# Patient Record
Sex: FEMALE | Race: WHITE | Employment: UNEMPLOYED | URBAN - METROPOLITAN AREA
[De-identification: names, ages, dates, MRNs, and addresses within clinical notes are randomized per-mention and may not be internally consistent; named-entity substitution may affect disease eponyms.]

---

## 2017-02-22 ENCOUNTER — HOSPITAL ENCOUNTER (OUTPATIENT)
Dept: PREADMISSION TESTING | Age: 47
Discharge: HOME | End: 2017-02-22
Payer: COMMERCIAL

## 2017-02-22 VITALS
TEMPERATURE: 97.6 F | BODY MASS INDEX: 41.29 KG/M2 | OXYGEN SATURATION: 98 % | DIASTOLIC BLOOD PRESSURE: 69 MMHG | HEART RATE: 53 BPM | HEIGHT: 65 IN | SYSTOLIC BLOOD PRESSURE: 111 MMHG | RESPIRATION RATE: 16 BRPM | WEIGHT: 247.8 LBS

## 2017-02-22 LAB
ANION GAP SERPL CALCULATED.3IONS-SCNC: 11 MEQ/L (ref 7–13)
BUN BLDV-MCNC: 11 MG/DL (ref 6–20)
CALCIUM SERPL-MCNC: 9.5 MG/DL (ref 8.6–10.2)
CHLORIDE BLD-SCNC: 102 MEQ/L (ref 98–107)
CO2: 27 MEQ/L (ref 22–29)
CREAT SERPL-MCNC: 0.62 MG/DL (ref 0.5–0.9)
GFR AFRICAN AMERICAN: >60
GFR NON-AFRICAN AMERICAN: >60
GLUCOSE BLD-MCNC: 90 MG/DL (ref 74–109)
HCT VFR BLD CALC: 41.8 % (ref 37–47)
HEMOGLOBIN: 14.4 G/DL (ref 12–16)
MCH RBC QN AUTO: 29.7 PG (ref 27–31.3)
MCHC RBC AUTO-ENTMCNC: 34.4 % (ref 33–37)
MCV RBC AUTO: 86.3 FL (ref 82–100)
PDW BLD-RTO: 14 % (ref 11.5–14.5)
PLATELET # BLD: 282 K/UL (ref 130–400)
POTASSIUM SERPL-SCNC: 4.5 MEQ/L (ref 3.5–5.1)
RBC # BLD: 4.84 M/UL (ref 4.2–5.4)
SODIUM BLD-SCNC: 140 MEQ/L (ref 132–144)
TSH SERPL DL<=0.05 MIU/L-ACNC: 0.07 UIU/ML (ref 0.27–4.2)
WBC # BLD: 7.8 K/UL (ref 4.8–10.8)

## 2017-02-22 PROCEDURE — 85027 COMPLETE CBC AUTOMATED: CPT

## 2017-02-22 PROCEDURE — 80048 BASIC METABOLIC PNL TOTAL CA: CPT

## 2017-02-22 PROCEDURE — 84443 ASSAY THYROID STIM HORMONE: CPT

## 2017-02-22 RX ORDER — SODIUM CHLORIDE 0.9 % (FLUSH) 0.9 %
10 SYRINGE (ML) INJECTION PRN
Status: CANCELLED | OUTPATIENT
Start: 2017-02-22

## 2017-02-22 RX ORDER — SODIUM CHLORIDE, SODIUM LACTATE, POTASSIUM CHLORIDE, CALCIUM CHLORIDE 600; 310; 30; 20 MG/100ML; MG/100ML; MG/100ML; MG/100ML
INJECTION, SOLUTION INTRAVENOUS CONTINUOUS
Status: CANCELLED | OUTPATIENT
Start: 2017-02-22

## 2017-02-22 RX ORDER — LIDOCAINE HYDROCHLORIDE 10 MG/ML
1 INJECTION, SOLUTION EPIDURAL; INFILTRATION; INTRACAUDAL; PERINEURAL
Status: CANCELLED | OUTPATIENT
Start: 2017-02-22 | End: 2017-02-22

## 2017-02-22 RX ORDER — FLUTICASONE FUROATE AND VILANTEROL 100; 25 UG/1; UG/1
1 POWDER RESPIRATORY (INHALATION) DAILY
COMMUNITY
End: 2021-12-31

## 2017-02-22 RX ORDER — SODIUM CHLORIDE 0.9 % (FLUSH) 0.9 %
10 SYRINGE (ML) INJECTION EVERY 12 HOURS SCHEDULED
Status: CANCELLED | OUTPATIENT
Start: 2017-02-22

## 2017-02-22 ASSESSMENT — ENCOUNTER SYMPTOMS
DOUBLE VISION: 0
WHEEZING: 0
NAUSEA: 0
DIARRHEA: 0
SPUTUM PRODUCTION: 0
HEARTBURN: 1
SORE THROAT: 0
PHOTOPHOBIA: 0
CONSTIPATION: 0
EYE PAIN: 0
VOMITING: 0
BLURRED VISION: 0
BACK PAIN: 0
COUGH: 0
ABDOMINAL PAIN: 0
SHORTNESS OF BREATH: 0

## 2017-03-01 ENCOUNTER — ANESTHESIA EVENT (OUTPATIENT)
Dept: OPERATING ROOM | Age: 47
End: 2017-03-01
Payer: COMMERCIAL

## 2017-03-02 ENCOUNTER — PREP FOR PROCEDURE (OUTPATIENT)
Dept: ORTHOPEDIC SURGERY | Age: 47
End: 2017-03-02

## 2017-03-02 ENCOUNTER — ANESTHESIA (OUTPATIENT)
Dept: OPERATING ROOM | Age: 47
End: 2017-03-02
Payer: COMMERCIAL

## 2017-03-02 ENCOUNTER — HOSPITAL ENCOUNTER (OUTPATIENT)
Age: 47
Setting detail: OUTPATIENT SURGERY
Discharge: HOME | End: 2017-03-02
Attending: ORTHOPAEDIC SURGERY | Admitting: ORTHOPAEDIC SURGERY
Payer: COMMERCIAL

## 2017-03-02 VITALS
OXYGEN SATURATION: 94 % | RESPIRATION RATE: 16 BRPM | DIASTOLIC BLOOD PRESSURE: 59 MMHG | SYSTOLIC BLOOD PRESSURE: 104 MMHG | TEMPERATURE: 97.7 F | HEART RATE: 66 BPM

## 2017-03-02 VITALS — DIASTOLIC BLOOD PRESSURE: 77 MMHG | OXYGEN SATURATION: 97 % | SYSTOLIC BLOOD PRESSURE: 120 MMHG | TEMPERATURE: 97.7 F

## 2017-03-02 LAB
GLUCOSE BLD-MCNC: 113 MG/DL (ref 60–115)
GLUCOSE BLD-MCNC: 121 MG/DL (ref 60–115)
PERFORMED ON: ABNORMAL
PERFORMED ON: NORMAL

## 2017-03-02 PROCEDURE — 7100000011 HC PHASE II RECOVERY - ADDTL 15 MIN: Performed by: ORTHOPAEDIC SURGERY

## 2017-03-02 PROCEDURE — 2500000003 HC RX 250 WO HCPCS: Performed by: NURSE ANESTHETIST, CERTIFIED REGISTERED

## 2017-03-02 PROCEDURE — 2500000003 HC RX 250 WO HCPCS: Performed by: STUDENT IN AN ORGANIZED HEALTH CARE EDUCATION/TRAINING PROGRAM

## 2017-03-02 PROCEDURE — 6360000002 HC RX W HCPCS: Performed by: ANESTHESIOLOGY

## 2017-03-02 PROCEDURE — 2500000003 HC RX 250 WO HCPCS: Performed by: ORTHOPAEDIC SURGERY

## 2017-03-02 PROCEDURE — 7100000010 HC PHASE II RECOVERY - FIRST 15 MIN: Performed by: ORTHOPAEDIC SURGERY

## 2017-03-02 PROCEDURE — 7100000000 HC PACU RECOVERY - FIRST 15 MIN: Performed by: ORTHOPAEDIC SURGERY

## 2017-03-02 PROCEDURE — 3700000001 HC ADD 15 MINUTES (ANESTHESIA): Performed by: ORTHOPAEDIC SURGERY

## 2017-03-02 PROCEDURE — 2720000010 HC SURG SUPPLY STERILE: Performed by: ORTHOPAEDIC SURGERY

## 2017-03-02 PROCEDURE — 3600000004 HC SURGERY LEVEL 4 BASE: Performed by: ORTHOPAEDIC SURGERY

## 2017-03-02 PROCEDURE — 3600000014 HC SURGERY LEVEL 4 ADDTL 15MIN: Performed by: ORTHOPAEDIC SURGERY

## 2017-03-02 PROCEDURE — 3700000000 HC ANESTHESIA ATTENDED CARE: Performed by: ORTHOPAEDIC SURGERY

## 2017-03-02 PROCEDURE — 6360000002 HC RX W HCPCS: Performed by: ORTHOPAEDIC SURGERY

## 2017-03-02 PROCEDURE — 6370000000 HC RX 637 (ALT 250 FOR IP): Performed by: ORTHOPAEDIC SURGERY

## 2017-03-02 PROCEDURE — 6360000002 HC RX W HCPCS: Performed by: NURSE ANESTHETIST, CERTIFIED REGISTERED

## 2017-03-02 PROCEDURE — 2580000003 HC RX 258: Performed by: ORTHOPAEDIC SURGERY

## 2017-03-02 PROCEDURE — 7100000001 HC PACU RECOVERY - ADDTL 15 MIN: Performed by: ORTHOPAEDIC SURGERY

## 2017-03-02 PROCEDURE — 2580000003 HC RX 258: Performed by: STUDENT IN AN ORGANIZED HEALTH CARE EDUCATION/TRAINING PROGRAM

## 2017-03-02 RX ORDER — SODIUM CHLORIDE 0.9 % (FLUSH) 0.9 %
10 SYRINGE (ML) INJECTION EVERY 12 HOURS SCHEDULED
Status: DISCONTINUED | OUTPATIENT
Start: 2017-03-02 | End: 2017-03-02 | Stop reason: HOSPADM

## 2017-03-02 RX ORDER — MORPHINE SULFATE 2 MG/ML
2 INJECTION, SOLUTION INTRAMUSCULAR; INTRAVENOUS
Status: DISCONTINUED | OUTPATIENT
Start: 2017-03-02 | End: 2017-03-02 | Stop reason: HOSPADM

## 2017-03-02 RX ORDER — HYDROCODONE BITARTRATE AND ACETAMINOPHEN 5; 325 MG/1; MG/1
1 TABLET ORAL PRN
Status: DISCONTINUED | OUTPATIENT
Start: 2017-03-02 | End: 2017-03-02 | Stop reason: HOSPADM

## 2017-03-02 RX ORDER — OXYCODONE HYDROCHLORIDE AND ACETAMINOPHEN 5; 325 MG/1; MG/1
2 TABLET ORAL EVERY 4 HOURS PRN
Status: DISCONTINUED | OUTPATIENT
Start: 2017-03-02 | End: 2017-03-02 | Stop reason: HOSPADM

## 2017-03-02 RX ORDER — SODIUM CHLORIDE 0.9 % (FLUSH) 0.9 %
10 SYRINGE (ML) INJECTION PRN
Status: DISCONTINUED | OUTPATIENT
Start: 2017-03-02 | End: 2017-03-02 | Stop reason: HOSPADM

## 2017-03-02 RX ORDER — METOCLOPRAMIDE HYDROCHLORIDE 5 MG/ML
10 INJECTION INTRAMUSCULAR; INTRAVENOUS
Status: COMPLETED | OUTPATIENT
Start: 2017-03-02 | End: 2017-03-02

## 2017-03-02 RX ORDER — ACETAMINOPHEN 325 MG/1
650 TABLET ORAL EVERY 4 HOURS PRN
Status: DISCONTINUED | OUTPATIENT
Start: 2017-03-02 | End: 2017-03-02 | Stop reason: HOSPADM

## 2017-03-02 RX ORDER — OXYCODONE HYDROCHLORIDE AND ACETAMINOPHEN 5; 325 MG/1; MG/1
1 TABLET ORAL EVERY 4 HOURS PRN
Status: CANCELLED | OUTPATIENT
Start: 2017-03-02

## 2017-03-02 RX ORDER — MORPHINE SULFATE 2 MG/ML
4 INJECTION, SOLUTION INTRAMUSCULAR; INTRAVENOUS
Status: CANCELLED | OUTPATIENT
Start: 2017-03-02

## 2017-03-02 RX ORDER — GINSENG 100 MG
CAPSULE ORAL PRN
Status: DISCONTINUED | OUTPATIENT
Start: 2017-03-02 | End: 2017-03-02 | Stop reason: HOSPADM

## 2017-03-02 RX ORDER — ACETAMINOPHEN 325 MG/1
650 TABLET ORAL EVERY 4 HOURS PRN
Status: CANCELLED | OUTPATIENT
Start: 2017-03-02

## 2017-03-02 RX ORDER — MORPHINE SULFATE 4 MG/ML
4 INJECTION, SOLUTION INTRAMUSCULAR; INTRAVENOUS
Status: DISCONTINUED | OUTPATIENT
Start: 2017-03-02 | End: 2017-03-02 | Stop reason: HOSPADM

## 2017-03-02 RX ORDER — SODIUM CHLORIDE, SODIUM LACTATE, POTASSIUM CHLORIDE, CALCIUM CHLORIDE 600; 310; 30; 20 MG/100ML; MG/100ML; MG/100ML; MG/100ML
INJECTION, SOLUTION INTRAVENOUS CONTINUOUS
Status: DISCONTINUED | OUTPATIENT
Start: 2017-03-02 | End: 2017-03-02 | Stop reason: HOSPADM

## 2017-03-02 RX ORDER — HYDROCODONE BITARTRATE AND ACETAMINOPHEN 5; 325 MG/1; MG/1
2 TABLET ORAL PRN
Status: DISCONTINUED | OUTPATIENT
Start: 2017-03-02 | End: 2017-03-02 | Stop reason: HOSPADM

## 2017-03-02 RX ORDER — MIDAZOLAM HYDROCHLORIDE 1 MG/ML
INJECTION INTRAMUSCULAR; INTRAVENOUS PRN
Status: DISCONTINUED | OUTPATIENT
Start: 2017-03-02 | End: 2017-03-02 | Stop reason: SDUPTHER

## 2017-03-02 RX ORDER — SODIUM CHLORIDE 0.9 % (FLUSH) 0.9 %
10 SYRINGE (ML) INJECTION PRN
Status: CANCELLED | OUTPATIENT
Start: 2017-03-02

## 2017-03-02 RX ORDER — LIDOCAINE HYDROCHLORIDE 10 MG/ML
1 INJECTION, SOLUTION EPIDURAL; INFILTRATION; INTRACAUDAL; PERINEURAL
Status: COMPLETED | OUTPATIENT
Start: 2017-03-02 | End: 2017-03-02

## 2017-03-02 RX ORDER — MAGNESIUM HYDROXIDE 1200 MG/15ML
LIQUID ORAL CONTINUOUS PRN
Status: DISCONTINUED | OUTPATIENT
Start: 2017-03-02 | End: 2017-03-02 | Stop reason: HOSPADM

## 2017-03-02 RX ORDER — PROPOFOL 10 MG/ML
INJECTION, EMULSION INTRAVENOUS PRN
Status: DISCONTINUED | OUTPATIENT
Start: 2017-03-02 | End: 2017-03-02 | Stop reason: SDUPTHER

## 2017-03-02 RX ORDER — MEPERIDINE HYDROCHLORIDE 25 MG/ML
12.5 INJECTION INTRAMUSCULAR; INTRAVENOUS; SUBCUTANEOUS EVERY 5 MIN PRN
Status: DISCONTINUED | OUTPATIENT
Start: 2017-03-02 | End: 2017-03-02 | Stop reason: HOSPADM

## 2017-03-02 RX ORDER — ONDANSETRON 2 MG/ML
4 INJECTION INTRAMUSCULAR; INTRAVENOUS
Status: COMPLETED | OUTPATIENT
Start: 2017-03-02 | End: 2017-03-02

## 2017-03-02 RX ORDER — OXYCODONE HYDROCHLORIDE AND ACETAMINOPHEN 5; 325 MG/1; MG/1
2 TABLET ORAL EVERY 4 HOURS PRN
Status: CANCELLED | OUTPATIENT
Start: 2017-03-02

## 2017-03-02 RX ORDER — ONDANSETRON 2 MG/ML
INJECTION INTRAMUSCULAR; INTRAVENOUS PRN
Status: DISCONTINUED | OUTPATIENT
Start: 2017-03-02 | End: 2017-03-02 | Stop reason: SDUPTHER

## 2017-03-02 RX ORDER — ONDANSETRON 2 MG/ML
4 INJECTION INTRAMUSCULAR; INTRAVENOUS EVERY 6 HOURS PRN
Status: DISCONTINUED | OUTPATIENT
Start: 2017-03-02 | End: 2017-03-02 | Stop reason: HOSPADM

## 2017-03-02 RX ORDER — KETOROLAC TROMETHAMINE 30 MG/ML
INJECTION, SOLUTION INTRAMUSCULAR; INTRAVENOUS PRN
Status: DISCONTINUED | OUTPATIENT
Start: 2017-03-02 | End: 2017-03-02 | Stop reason: SDUPTHER

## 2017-03-02 RX ORDER — MORPHINE SULFATE 2 MG/ML
2 INJECTION, SOLUTION INTRAMUSCULAR; INTRAVENOUS
Status: CANCELLED | OUTPATIENT
Start: 2017-03-02

## 2017-03-02 RX ORDER — LIDOCAINE HYDROCHLORIDE 10 MG/ML
INJECTION, SOLUTION EPIDURAL; INFILTRATION; INTRACAUDAL; PERINEURAL PRN
Status: DISCONTINUED | OUTPATIENT
Start: 2017-03-02 | End: 2017-03-02 | Stop reason: SDUPTHER

## 2017-03-02 RX ORDER — SODIUM CHLORIDE 0.9 % (FLUSH) 0.9 %
10 SYRINGE (ML) INJECTION EVERY 12 HOURS SCHEDULED
Status: CANCELLED | OUTPATIENT
Start: 2017-03-02

## 2017-03-02 RX ORDER — FENTANYL CITRATE 50 UG/ML
50 INJECTION, SOLUTION INTRAMUSCULAR; INTRAVENOUS EVERY 10 MIN PRN
Status: DISCONTINUED | OUTPATIENT
Start: 2017-03-02 | End: 2017-03-02 | Stop reason: HOSPADM

## 2017-03-02 RX ORDER — ONDANSETRON 2 MG/ML
4 INJECTION INTRAMUSCULAR; INTRAVENOUS EVERY 6 HOURS PRN
Status: CANCELLED | OUTPATIENT
Start: 2017-03-02

## 2017-03-02 RX ORDER — DIPHENHYDRAMINE HYDROCHLORIDE 50 MG/ML
12.5 INJECTION INTRAMUSCULAR; INTRAVENOUS
Status: DISCONTINUED | OUTPATIENT
Start: 2017-03-02 | End: 2017-03-02 | Stop reason: HOSPADM

## 2017-03-02 RX ORDER — OXYCODONE HYDROCHLORIDE AND ACETAMINOPHEN 5; 325 MG/1; MG/1
1 TABLET ORAL EVERY 4 HOURS PRN
Status: DISCONTINUED | OUTPATIENT
Start: 2017-03-02 | End: 2017-03-02 | Stop reason: HOSPADM

## 2017-03-02 RX ORDER — BUPIVACAINE HYDROCHLORIDE 5 MG/ML
INJECTION, SOLUTION EPIDURAL; INTRACAUDAL PRN
Status: DISCONTINUED | OUTPATIENT
Start: 2017-03-02 | End: 2017-03-02 | Stop reason: HOSPADM

## 2017-03-02 RX ADMIN — MIDAZOLAM HYDROCHLORIDE 2 MG: 1 INJECTION, SOLUTION INTRAMUSCULAR; INTRAVENOUS at 07:31

## 2017-03-02 RX ADMIN — FENTANYL CITRATE 50 MCG: 50 INJECTION, SOLUTION INTRAMUSCULAR; INTRAVENOUS at 07:31

## 2017-03-02 RX ADMIN — KETOROLAC TROMETHAMINE 30 MG: 30 INJECTION, SOLUTION INTRAMUSCULAR; INTRAVENOUS at 07:46

## 2017-03-02 RX ADMIN — PROPOFOL 150 MG: 10 INJECTION, EMULSION INTRAVENOUS at 07:31

## 2017-03-02 RX ADMIN — FENTANYL CITRATE 25 MCG: 50 INJECTION, SOLUTION INTRAMUSCULAR; INTRAVENOUS at 07:45

## 2017-03-02 RX ADMIN — HYDROMORPHONE HYDROCHLORIDE 0.5 MG: 1 INJECTION, SOLUTION INTRAMUSCULAR; INTRAVENOUS; SUBCUTANEOUS at 08:25

## 2017-03-02 RX ADMIN — ONDANSETRON 4 MG: 2 INJECTION, SOLUTION INTRAMUSCULAR; INTRAVENOUS at 07:35

## 2017-03-02 RX ADMIN — METOCLOPRAMIDE 10 MG: 5 INJECTION, SOLUTION INTRAMUSCULAR; INTRAVENOUS at 09:00

## 2017-03-02 RX ADMIN — HYDROMORPHONE HYDROCHLORIDE 0.5 MG: 1 INJECTION, SOLUTION INTRAMUSCULAR; INTRAVENOUS; SUBCUTANEOUS at 08:35

## 2017-03-02 RX ADMIN — FENTANYL CITRATE 25 MCG: 50 INJECTION, SOLUTION INTRAMUSCULAR; INTRAVENOUS at 07:40

## 2017-03-02 RX ADMIN — LIDOCAINE HYDROCHLORIDE 1 ML: 10 INJECTION, SOLUTION EPIDURAL; INFILTRATION; INTRACAUDAL; PERINEURAL at 06:34

## 2017-03-02 RX ADMIN — SODIUM CHLORIDE, POTASSIUM CHLORIDE, SODIUM LACTATE AND CALCIUM CHLORIDE: 600; 310; 30; 20 INJECTION, SOLUTION INTRAVENOUS at 06:34

## 2017-03-02 RX ADMIN — ONDANSETRON 4 MG: 2 INJECTION, SOLUTION INTRAMUSCULAR; INTRAVENOUS at 08:38

## 2017-03-02 RX ADMIN — LIDOCAINE HYDROCHLORIDE 50 MG: 10 INJECTION, SOLUTION EPIDURAL; INFILTRATION; INTRACAUDAL; PERINEURAL at 07:31

## 2017-03-02 RX ADMIN — Medication 2 G: at 07:29

## 2017-03-02 ASSESSMENT — PAIN SCALES - GENERAL
PAINLEVEL_OUTOF10: 3
PAINLEVEL_OUTOF10: 3
PAINLEVEL_OUTOF10: 4
PAINLEVEL_OUTOF10: 3

## 2017-03-02 ASSESSMENT — PAIN - FUNCTIONAL ASSESSMENT: PAIN_FUNCTIONAL_ASSESSMENT: 0-10

## 2017-04-11 ENCOUNTER — HOSPITAL ENCOUNTER (OUTPATIENT)
Dept: MRI IMAGING | Age: 47
Discharge: HOME | End: 2017-04-11
Payer: COMMERCIAL

## 2017-04-11 VITALS — WEIGHT: 252 LBS | HEIGHT: 64 IN | BODY MASS INDEX: 43.02 KG/M2

## 2017-04-11 DIAGNOSIS — S83.241A ACUTE MEDIAL MENISCAL TEAR, RIGHT, INITIAL ENCOUNTER: ICD-10-CM

## 2017-04-11 PROCEDURE — 73721 MRI JNT OF LWR EXTRE W/O DYE: CPT

## 2017-05-11 ENCOUNTER — HOSPITAL ENCOUNTER (OUTPATIENT)
Dept: PHYSICAL THERAPY | Age: 47
Setting detail: THERAPIES SERIES
Discharge: HOME | End: 2017-05-11
Payer: COMMERCIAL

## 2017-05-11 PROCEDURE — 97162 PT EVAL MOD COMPLEX 30 MIN: CPT

## 2017-05-11 ASSESSMENT — PAIN SCALES - GENERAL: PAINLEVEL_OUTOF10: 1

## 2017-05-15 ENCOUNTER — HOSPITAL ENCOUNTER (OUTPATIENT)
Dept: PHYSICAL THERAPY | Age: 47
Setting detail: THERAPIES SERIES
Discharge: HOME | End: 2017-05-15
Payer: COMMERCIAL

## 2017-05-15 PROCEDURE — 97110 THERAPEUTIC EXERCISES: CPT

## 2017-05-15 PROCEDURE — G0283 ELEC STIM OTHER THAN WOUND: HCPCS

## 2017-05-15 ASSESSMENT — PAIN SCALES - GENERAL: PAINLEVEL_OUTOF10: 3

## 2017-05-18 ENCOUNTER — HOSPITAL ENCOUNTER (OUTPATIENT)
Dept: PHYSICAL THERAPY | Age: 47
Setting detail: THERAPIES SERIES
Discharge: HOME | End: 2017-05-18
Payer: COMMERCIAL

## 2017-05-18 PROCEDURE — G0283 ELEC STIM OTHER THAN WOUND: HCPCS

## 2017-05-18 PROCEDURE — 97110 THERAPEUTIC EXERCISES: CPT

## 2017-05-18 ASSESSMENT — PAIN SCALES - GENERAL: PAINLEVEL_OUTOF10: 7

## 2017-05-19 ENCOUNTER — HOSPITAL ENCOUNTER (OUTPATIENT)
Dept: PHYSICAL THERAPY | Age: 47
Setting detail: THERAPIES SERIES
Discharge: HOME | End: 2017-05-19
Payer: COMMERCIAL

## 2017-05-19 PROCEDURE — G0283 ELEC STIM OTHER THAN WOUND: HCPCS

## 2017-05-19 PROCEDURE — 97110 THERAPEUTIC EXERCISES: CPT

## 2017-05-23 ENCOUNTER — HOSPITAL ENCOUNTER (OUTPATIENT)
Dept: PHYSICAL THERAPY | Age: 47
Setting detail: THERAPIES SERIES
Discharge: HOME | End: 2017-05-23
Payer: COMMERCIAL

## 2017-05-23 PROCEDURE — 97110 THERAPEUTIC EXERCISES: CPT

## 2017-05-23 ASSESSMENT — PAIN SCALES - GENERAL: PAINLEVEL_OUTOF10: 3

## 2017-05-24 ENCOUNTER — HOSPITAL ENCOUNTER (OUTPATIENT)
Dept: PHYSICAL THERAPY | Age: 47
Setting detail: THERAPIES SERIES
Discharge: HOME | End: 2017-05-24
Payer: COMMERCIAL

## 2017-05-24 PROCEDURE — 97110 THERAPEUTIC EXERCISES: CPT

## 2017-05-25 ENCOUNTER — HOSPITAL ENCOUNTER (OUTPATIENT)
Dept: PHYSICAL THERAPY | Age: 47
Setting detail: THERAPIES SERIES
Discharge: HOME | End: 2017-05-25
Payer: COMMERCIAL

## 2017-05-25 PROCEDURE — 97110 THERAPEUTIC EXERCISES: CPT

## 2017-05-31 ENCOUNTER — HOSPITAL ENCOUNTER (OUTPATIENT)
Dept: PHYSICAL THERAPY | Age: 47
Setting detail: THERAPIES SERIES
Discharge: HOME | End: 2017-05-31
Payer: COMMERCIAL

## 2017-05-31 PROCEDURE — 97110 THERAPEUTIC EXERCISES: CPT

## 2017-05-31 ASSESSMENT — PAIN SCALES - GENERAL: PAINLEVEL_OUTOF10: 7

## 2017-06-02 ENCOUNTER — HOSPITAL ENCOUNTER (OUTPATIENT)
Dept: PHYSICAL THERAPY | Age: 47
Setting detail: THERAPIES SERIES
Discharge: HOME | End: 2017-06-02
Payer: COMMERCIAL

## 2017-06-02 ENCOUNTER — HOSPITAL ENCOUNTER (OUTPATIENT)
Dept: PREADMISSION TESTING | Age: 47
Discharge: HOME | End: 2017-06-02
Payer: COMMERCIAL

## 2017-06-02 VITALS
BODY MASS INDEX: 42.68 KG/M2 | OXYGEN SATURATION: 97 % | WEIGHT: 250 LBS | HEIGHT: 64 IN | HEART RATE: 65 BPM | TEMPERATURE: 97.1 F | SYSTOLIC BLOOD PRESSURE: 101 MMHG | RESPIRATION RATE: 14 BRPM | DIASTOLIC BLOOD PRESSURE: 69 MMHG

## 2017-06-02 LAB
ANION GAP SERPL CALCULATED.3IONS-SCNC: 13 MEQ/L (ref 7–13)
BUN BLDV-MCNC: 19 MG/DL (ref 6–20)
CALCIUM SERPL-MCNC: 9.2 MG/DL (ref 8.6–10.2)
CHLORIDE BLD-SCNC: 103 MEQ/L (ref 98–107)
CO2: 25 MEQ/L (ref 22–29)
CREAT SERPL-MCNC: 0.82 MG/DL (ref 0.5–0.9)
GFR AFRICAN AMERICAN: >60
GFR NON-AFRICAN AMERICAN: >60
GLUCOSE BLD-MCNC: 104 MG/DL (ref 74–109)
HCT VFR BLD CALC: 43.5 % (ref 37–47)
HEMOGLOBIN: 14.2 G/DL (ref 12–16)
MCH RBC QN AUTO: 29.1 PG (ref 27–31.3)
MCHC RBC AUTO-ENTMCNC: 32.6 % (ref 33–37)
MCV RBC AUTO: 89.1 FL (ref 82–100)
PDW BLD-RTO: 14.3 % (ref 11.5–14.5)
PLATELET # BLD: 234 K/UL (ref 130–400)
POTASSIUM SERPL-SCNC: 4.4 MEQ/L (ref 3.5–5.1)
RBC # BLD: 4.89 M/UL (ref 4.2–5.4)
SODIUM BLD-SCNC: 141 MEQ/L (ref 132–144)
WBC # BLD: 6.4 K/UL (ref 4.8–10.8)

## 2017-06-02 PROCEDURE — 97110 THERAPEUTIC EXERCISES: CPT

## 2017-06-02 PROCEDURE — 80048 BASIC METABOLIC PNL TOTAL CA: CPT

## 2017-06-02 PROCEDURE — 85027 COMPLETE CBC AUTOMATED: CPT

## 2017-06-02 RX ORDER — SODIUM CHLORIDE, SODIUM LACTATE, POTASSIUM CHLORIDE, CALCIUM CHLORIDE 600; 310; 30; 20 MG/100ML; MG/100ML; MG/100ML; MG/100ML
INJECTION, SOLUTION INTRAVENOUS CONTINUOUS
Status: CANCELLED | OUTPATIENT
Start: 2017-06-02

## 2017-06-02 RX ORDER — SODIUM CHLORIDE 0.9 % (FLUSH) 0.9 %
10 SYRINGE (ML) INJECTION PRN
Status: CANCELLED | OUTPATIENT
Start: 2017-06-02

## 2017-06-02 RX ORDER — SODIUM CHLORIDE 0.9 % (FLUSH) 0.9 %
10 SYRINGE (ML) INJECTION EVERY 12 HOURS SCHEDULED
Status: CANCELLED | OUTPATIENT
Start: 2017-06-02

## 2017-06-02 RX ORDER — LIDOCAINE HYDROCHLORIDE 10 MG/ML
1 INJECTION, SOLUTION EPIDURAL; INFILTRATION; INTRACAUDAL; PERINEURAL
Status: CANCELLED | OUTPATIENT
Start: 2017-06-02 | End: 2017-06-02

## 2017-06-02 ASSESSMENT — ENCOUNTER SYMPTOMS
GASTROINTESTINAL NEGATIVE: 1
HEARTBURN: 0
DIARRHEA: 0
VOMITING: 0
EYES NEGATIVE: 1
RESPIRATORY NEGATIVE: 1
SHORTNESS OF BREATH: 0
WHEEZING: 0
BACK PAIN: 0
CONSTIPATION: 0
ABDOMINAL PAIN: 0
COUGH: 0
DOUBLE VISION: 0
NAUSEA: 0
BLURRED VISION: 0
SORE THROAT: 0
EYE PAIN: 0

## 2017-06-02 ASSESSMENT — PAIN SCALES - GENERAL: PAINLEVEL_OUTOF10: 6

## 2017-06-05 ENCOUNTER — HOSPITAL ENCOUNTER (OUTPATIENT)
Dept: PHYSICAL THERAPY | Age: 47
Setting detail: THERAPIES SERIES
Discharge: HOME | End: 2017-06-05
Payer: COMMERCIAL

## 2017-06-05 PROCEDURE — 97110 THERAPEUTIC EXERCISES: CPT

## 2017-06-05 ASSESSMENT — PAIN SCALES - GENERAL: PAINLEVEL_OUTOF10: 4

## 2017-06-06 ENCOUNTER — HOSPITAL ENCOUNTER (OUTPATIENT)
Dept: PHYSICAL THERAPY | Age: 47
Setting detail: THERAPIES SERIES
Discharge: HOME | End: 2017-06-06
Payer: COMMERCIAL

## 2017-06-06 PROCEDURE — 97110 THERAPEUTIC EXERCISES: CPT

## 2017-06-06 ASSESSMENT — PAIN SCALES - GENERAL: PAINLEVEL_OUTOF10: 4

## 2017-06-08 ENCOUNTER — ANESTHESIA EVENT (OUTPATIENT)
Dept: OPERATING ROOM | Age: 47
End: 2017-06-08
Payer: COMMERCIAL

## 2017-06-08 ENCOUNTER — PREP FOR PROCEDURE (OUTPATIENT)
Dept: ORTHOPEDIC SURGERY | Age: 47
End: 2017-06-08

## 2017-06-08 ENCOUNTER — HOSPITAL ENCOUNTER (OUTPATIENT)
Age: 47
Setting detail: OUTPATIENT SURGERY
Discharge: HOME | End: 2017-06-08
Attending: ORTHOPAEDIC SURGERY | Admitting: ORTHOPAEDIC SURGERY
Payer: COMMERCIAL

## 2017-06-08 ENCOUNTER — ANESTHESIA (OUTPATIENT)
Dept: OPERATING ROOM | Age: 47
End: 2017-06-08
Payer: COMMERCIAL

## 2017-06-08 VITALS — DIASTOLIC BLOOD PRESSURE: 55 MMHG | SYSTOLIC BLOOD PRESSURE: 93 MMHG | TEMPERATURE: 93.7 F | OXYGEN SATURATION: 99 %

## 2017-06-08 VITALS
BODY MASS INDEX: 42.68 KG/M2 | TEMPERATURE: 97.4 F | SYSTOLIC BLOOD PRESSURE: 109 MMHG | HEIGHT: 64 IN | OXYGEN SATURATION: 97 % | HEART RATE: 68 BPM | RESPIRATION RATE: 18 BRPM | WEIGHT: 250 LBS | DIASTOLIC BLOOD PRESSURE: 59 MMHG

## 2017-06-08 LAB
GLUCOSE BLD-MCNC: 120 MG/DL (ref 60–115)
PERFORMED ON: ABNORMAL

## 2017-06-08 PROCEDURE — 7100000011 HC PHASE II RECOVERY - ADDTL 15 MIN: Performed by: ORTHOPAEDIC SURGERY

## 2017-06-08 PROCEDURE — 6360000002 HC RX W HCPCS: Performed by: ORTHOPAEDIC SURGERY

## 2017-06-08 PROCEDURE — 2500000003 HC RX 250 WO HCPCS: Performed by: ORTHOPAEDIC SURGERY

## 2017-06-08 PROCEDURE — 3700000001 HC ADD 15 MINUTES (ANESTHESIA): Performed by: ORTHOPAEDIC SURGERY

## 2017-06-08 PROCEDURE — 3600000004 HC SURGERY LEVEL 4 BASE: Performed by: ORTHOPAEDIC SURGERY

## 2017-06-08 PROCEDURE — 2580000003 HC RX 258: Performed by: STUDENT IN AN ORGANIZED HEALTH CARE EDUCATION/TRAINING PROGRAM

## 2017-06-08 PROCEDURE — 7100000010 HC PHASE II RECOVERY - FIRST 15 MIN: Performed by: ORTHOPAEDIC SURGERY

## 2017-06-08 PROCEDURE — 7100000000 HC PACU RECOVERY - FIRST 15 MIN: Performed by: ORTHOPAEDIC SURGERY

## 2017-06-08 PROCEDURE — 6370000000 HC RX 637 (ALT 250 FOR IP): Performed by: ORTHOPAEDIC SURGERY

## 2017-06-08 PROCEDURE — 6360000002 HC RX W HCPCS: Performed by: NURSE ANESTHETIST, CERTIFIED REGISTERED

## 2017-06-08 PROCEDURE — 2500000003 HC RX 250 WO HCPCS: Performed by: NURSE ANESTHETIST, CERTIFIED REGISTERED

## 2017-06-08 PROCEDURE — 2720000010 HC SURG SUPPLY STERILE: Performed by: ORTHOPAEDIC SURGERY

## 2017-06-08 PROCEDURE — 2580000003 HC RX 258: Performed by: ORTHOPAEDIC SURGERY

## 2017-06-08 PROCEDURE — 7100000001 HC PACU RECOVERY - ADDTL 15 MIN: Performed by: ORTHOPAEDIC SURGERY

## 2017-06-08 PROCEDURE — 3700000000 HC ANESTHESIA ATTENDED CARE: Performed by: ORTHOPAEDIC SURGERY

## 2017-06-08 PROCEDURE — 2500000003 HC RX 250 WO HCPCS: Performed by: STUDENT IN AN ORGANIZED HEALTH CARE EDUCATION/TRAINING PROGRAM

## 2017-06-08 PROCEDURE — 3600000014 HC SURGERY LEVEL 4 ADDTL 15MIN: Performed by: ORTHOPAEDIC SURGERY

## 2017-06-08 RX ORDER — QUETIAPINE FUMARATE 300 MG/1
300 TABLET, FILM COATED ORAL DAILY
COMMUNITY
End: 2021-02-24 | Stop reason: ALTCHOICE

## 2017-06-08 RX ORDER — SODIUM CHLORIDE 0.9 % (FLUSH) 0.9 %
10 SYRINGE (ML) INJECTION PRN
Status: DISCONTINUED | OUTPATIENT
Start: 2017-06-08 | End: 2017-06-08 | Stop reason: HOSPADM

## 2017-06-08 RX ORDER — PROPOFOL 10 MG/ML
INJECTION, EMULSION INTRAVENOUS PRN
Status: DISCONTINUED | OUTPATIENT
Start: 2017-06-08 | End: 2017-06-08 | Stop reason: SDUPTHER

## 2017-06-08 RX ORDER — FENTANYL CITRATE 50 UG/ML
INJECTION, SOLUTION INTRAMUSCULAR; INTRAVENOUS PRN
Status: DISCONTINUED | OUTPATIENT
Start: 2017-06-08 | End: 2017-06-08 | Stop reason: SDUPTHER

## 2017-06-08 RX ORDER — ONDANSETRON 2 MG/ML
4 INJECTION INTRAMUSCULAR; INTRAVENOUS EVERY 6 HOURS PRN
Status: DISCONTINUED | OUTPATIENT
Start: 2017-06-08 | End: 2017-06-08 | Stop reason: HOSPADM

## 2017-06-08 RX ORDER — DEXAMETHASONE SODIUM PHOSPHATE 10 MG/ML
INJECTION INTRAMUSCULAR; INTRAVENOUS PRN
Status: DISCONTINUED | OUTPATIENT
Start: 2017-06-08 | End: 2017-06-08 | Stop reason: SDUPTHER

## 2017-06-08 RX ORDER — BUPIVACAINE HYDROCHLORIDE 5 MG/ML
INJECTION, SOLUTION EPIDURAL; INTRACAUDAL PRN
Status: DISCONTINUED | OUTPATIENT
Start: 2017-06-08 | End: 2017-06-08 | Stop reason: HOSPADM

## 2017-06-08 RX ORDER — SODIUM CHLORIDE 0.9 % (FLUSH) 0.9 %
10 SYRINGE (ML) INJECTION EVERY 12 HOURS SCHEDULED
Status: CANCELLED | OUTPATIENT
Start: 2017-06-08

## 2017-06-08 RX ORDER — ONDANSETRON 2 MG/ML
INJECTION INTRAMUSCULAR; INTRAVENOUS PRN
Status: DISCONTINUED | OUTPATIENT
Start: 2017-06-08 | End: 2017-06-08 | Stop reason: SDUPTHER

## 2017-06-08 RX ORDER — ONDANSETRON 2 MG/ML
4 INJECTION INTRAMUSCULAR; INTRAVENOUS
Status: DISCONTINUED | OUTPATIENT
Start: 2017-06-08 | End: 2017-06-08 | Stop reason: HOSPADM

## 2017-06-08 RX ORDER — MORPHINE SULFATE 4 MG/ML
4 INJECTION, SOLUTION INTRAMUSCULAR; INTRAVENOUS
Status: DISCONTINUED | OUTPATIENT
Start: 2017-06-08 | End: 2017-06-08 | Stop reason: HOSPADM

## 2017-06-08 RX ORDER — MEPERIDINE HYDROCHLORIDE 25 MG/ML
12.5 INJECTION INTRAMUSCULAR; INTRAVENOUS; SUBCUTANEOUS EVERY 5 MIN PRN
Status: DISCONTINUED | OUTPATIENT
Start: 2017-06-08 | End: 2017-06-08 | Stop reason: HOSPADM

## 2017-06-08 RX ORDER — GINSENG 100 MG
CAPSULE ORAL PRN
Status: DISCONTINUED | OUTPATIENT
Start: 2017-06-08 | End: 2017-06-08 | Stop reason: HOSPADM

## 2017-06-08 RX ORDER — HYDROCODONE BITARTRATE AND ACETAMINOPHEN 5; 325 MG/1; MG/1
1 TABLET ORAL PRN
Status: DISCONTINUED | OUTPATIENT
Start: 2017-06-08 | End: 2017-06-08 | Stop reason: HOSPADM

## 2017-06-08 RX ORDER — FENTANYL CITRATE 50 UG/ML
50 INJECTION, SOLUTION INTRAMUSCULAR; INTRAVENOUS EVERY 10 MIN PRN
Status: DISCONTINUED | OUTPATIENT
Start: 2017-06-08 | End: 2017-06-08 | Stop reason: HOSPADM

## 2017-06-08 RX ORDER — MORPHINE SULFATE 2 MG/ML
2 INJECTION, SOLUTION INTRAMUSCULAR; INTRAVENOUS
Status: CANCELLED | OUTPATIENT
Start: 2017-06-08

## 2017-06-08 RX ORDER — OXYCODONE HYDROCHLORIDE AND ACETAMINOPHEN 5; 325 MG/1; MG/1
1 TABLET ORAL EVERY 4 HOURS PRN
Status: DISCONTINUED | OUTPATIENT
Start: 2017-06-08 | End: 2017-06-08 | Stop reason: HOSPADM

## 2017-06-08 RX ORDER — SODIUM CHLORIDE 0.9 % (FLUSH) 0.9 %
10 SYRINGE (ML) INJECTION EVERY 12 HOURS SCHEDULED
Status: DISCONTINUED | OUTPATIENT
Start: 2017-06-08 | End: 2017-06-08 | Stop reason: HOSPADM

## 2017-06-08 RX ORDER — MIDAZOLAM HYDROCHLORIDE 1 MG/ML
INJECTION INTRAMUSCULAR; INTRAVENOUS PRN
Status: DISCONTINUED | OUTPATIENT
Start: 2017-06-08 | End: 2017-06-08 | Stop reason: SDUPTHER

## 2017-06-08 RX ORDER — DIPHENHYDRAMINE HYDROCHLORIDE 50 MG/ML
12.5 INJECTION INTRAMUSCULAR; INTRAVENOUS
Status: DISCONTINUED | OUTPATIENT
Start: 2017-06-08 | End: 2017-06-08 | Stop reason: HOSPADM

## 2017-06-08 RX ORDER — MAGNESIUM HYDROXIDE 1200 MG/15ML
LIQUID ORAL CONTINUOUS PRN
Status: DISCONTINUED | OUTPATIENT
Start: 2017-06-08 | End: 2017-06-08 | Stop reason: HOSPADM

## 2017-06-08 RX ORDER — HYDROCODONE BITARTRATE AND ACETAMINOPHEN 5; 325 MG/1; MG/1
2 TABLET ORAL PRN
Status: DISCONTINUED | OUTPATIENT
Start: 2017-06-08 | End: 2017-06-08 | Stop reason: HOSPADM

## 2017-06-08 RX ORDER — MORPHINE SULFATE 2 MG/ML
2 INJECTION, SOLUTION INTRAMUSCULAR; INTRAVENOUS
Status: DISCONTINUED | OUTPATIENT
Start: 2017-06-08 | End: 2017-06-08 | Stop reason: HOSPADM

## 2017-06-08 RX ORDER — MORPHINE SULFATE 2 MG/ML
4 INJECTION, SOLUTION INTRAMUSCULAR; INTRAVENOUS
Status: CANCELLED | OUTPATIENT
Start: 2017-06-08

## 2017-06-08 RX ORDER — OXYCODONE HYDROCHLORIDE AND ACETAMINOPHEN 5; 325 MG/1; MG/1
1 TABLET ORAL EVERY 4 HOURS PRN
Status: CANCELLED | OUTPATIENT
Start: 2017-06-08

## 2017-06-08 RX ORDER — SODIUM CHLORIDE, SODIUM LACTATE, POTASSIUM CHLORIDE, CALCIUM CHLORIDE 600; 310; 30; 20 MG/100ML; MG/100ML; MG/100ML; MG/100ML
INJECTION, SOLUTION INTRAVENOUS CONTINUOUS
Status: DISCONTINUED | OUTPATIENT
Start: 2017-06-08 | End: 2017-06-08 | Stop reason: HOSPADM

## 2017-06-08 RX ORDER — LIDOCAINE HYDROCHLORIDE 10 MG/ML
1 INJECTION, SOLUTION EPIDURAL; INFILTRATION; INTRACAUDAL; PERINEURAL
Status: COMPLETED | OUTPATIENT
Start: 2017-06-08 | End: 2017-06-08

## 2017-06-08 RX ORDER — ACETAMINOPHEN 325 MG/1
650 TABLET ORAL EVERY 4 HOURS PRN
Status: DISCONTINUED | OUTPATIENT
Start: 2017-06-08 | End: 2017-06-08 | Stop reason: HOSPADM

## 2017-06-08 RX ORDER — OXYCODONE HYDROCHLORIDE AND ACETAMINOPHEN 5; 325 MG/1; MG/1
2 TABLET ORAL EVERY 4 HOURS PRN
Status: DISCONTINUED | OUTPATIENT
Start: 2017-06-08 | End: 2017-06-08 | Stop reason: HOSPADM

## 2017-06-08 RX ORDER — LIDOCAINE HYDROCHLORIDE 20 MG/ML
INJECTION, SOLUTION EPIDURAL; INFILTRATION; INTRACAUDAL; PERINEURAL PRN
Status: DISCONTINUED | OUTPATIENT
Start: 2017-06-08 | End: 2017-06-08 | Stop reason: SDUPTHER

## 2017-06-08 RX ORDER — METOCLOPRAMIDE HYDROCHLORIDE 5 MG/ML
10 INJECTION INTRAMUSCULAR; INTRAVENOUS
Status: DISCONTINUED | OUTPATIENT
Start: 2017-06-08 | End: 2017-06-08 | Stop reason: HOSPADM

## 2017-06-08 RX ORDER — ACETAMINOPHEN 325 MG/1
650 TABLET ORAL EVERY 4 HOURS PRN
Status: CANCELLED | OUTPATIENT
Start: 2017-06-08

## 2017-06-08 RX ORDER — SODIUM CHLORIDE 0.9 % (FLUSH) 0.9 %
10 SYRINGE (ML) INJECTION PRN
Status: CANCELLED | OUTPATIENT
Start: 2017-06-08

## 2017-06-08 RX ORDER — ONDANSETRON 2 MG/ML
4 INJECTION INTRAMUSCULAR; INTRAVENOUS EVERY 6 HOURS PRN
Status: CANCELLED | OUTPATIENT
Start: 2017-06-08

## 2017-06-08 RX ORDER — OXYCODONE HYDROCHLORIDE AND ACETAMINOPHEN 5; 325 MG/1; MG/1
2 TABLET ORAL EVERY 4 HOURS PRN
Status: CANCELLED | OUTPATIENT
Start: 2017-06-08

## 2017-06-08 RX ADMIN — FENTANYL CITRATE 50 MCG: 50 INJECTION, SOLUTION INTRAMUSCULAR; INTRAVENOUS at 07:30

## 2017-06-08 RX ADMIN — PROPOFOL 200 MG: 10 INJECTION, EMULSION INTRAVENOUS at 07:31

## 2017-06-08 RX ADMIN — ONDANSETRON 2 MG: 2 INJECTION, SOLUTION INTRAMUSCULAR; INTRAVENOUS at 07:35

## 2017-06-08 RX ADMIN — OXYCODONE HYDROCHLORIDE AND ACETAMINOPHEN 1 TABLET: 5; 325 TABLET ORAL at 09:11

## 2017-06-08 RX ADMIN — LIDOCAINE HYDROCHLORIDE 50 MG: 20 INJECTION, SOLUTION EPIDURAL; INFILTRATION; INTRACAUDAL; PERINEURAL at 07:31

## 2017-06-08 RX ADMIN — Medication 2 G: at 07:26

## 2017-06-08 RX ADMIN — DEXAMETHASONE SODIUM PHOSPHATE 4 MG: 10 INJECTION INTRAMUSCULAR; INTRAVENOUS at 07:35

## 2017-06-08 RX ADMIN — MIDAZOLAM HYDROCHLORIDE 2 MG: 1 INJECTION, SOLUTION INTRAMUSCULAR; INTRAVENOUS at 07:20

## 2017-06-08 RX ADMIN — SODIUM CHLORIDE, POTASSIUM CHLORIDE, SODIUM LACTATE AND CALCIUM CHLORIDE: 600; 310; 30; 20 INJECTION, SOLUTION INTRAVENOUS at 08:00

## 2017-06-08 RX ADMIN — SODIUM CHLORIDE, POTASSIUM CHLORIDE, SODIUM LACTATE AND CALCIUM CHLORIDE 1000 ML: 600; 310; 30; 20 INJECTION, SOLUTION INTRAVENOUS at 06:42

## 2017-06-08 RX ADMIN — FENTANYL CITRATE 50 MCG: 50 INJECTION, SOLUTION INTRAMUSCULAR; INTRAVENOUS at 07:40

## 2017-06-08 RX ADMIN — LIDOCAINE HYDROCHLORIDE 0.1 ML: 10 INJECTION, SOLUTION EPIDURAL; INFILTRATION; INTRACAUDAL; PERINEURAL at 06:45

## 2017-06-08 ASSESSMENT — PAIN SCALES - GENERAL: PAINLEVEL_OUTOF10: 5

## 2017-06-08 ASSESSMENT — PAIN - FUNCTIONAL ASSESSMENT: PAIN_FUNCTIONAL_ASSESSMENT: 0-10

## 2017-06-08 ASSESSMENT — PAIN DESCRIPTION - DESCRIPTORS: DESCRIPTORS: ACHING;SHARP

## 2017-06-26 ENCOUNTER — HOSPITAL ENCOUNTER (OUTPATIENT)
Dept: PHYSICAL THERAPY | Age: 47
Setting detail: THERAPIES SERIES
Discharge: HOME | End: 2017-06-26
Payer: COMMERCIAL

## 2017-06-26 PROCEDURE — 97110 THERAPEUTIC EXERCISES: CPT

## 2017-06-26 PROCEDURE — 97161 PT EVAL LOW COMPLEX 20 MIN: CPT

## 2017-06-27 ENCOUNTER — HOSPITAL ENCOUNTER (OUTPATIENT)
Dept: PHYSICAL THERAPY | Age: 47
Setting detail: THERAPIES SERIES
Discharge: HOME | End: 2017-06-27
Payer: COMMERCIAL

## 2017-06-28 ENCOUNTER — HOSPITAL ENCOUNTER (OUTPATIENT)
Dept: PHYSICAL THERAPY | Age: 47
Setting detail: THERAPIES SERIES
End: 2017-06-28
Payer: COMMERCIAL

## 2017-09-11 ENCOUNTER — HOSPITAL ENCOUNTER (OUTPATIENT)
Dept: PHYSICAL THERAPY | Age: 47
Setting detail: THERAPIES SERIES
Discharge: HOME | End: 2017-09-11
Payer: COMMERCIAL

## 2017-09-11 PROCEDURE — 97162 PT EVAL MOD COMPLEX 30 MIN: CPT

## 2017-09-11 ASSESSMENT — PAIN DESCRIPTION - ORIENTATION: ORIENTATION: LEFT;RIGHT

## 2017-09-11 ASSESSMENT — PAIN SCALES - GENERAL: PAINLEVEL_OUTOF10: 4

## 2017-09-11 ASSESSMENT — PAIN DESCRIPTION - LOCATION: LOCATION: KNEE

## 2017-09-13 ENCOUNTER — HOSPITAL ENCOUNTER (OUTPATIENT)
Dept: PHYSICAL THERAPY | Age: 47
Setting detail: THERAPIES SERIES
Discharge: HOME | End: 2017-09-13
Payer: COMMERCIAL

## 2017-09-14 ENCOUNTER — HOSPITAL ENCOUNTER (OUTPATIENT)
Dept: PHYSICAL THERAPY | Age: 47
Setting detail: THERAPIES SERIES
Discharge: HOME | End: 2017-09-14
Payer: COMMERCIAL

## 2017-09-18 ENCOUNTER — HOSPITAL ENCOUNTER (OUTPATIENT)
Dept: PHYSICAL THERAPY | Age: 47
Setting detail: THERAPIES SERIES
Discharge: HOME | End: 2017-09-18
Payer: COMMERCIAL

## 2017-09-18 PROCEDURE — 97113 AQUATIC THERAPY/EXERCISES: CPT

## 2017-09-18 ASSESSMENT — PAIN DESCRIPTION - LOCATION: LOCATION: KNEE

## 2017-09-18 ASSESSMENT — PAIN SCALES - GENERAL: PAINLEVEL_OUTOF10: 7

## 2017-09-18 ASSESSMENT — PAIN DESCRIPTION - ORIENTATION: ORIENTATION: RIGHT;LEFT

## 2017-09-20 ENCOUNTER — APPOINTMENT (OUTPATIENT)
Dept: PHYSICAL THERAPY | Age: 47
End: 2017-09-20
Payer: COMMERCIAL

## 2017-09-21 ENCOUNTER — HOSPITAL ENCOUNTER (OUTPATIENT)
Dept: PHYSICAL THERAPY | Age: 47
Setting detail: THERAPIES SERIES
Discharge: HOME | End: 2017-09-21
Payer: COMMERCIAL

## 2017-09-21 PROCEDURE — 97113 AQUATIC THERAPY/EXERCISES: CPT

## 2017-09-21 ASSESSMENT — PAIN SCALES - GENERAL: PAINLEVEL_OUTOF10: 10

## 2017-09-22 ENCOUNTER — HOSPITAL ENCOUNTER (OUTPATIENT)
Dept: PHYSICAL THERAPY | Age: 47
Setting detail: THERAPIES SERIES
Discharge: HOME | End: 2017-09-22
Payer: COMMERCIAL

## 2017-09-22 ENCOUNTER — APPOINTMENT (OUTPATIENT)
Dept: PHYSICAL THERAPY | Age: 47
End: 2017-09-22
Payer: COMMERCIAL

## 2017-09-22 PROCEDURE — 97113 AQUATIC THERAPY/EXERCISES: CPT

## 2017-09-22 ASSESSMENT — PAIN SCALES - GENERAL: PAINLEVEL_OUTOF10: 4

## 2017-09-22 ASSESSMENT — PAIN DESCRIPTION - LOCATION: LOCATION: KNEE

## 2017-09-26 ENCOUNTER — APPOINTMENT (OUTPATIENT)
Dept: PHYSICAL THERAPY | Age: 47
End: 2017-09-26
Payer: COMMERCIAL

## 2017-09-26 ENCOUNTER — HOSPITAL ENCOUNTER (OUTPATIENT)
Dept: PHYSICAL THERAPY | Age: 47
Setting detail: THERAPIES SERIES
Discharge: HOME | End: 2017-09-26
Payer: COMMERCIAL

## 2017-09-26 PROCEDURE — 97113 AQUATIC THERAPY/EXERCISES: CPT

## 2017-09-26 ASSESSMENT — PAIN SCALES - GENERAL: PAINLEVEL_OUTOF10: 8

## 2017-09-28 ENCOUNTER — APPOINTMENT (OUTPATIENT)
Dept: PHYSICAL THERAPY | Age: 47
End: 2017-09-28
Payer: COMMERCIAL

## 2017-09-28 ENCOUNTER — HOSPITAL ENCOUNTER (OUTPATIENT)
Dept: PHYSICAL THERAPY | Age: 47
Setting detail: THERAPIES SERIES
End: 2017-09-28
Payer: COMMERCIAL

## 2017-09-29 ENCOUNTER — APPOINTMENT (OUTPATIENT)
Dept: PHYSICAL THERAPY | Age: 47
End: 2017-09-29
Payer: COMMERCIAL

## 2017-10-02 ENCOUNTER — HOSPITAL ENCOUNTER (OUTPATIENT)
Dept: PHYSICAL THERAPY | Age: 47
Setting detail: THERAPIES SERIES
End: 2017-10-02
Payer: COMMERCIAL

## 2017-10-03 ENCOUNTER — HOSPITAL ENCOUNTER (OUTPATIENT)
Dept: PHYSICAL THERAPY | Age: 47
Setting detail: THERAPIES SERIES
Discharge: HOME | End: 2017-10-03
Payer: COMMERCIAL

## 2017-10-03 PROCEDURE — 97113 AQUATIC THERAPY/EXERCISES: CPT

## 2017-10-03 ASSESSMENT — PAIN SCALES - GENERAL: PAINLEVEL_OUTOF10: 10

## 2017-10-03 NOTE — PROGRESS NOTES
Phone: Piyush Juárez      Fax: [phone]                            Outpatient Physical Therapy                                                                            Daily Note    Date: 10/3/2017  Patient Name: Dexter Sherman        MRN: [id number]   ACCT#:  [de-identified]  : 1970  (48 y.o.)    Referring Practitioner: Dr Shashi Walker    Referral Date : 17    Diagnosis: knee pain M25.569  Treatment Diagnosis: Bilateral knee pain    Onset Date: 17  PT Insurance Information: [employer]- 18 remaining  Total # of Visits Approved: 18 Per Physician Order  Total # of Visits to Date: 6  No Show: 1  Canceled Appointment: 1    Pre-Treatment Pain:  10/10  Subjective: Next Dr england 10/27/17  Assessment  Assessment: Pt reports receiving an injection in her left knee on 17. AROM prior to pool 55 deg flexion. She  c/o pain 1010 , notes doing lots of lifting etc d/t having a garage sale. She reports no benefit from injection. She notes 1 day of less pain after pool session. After session pt with 130 deg AROM left knee flexion. Chart Reviewed: Yes    Plan  Plan: Continue with current plan    Exercises/Modalities/Manual:  See DocFlow Sheet    Education:   Patient Education: Discussed pt attending a pool at a [facility] long term. Goals  (Total # of Visits to Date: 6)   Short Term Goals - Time Frame for Short term goals: 9  Short term goal 1: Increase ROM left knee flexion 115 degrees-met  Short term goal 2: Increase strength right knee extension 4/5-MET  Short term goal 3:  Increase balance with SLS left leg to 20 seconds 1 of 3 trials          Long Term Goals - Time Frame for Long term goals : 25  Long term goal 1: Decrease pain 2/10 bilateral knees x 3 days  Long term goal 2: Gait WFL without knee cap \"popping out\" x 1 week per patient report  Long term goal 3: Patient to ambulate up stairs WFL without using arms to assist  Long term goal 4: Improve

## 2017-10-04 ENCOUNTER — HOSPITAL ENCOUNTER (OUTPATIENT)
Dept: PHYSICAL THERAPY | Age: 47
Setting detail: THERAPIES SERIES
Discharge: HOME | End: 2017-10-04
Payer: COMMERCIAL

## 2017-10-04 ENCOUNTER — HOSPITAL ENCOUNTER (OUTPATIENT)
Dept: PHYSICAL THERAPY | Age: 47
Setting detail: THERAPIES SERIES
End: 2017-10-04
Payer: COMMERCIAL

## 2017-10-04 PROCEDURE — 97113 AQUATIC THERAPY/EXERCISES: CPT

## 2017-10-04 ASSESSMENT — PAIN SCALES - GENERAL: PAINLEVEL_OUTOF10: 4

## 2017-10-04 ASSESSMENT — PAIN DESCRIPTION - ORIENTATION: ORIENTATION: LEFT

## 2017-10-04 ASSESSMENT — PAIN DESCRIPTION - LOCATION: LOCATION: KNEE

## 2017-10-06 ENCOUNTER — APPOINTMENT (OUTPATIENT)
Dept: PHYSICAL THERAPY | Age: 47
End: 2017-10-06
Payer: COMMERCIAL

## 2017-10-06 ENCOUNTER — HOSPITAL ENCOUNTER (OUTPATIENT)
Dept: PHYSICAL THERAPY | Age: 47
Setting detail: THERAPIES SERIES
End: 2017-10-06
Payer: COMMERCIAL

## 2017-10-09 ENCOUNTER — HOSPITAL ENCOUNTER (OUTPATIENT)
Dept: PHYSICAL THERAPY | Age: 47
Setting detail: THERAPIES SERIES
Discharge: HOME | End: 2017-10-09
Payer: COMMERCIAL

## 2017-10-09 ENCOUNTER — APPOINTMENT (OUTPATIENT)
Dept: PHYSICAL THERAPY | Age: 47
End: 2017-10-09
Payer: COMMERCIAL

## 2017-10-09 PROCEDURE — 97113 AQUATIC THERAPY/EXERCISES: CPT

## 2017-10-09 NOTE — PROGRESS NOTES
Phone: Piyush Juárez      Fax: [phone]                            Outpatient Physical Therapy                                                                            Daily Note    Date: 10/9/2017  Patient Name: Christa Zamora        MRN: [id number]   ACCT#:  [de-identified]  : 1970  (50 y.o.)    Referring Practitioner: Dr Berger Overall    Referral Date : 17    Diagnosis: knee pain M25.569  Treatment Diagnosis: Bilateral knee pain    Onset Date: 17  PT Insurance Information: [employer]- 18 remaining  Total # of Visits Approved: 18 Per Physician Order  Total # of Visits to Date: 8  No Show: 1  Canceled Appointment: 1    Pre-Treatment Pain:  0/10  Subjective: Next Dr appt 10/27/17  Assessment  Assessment: Pt 15 min late for appt. Pt reports no knee pain this morning. She notes pain 7-8/10 over the weekend with knee \"popping out\" several times. She used her TENS unit on her knees over the weekend with relief. Plan to check SLS. Chart Reviewed: Yes    Plan  Plan: Continue with current plan    Exercises/Modalities/Manual:  See DocFlow Sheet    Education:   Patient Education: Discussed pt attending a pool at a [facility] center long term. Goals  (Total # of Visits to Date: 8)   Short Term Goals - Time Frame for Short term goals: 9  Short term goal 1: Increase ROM left knee flexion 115 degrees-met  Short term goal 2: Increase strength right knee extension 4/5-MET  Short term goal 3:  Increase balance with SLS left leg to 20 seconds 1 of 3 trials    Long Term Goals - Time Frame for Long term goals : 25  Long term goal 1: Decrease pain 2/10 bilateral knees x 3 days  Long term goal 2: Gait WFL without knee cap \"popping out\" x 1 week per patient report  Long term goal 3: Patient to ambulate up stairs WFL without using arms to assist  Long term goal 4: Improve function on LEFS with score of 40/80    Post Treatment Pain:  0/10    Time In: 0830  Time Out: 0639     42

## 2017-10-12 ENCOUNTER — HOSPITAL ENCOUNTER (OUTPATIENT)
Dept: PHYSICAL THERAPY | Age: 47
Setting detail: THERAPIES SERIES
Discharge: HOME | End: 2017-10-12
Payer: COMMERCIAL

## 2017-10-12 ENCOUNTER — APPOINTMENT (OUTPATIENT)
Dept: PHYSICAL THERAPY | Age: 47
End: 2017-10-12
Payer: COMMERCIAL

## 2017-10-12 PROCEDURE — 97113 AQUATIC THERAPY/EXERCISES: CPT

## 2017-10-12 ASSESSMENT — PAIN SCALES - GENERAL: PAINLEVEL_OUTOF10: 4

## 2017-10-12 NOTE — PROGRESS NOTES
Phone: Piyush Juárez      Fax: [phone]                            Outpatient Physical Therapy                                                                            Daily Note    Date: 10/12/2017  Patient Name: Christa Zamora        MRN: [id number]   ACCT#:  [de-identified]  : 1970  (50 y.o.)    Referring Practitioner: Dr Berger Overall    Referral Date : 17    Diagnosis: knee pain M25.569  Treatment Diagnosis: Bilateral knee pain    Onset Date: 17  PT Insurance Information: [employer]- 18 remaining  Total # of Visits Approved: 18 Per Physician Order  Total # of Visits to Date: 9  No Show: 1  Canceled Appointment: 1    Pre-Treatment Pain:  4/10  Subjective: Next  appt 10/27/17  Assessment  Assessment: Pt  8 min late . Pt seen for aquatic therapy sesion with good tolerance. Pt independent with program and encouraged to cont long term in pool on her own. Plan to cont 1 more pool visit and transition to land next week. She is not taking pain meds  but is using her TENS unit on her knees. Pt reports she does plan to get a pool membership. Chart Reviewed: Yes    Plan  Plan: Continue with current plan    Exercises/Modalities/Manual:  See DocFlow Sheet      Goals  (Total # of Visits to Date: 5)   Short Term Goals - Time Frame for Short term goals: 9  Short term goal 1: Increase ROM left knee flexion 115 degrees-met  Short term goal 2: Increase strength right knee extension 4/5-MET  Short term goal 3:  Increase balance with SLS left leg to 20 seconds 1 of 3 trials    Long Term Goals - Time Frame for Long term goals : 25  Long term goal 1: Decrease pain 2/10 bilateral knees x 3 days  Long term goal 2: Gait WFL without knee cap \"popping out\" x 1 week per patient report  Long term goal 3: Patient to ambulate up stairs WFL without using arms to assist  Long term goal 4: Improve function on LEFS with score of 40/80       Post Treatment Pain:  0/10      Time In: 5278  Time Out: 2722     30 timed and total  Minutes    Megan Bonilla Therapy License Number: [id number]    Date: 10/12/2017

## 2017-10-13 ENCOUNTER — APPOINTMENT (OUTPATIENT)
Dept: PHYSICAL THERAPY | Age: 47
End: 2017-10-13
Payer: COMMERCIAL

## 2017-10-13 ENCOUNTER — HOSPITAL ENCOUNTER (OUTPATIENT)
Dept: PHYSICAL THERAPY | Age: 47
Setting detail: THERAPIES SERIES
Discharge: HOME | End: 2017-10-13
Payer: COMMERCIAL

## 2017-10-13 PROCEDURE — 97113 AQUATIC THERAPY/EXERCISES: CPT

## 2017-10-13 ASSESSMENT — PAIN DESCRIPTION - LOCATION: LOCATION: KNEE

## 2017-10-13 ASSESSMENT — PAIN SCALES - GENERAL: PAINLEVEL_OUTOF10: 3

## 2017-10-13 ASSESSMENT — PAIN DESCRIPTION - ORIENTATION: ORIENTATION: LEFT

## 2017-10-13 NOTE — PROGRESS NOTES
Phone: Piyush Juárez      Fax: [phone]                            Outpatient Physical Therapy                                                                            Daily Note    Date: 10/13/2017  Patient Name: Darrick Vaughan        MRN: [id number]   ACCT#:  [de-identified]  : 1970  (50 y.o.)    Referring Practitioner: Dr Winnie Holloway    Referral Date : 17    Diagnosis: knee pain M25.569  Treatment Diagnosis: Bilateral knee pain    Onset Date: 17  PT [facility]  Total # of Visits Approved: 18 Per Physician Order       Pre-Treatment Pain:  3/10  Subjective: Next  appt 10/27/17  Assessment  Assessment: Pain 3/10 in knees today. Main c/o left knee still gives out especially with walking stairs. . Progress to gym program next visit. Chart Reviewed: Yes    Plan  Plan: Continue with current plan    Exercises/Modalities/Manual:  See DocFlow Sheet    Education:           Goals  ( )   Short Term Goals - Time Frame for Short term goals: 9  Short term goal 1: Increase ROM left knee flexion 115 degrees-met  Short term goal 2: Increase strength right knee extension 4/5-MET  Short term goal 3:  Increase balance with SLS left leg to 20 seconds 1 of 3 trials          Long Term Goals - Time Frame for Long term goals : 25  Long term goal 1: Decrease pain 2/10 bilateral knees x 3 days  Long term goal 2: Gait WFL without knee cap \"popping out\" x 1 week per patient report  Long term goal 3: Patient to ambulate up stairs WFL without using arms to assist  Long term goal 4: Improve function on LEFS with score of 40/80       Post Treatment Pain:  3/10    Time In: 0730  Time Out: 0805       Minutes    Bobbe Lapping Therapy License Number: [id number]    Date: 10/13/2017

## 2017-10-16 ENCOUNTER — HOSPITAL ENCOUNTER (OUTPATIENT)
Dept: PHYSICAL THERAPY | Age: 47
Setting detail: THERAPIES SERIES
Discharge: HOME | End: 2017-10-16
Payer: COMMERCIAL

## 2017-10-16 PROCEDURE — 97110 THERAPEUTIC EXERCISES: CPT

## 2017-10-16 ASSESSMENT — PAIN DESCRIPTION - LOCATION: LOCATION: KNEE

## 2017-10-16 ASSESSMENT — PAIN SCALES - GENERAL: PAINLEVEL_OUTOF10: 3

## 2017-10-16 ASSESSMENT — PAIN DESCRIPTION - ORIENTATION: ORIENTATION: LEFT

## 2017-10-17 ENCOUNTER — HOSPITAL ENCOUNTER (OUTPATIENT)
Dept: PHYSICAL THERAPY | Age: 47
Setting detail: THERAPIES SERIES
Discharge: HOME | End: 2017-10-17
Payer: COMMERCIAL

## 2017-10-20 ENCOUNTER — HOSPITAL ENCOUNTER (OUTPATIENT)
Dept: PHYSICAL THERAPY | Age: 47
Setting detail: THERAPIES SERIES
Discharge: HOME | End: 2017-10-20
Payer: COMMERCIAL

## 2017-10-20 NOTE — PROGRESS NOTES
[facility]    Date: 10/20/2017  Patient Name: Orly Courtney        : 1970       [] Pt No Showed Appt           [x] Pt Cancelled Appt:  [] No Reason Given   [x] Sick/ill   [] Other:      Angy Storm Date: 10/20/2017

## 2017-10-26 ENCOUNTER — HOSPITAL ENCOUNTER (OUTPATIENT)
Dept: PHYSICAL THERAPY | Age: 47
Setting detail: THERAPIES SERIES
Discharge: HOME | End: 2017-10-26
Payer: COMMERCIAL

## 2017-11-07 ENCOUNTER — HOSPITAL ENCOUNTER (OUTPATIENT)
Dept: PHYSICAL THERAPY | Age: 47
Setting detail: THERAPIES SERIES
Discharge: HOME | End: 2017-11-07
Payer: COMMERCIAL

## 2017-11-07 PROCEDURE — 97164 PT RE-EVAL EST PLAN CARE: CPT

## 2017-11-07 PROCEDURE — 97110 THERAPEUTIC EXERCISES: CPT

## 2017-11-07 ASSESSMENT — PAIN DESCRIPTION - LOCATION: LOCATION: KNEE

## 2017-11-07 ASSESSMENT — PAIN DESCRIPTION - ORIENTATION: ORIENTATION: LEFT

## 2017-11-07 ASSESSMENT — PAIN SCALES - GENERAL: PAINLEVEL_OUTOF10: 2

## 2017-11-09 ENCOUNTER — HOSPITAL ENCOUNTER (OUTPATIENT)
Dept: PHYSICAL THERAPY | Age: 47
Setting detail: THERAPIES SERIES
Discharge: HOME | End: 2017-11-09
Payer: COMMERCIAL

## 2017-11-09 PROCEDURE — 97110 THERAPEUTIC EXERCISES: CPT

## 2017-11-09 ASSESSMENT — PAIN SCALES - GENERAL: PAINLEVEL_OUTOF10: 2

## 2017-11-09 ASSESSMENT — PAIN DESCRIPTION - ORIENTATION: ORIENTATION: LEFT

## 2017-11-09 ASSESSMENT — PAIN DESCRIPTION - LOCATION: LOCATION: KNEE

## 2017-11-13 ENCOUNTER — HOSPITAL ENCOUNTER (OUTPATIENT)
Dept: PHYSICAL THERAPY | Age: 47
Setting detail: THERAPIES SERIES
Discharge: HOME | End: 2017-11-13
Payer: COMMERCIAL

## 2017-11-13 PROCEDURE — 97110 THERAPEUTIC EXERCISES: CPT

## 2017-11-13 ASSESSMENT — PAIN SCALES - GENERAL: PAINLEVEL_OUTOF10: 2

## 2017-11-13 NOTE — PROGRESS NOTES
Phone: Piyush Juárez      Fax: [phone]                            Outpatient Physical Therapy                                                                            Daily Note    Date: 2017  Patient Name: Christa Zamora        MRN: [id number]   ACCT#:  [de-identified]  : 1970  (50 y.o.)    Referring Practitioner: Dr Chai Dyson PA-C    Referral Date : 10/27/17    Diagnosis: Knee pain, Knee sprain and strain  Treatment Diagnosis: left knee pain    Onset Date: 17  PT Insurance Information: [facility]  Total # of Visits Approved: 12 Per Physician Order  Total # of Visits to Date: 3    Pre-Treatment Pain:  0/10     Assessment  Assessment: Patient to foot doctor this morning and she needed to have a shot in her L ankle. Per her physician, she will be limited today on her exerises that affect her L LE.  able to complete exercise per doc flowsheet. Denies pain in her knee throughout session. Chart Reviewed: Yes    Plan  Plan: Continue with current plan    Exercises/Modalities/Manual:  See DocFlow Sheet    Education:           Goals  (Total # of Visits to Date: 3)   Short Term Goals - Time Frame for Short term goals: 8  Short term goal 1: Increase strength left knee flexion 5/5  Short term goal 2: Increase strength left knee extension 4+/5   Short term goal 3:  Increase balance with SLS left leg to 20 seconds 1 of 3 trials          Long Term Goals - Time Frame for Long term goals : 12  Long term goal 1: Patient to ambulate up stairs WFL without using arms to assist  Long term goal 2: Gait WFL without knee cap \"popping out\" x 1 week per patient report  Long term goal 3: Improve function on LEFS with score of 40/80          Post Treatment Pain:  0/10    Time In: 1345  Time Out: [facility] Number: [id number]    Date: 2017

## 2017-11-16 ENCOUNTER — HOSPITAL ENCOUNTER (OUTPATIENT)
Dept: PHYSICAL THERAPY | Age: 47
Setting detail: THERAPIES SERIES
Discharge: HOME | End: 2017-11-16
Payer: COMMERCIAL

## 2017-11-16 PROCEDURE — 97110 THERAPEUTIC EXERCISES: CPT

## 2017-11-16 ASSESSMENT — PAIN SCALES - GENERAL: PAINLEVEL_OUTOF10: 3

## 2017-11-16 ASSESSMENT — PAIN DESCRIPTION - LOCATION: LOCATION: KNEE

## 2017-11-16 ASSESSMENT — PAIN DESCRIPTION - ORIENTATION: ORIENTATION: LEFT

## 2017-11-16 NOTE — PROGRESS NOTES
Phone: Piyush Juárez      Fax: [phone]                            Outpatient Physical Therapy                                                                            Daily Note    Date: 2017  Patient Name: Abel Bond        MRN: [id number]   ACCT#:  [de-identified]  : 1970  (50 y.o.)    Referring Practitioner: Dr Jenny Garcia PA-C    Referral Date : 10/27/17    Diagnosis: Knee pain, Knee sprain and strain  Treatment Diagnosis: left knee pain    Onset Date: 17  PT Insurance Information: [employer]  Total # of Visits Approved: 12 Per Physician Order  Total # of Visits to Date: 4    Pre-Treatment Pain:  3/10     Assessment  Assessment: Strength left knee flexion 5/5, extension 4+/5. unable to work SLS balance dur to patient reports foot  told her not to. Patient ambulating with mild limp today, c/o pain 3/10 in knee. One visit remaioning on [employer], plan to send progress report next session. Chart Reviewed: Yes    Plan  Plan: Continue with current plan    Exercises/Modalities/Manual:  See DocFlow Sheet    Education:         Goals  (Total # of Visits to Date: 4)   Short Term Goals - Time Frame for Short term goals: 8  Short term goal 1: Increase strength left knee flexion 5/5-MET  Short term goal 2: Increase strength left knee extension 4+/5 -MET  Short term goal 3:  Increase balance with SLS left leg to 20 seconds 1 of 3 trials          Long Term Goals - Time Frame for Long term goals : 12  Long term goal 1: Patient to ambulate up stairs WFL without using arms to assist  Long term goal 2: Gait WFL without knee cap \"popping out\" x 1 week per patient report  Long term goal 3: Improve function on LEFS with score of 40/80          Post Treatment Pain:  3/10      Time In: 1055  Time Out: 1130      35 Minutes    [facility] License Number: [id number]    Date: 2017

## 2017-11-21 ENCOUNTER — HOSPITAL ENCOUNTER (OUTPATIENT)
Dept: PHYSICAL THERAPY | Age: 47
Setting detail: THERAPIES SERIES
Discharge: HOME | End: 2017-11-21
Payer: COMMERCIAL

## 2017-11-21 PROCEDURE — 97110 THERAPEUTIC EXERCISES: CPT

## 2017-11-21 ASSESSMENT — PAIN DESCRIPTION - ORIENTATION: ORIENTATION: LEFT

## 2017-11-21 ASSESSMENT — PAIN DESCRIPTION - LOCATION: LOCATION: KNEE

## 2017-11-21 ASSESSMENT — PAIN SCALES - GENERAL: PAINLEVEL_OUTOF10: 3

## 2017-11-21 NOTE — PROGRESS NOTES
Phone: Piyush Juárez            Fax: [phone]                             Outpatient Physical Therapy                                                                      Progress Report    Date: 2017   MRN: [id number]    ACCT#: [de-identified]  Patient: Froilan Canales  : 1970  Referring Practitioner: Dr Nicole Sarabia PA-C    Referral Date : 10/27/17    Diagnosis: Knee pain, Knee sprain and strain      Treatment Diagnosis: left knee pain    Onset Date: 17  PT Insurance Information: [facility]  Total # of Visits Approved: 12 Per Physician Order  Total # of Visits to Date: 5    Assessment  Assessment: Patient c/o left knee gave out when walking up stairs yesterday causing her to fall. Strength left knee flexion 5/5, extension 4+/5. unable to work SLS balance dur to patient reports foot  told her not to. Patient ambulating with mild limp today, c/o pain 3/10 in knee. Prognosis: Good    Plan  Requesting insurance approval for 7 more PT sessions to complete Plan of Care    Goals  Short term goals  Time Frame for Short term goals: 8  Short term goal 1: Increase strength left knee flexion 5/5-MET  Short term goal 2: Increase strength left knee extension 4+/5 -MET  Short term goal 3:  Increase balance with SLS left leg to 20 seconds 1 of 3 trials    Long term goals  Time Frame for Long term goals : 12  Long term goal 1: Patient to ambulate up stairs WFL without using arms to assist  Long term goal 2: Gait WFL without knee cap \"popping out\" x 1 week per patient report  Long term goal 3: Improve function on LEFS with score of 40/80    Ugo Guthrie Therapy License Number: [id number]     Date: 2017

## 2017-11-21 NOTE — PROGRESS NOTES
Phone: Piyush Juárez      Fax: [phone]                            Outpatient Physical Therapy                                                                            Daily Note    Date: 2017  Patient Name: Dany León        MRN: [id number]   ACCT#:  [de-identified]  : 1970  (50 y.o.)    Referring Practitioner: Dr Karsten Sheffield PA-C    Referral Date : 10/27/17    Diagnosis: Knee pain, Knee sprain and strain  Treatment Diagnosis: left knee pain    Onset Date: 17  PT Insurance Information: [facility]  Total # of Visits Approved: 12 Per Physician Order  Total # of Visits to Date: 5    Pre-Treatment Pain:  3/10     Assessment  Assessment: Patient c/o left knee gave out when walking up stairs yesterday causing her to fall. Strength left knee flexion 5/5, extension 4+/5. unable to work SLS balance dur to patient reports foot Dr told her not to. Patient ambulating with mild limp today, c/o pain 3/10 in knee. Chart Reviewed: Yes    Plan  Plan: Continue with current plan    Exercises/Modalities/Manual:  See DocFlow Sheet    Education:           Goals  (Total # of Visits to Date: 5)   Short Term Goals - Time Frame for Short term goals: 8  Short term goal 1: Increase strength left knee flexion 5/5-MET  Short term goal 2: Increase strength left knee extension 4+/5 -MET  Short term goal 3:  Increase balance with SLS left leg to 20 seconds 1 of 3 trials          Long Term Goals - Time Frame for Long term goals : 12  Long term goal 1: Patient to ambulate up stairs WFL without using arms to assist  Long term goal 2: Gait WFL without knee cap \"popping out\" x 1 week per patient report  Long term goal 3: Improve function on LEFS with score of 40/80          Post Treatment Pain:  2/10    Time In: 1346  Time Out: 3003       62 Minutes    Rana Din Therapy License Number: [id number]    Date: 2017

## 2017-12-14 ENCOUNTER — APPOINTMENT (OUTPATIENT)
Dept: GENERAL RADIOLOGY | Age: 47
End: 2017-12-14
Payer: COMMERCIAL

## 2017-12-14 ENCOUNTER — HOSPITAL ENCOUNTER (EMERGENCY)
Age: 47
Discharge: HOME | End: 2017-12-14
Attending: FAMILY MEDICINE
Payer: COMMERCIAL

## 2017-12-14 VITALS
WEIGHT: 260 LBS | HEART RATE: 85 BPM | DIASTOLIC BLOOD PRESSURE: 71 MMHG | OXYGEN SATURATION: 99 % | HEIGHT: 64 IN | BODY MASS INDEX: 44.39 KG/M2 | TEMPERATURE: 98.5 F | SYSTOLIC BLOOD PRESSURE: 100 MMHG | RESPIRATION RATE: 16 BRPM

## 2017-12-14 DIAGNOSIS — S43.402A SPRAIN OF LEFT SHOULDER, UNSPECIFIED SHOULDER SPRAIN TYPE, INITIAL ENCOUNTER: Primary | ICD-10-CM

## 2017-12-14 PROCEDURE — 73030 X-RAY EXAM OF SHOULDER: CPT

## 2017-12-14 PROCEDURE — 73000 X-RAY EXAM OF COLLAR BONE: CPT

## 2017-12-14 PROCEDURE — 99283 EMERGENCY DEPT VISIT LOW MDM: CPT

## 2017-12-14 NOTE — ED PROVIDER NOTES
eMERGENCY dEPARTMENT eNCOUnter        [address] [facility]    Chief Complaint   Patient presents with    Shoulder Injury     fell 5 d ago onto back left shoulder discomfort       HPI    Irene Wang is a 50 y.o. female who presents to ED from Home by private vehicle with . On Saturday she was walking her bulldog and she got pulled and slipped and landed on frozen ground with her left shoulder. Since then the pain has not gotten any better and she can use her shoulder.   She saw her primary care doctor on Monday and was told today since is not better to come here for evaluation     REVIEW OF SYSTEMS    All systems reviewed and positives are in the HPI      PAST MEDICAL HISTORY    Past Medical History:   Diagnosis Date    Arthritis     Asthma     Bipolar disorder ([address]     COPD (chronic obstructive pulmonary disease) ([address]     Diabetes mellitus ([address]     Hyperlipidemia     diet /exercise management    JOSEPH on CPAP     PONV (postoperative nausea and vomiting)     Sleep apnea     Thyroid disease        SURGICAL HISTORY    Past Surgical History:   Procedure Laterality Date    ACHILLES TENDON SURGERY Bilateral     APPENDECTOMY      BREAST SURGERY       SECTION      x2    CHOLECYSTECTOMY      HERNIA REPAIR      HYSTERECTOMY      KNEE SURGERY Left     meniscus    OTHER SURGICAL HISTORY      scar tissue removal from left foot     TX KNEE SCOPE,DIAGNOSTIC Left 3/2/2017    LEFT KNEE ARTHROSCOPIC PARTIAL MEDIAL MENISCECTOMY , LATEX ALLERGY  performed by Jeff Tinoco MD at [facility] Right 2017    RIGHT KNEE ARTHROSCOPY CHONDROPLASTY, LATEX ALLERGY  performed by [facility]oco [facility] at [facility] Bilateral        CURRENT MEDICATIONS    Current Outpatient Rx   Medication Sig Dispense Refill    UNKNOWN TO PATIENT Take 1 tablet by mouth nightly Bipolar medicine      metFORMIN (GLUCOPHAGE) 500 MG tablet Take 500 mg by mouth 3 times daily      levothyroxine (SYNTHROID) 300 MCG tablet Take 200 mcg by mouth Daily 12 15 17 will begin  225 mcg daily      buPROPion (WELLBUTRIN) 75 MG tablet Take 75 mg by mouth daily      ibuprofen (ADVIL;MOTRIN) 800 MG tablet Take 800 mg by mouth every 6 hours as needed for Pain      estrogens conjugated, synthetic A, (CENESTIN) 1.25 MG tablet Take 1 mg by mouth daily.  QUEtiapine (SEROQUEL) 300 MG tablet Take 300 mg by mouth daily      fluticasone-vilanterol (BREO ELLIPTA) 100-25 MCG/INH AEPB inhaler Inhale 1 puff into the lungs daily      ipratropium-albuterol (DUONEB) 0.5-2.5 (3) MG/3ML SOLN nebulizer solution Take 1 vial by nebulization every 4 hours      omeprazole (PRILOSEC) 20 MG capsule Take 20 mg by mouth daily.          ALLERGIES    Allergies   Allergen Reactions    Latex Rash    Bactrim [Sulfamethoxazole-Trimethoprim] Rash    Tape [place] Tape] Rash       FAMILY HISTORY    Family History   Problem Relation Age of Onset    Cancer Mother     Diabetes Mother     Cancer Father     Diabetes Father     Diabetes Sister     Thyroid Disease Sister     Arthritis Sister     Asthma Son        SOCIAL HISTORY    Social History     Social History    Marital status:      Spouse name: N/A    Number of children: N/A    Years of education: N/A     Social History Main Topics    Smoking status: Former Smoker     Years: 15.00     Quit date: 12/1/2010    Smokeless tobacco: Never Used    Alcohol use No    Drug use: No    Sexual activity: Not Asked     Other Topics Concern    None     Social History Narrative    None       PHYSICAL EXAM    VITAL SIGNS: /71   Pulse 85   Temp 98.5 °F (36.9 °C) (Oral)   Resp 16   Ht 5' 4\" (1.626 m)   Wt 260 lb (117.9 kg)   SpO2 99%   BMI 44.63 kg/m²   Constitutional:  Well developed, well nourished, no acute distress, non-toxic appearance   HENT:  Atraumatic, external ears normal, nose normal, oropharynx moist.  Neck- normal range of motion, no tenderness, supple   Respiratory:  No respiratory distress, normal breath sounds. Cardiovascular:  Normal rate, normal rhythm, no murmurs, no gallops, no rubs   GI:  Soft, nondistended, normal bowel sounds, nontender   Musculoskeletal: Tender to palpation of the left clavicle about two thirds of the way out and on the tip of it where it joins shoulder. Pain to palpation of the shoulder anterior section. And shoulder discomfort to raising of the arm to the lateral aspect   Integument:  Well hydrated, no rash   Neurologic:  Cranial nerves II through XII are intact. Motor and sensory function is normal except for the shoulder    RADIOLOGY/PROCEDURES    XR SHOULDER LEFT (MIN 2 VIEWS)   Final Result      Osteoarthritic changes without fracture or dislocation about left clavicle or    shoulder. XR Clavicle Left   Final Result      Osteoarthritic changes without fracture or dislocation about left clavicle or    shoulder. Labs  Labs Reviewed - No data to display          Summation      Patient Course: 39-year-old female who fell down and injured her left shoulder several days ago. X-rays are normal.  She is treated with a sling and recommended to take her ibuprofen 800 mg every 8 hours not when necessary. If she is still hurting in a week she should see the orthopedic surgeon for follow-up. ED Medications administered this visit:  Medications - No data to display    New Prescriptions from this visit:    Discharge Medication List as of 12/14/2017  3:59 PM          Follow-up:  Didi Angulo NP  [address] [place] [address]  [phone]      As needed    Tre Landeros DO  [address] [place] [address]  [phone]    In 1 week  As needed        Final Impression:   1.  Sprain of left shoulder, unspecified shoulder sprain type, initial encounter               (Please note that portions of this note were completed with a voice recognition program.  Efforts were made to edit the dictations but occasionally words are mis-transcribed.)          Myra Archer MD  12/14/17 5960

## 2017-12-21 ENCOUNTER — HOSPITAL ENCOUNTER (OUTPATIENT)
Dept: PREADMISSION TESTING | Age: 47
Discharge: HOME | End: 2017-12-21
Payer: COMMERCIAL

## 2017-12-21 VITALS
BODY MASS INDEX: 43.36 KG/M2 | SYSTOLIC BLOOD PRESSURE: 110 MMHG | HEART RATE: 87 BPM | WEIGHT: 254 LBS | HEIGHT: 64 IN | TEMPERATURE: 97.6 F | OXYGEN SATURATION: 96 % | DIASTOLIC BLOOD PRESSURE: 79 MMHG | RESPIRATION RATE: 16 BRPM

## 2017-12-21 PROBLEM — M17.12 OSTEOARTHRITIS OF LEFT KNEE: Status: ACTIVE | Noted: 2017-12-21

## 2017-12-21 LAB
ABO/RH: NORMAL
ANTIBODY SCREEN: NORMAL
BACTERIA: NORMAL /HPF
BILIRUBIN URINE: NEGATIVE
BLOOD, URINE: ABNORMAL
CLARITY: ABNORMAL
COLOR: YELLOW
EPITHELIAL CELLS, UA: NORMAL /HPF
GLUCOSE URINE: NEGATIVE MG/DL
HBA1C MFR BLD: 5.9 % (ref 4.8–5.9)
KETONES, URINE: NEGATIVE MG/DL
LEUKOCYTE ESTERASE, URINE: NEGATIVE
MUCUS: PRESENT
NITRITE, URINE: NEGATIVE
PH UA: 5 (ref 5–9)
PROTEIN UA: NEGATIVE MG/DL
RBC UA: NORMAL /HPF (ref 0–2)
SPECIFIC GRAVITY UA: 1.01 (ref 1–1.03)
URINE REFLEX TO CULTURE: YES
UROBILINOGEN, URINE: 0.2 E.U./DL
WBC UA: NORMAL /HPF (ref 0–5)

## 2017-12-21 PROCEDURE — 86901 BLOOD TYPING SEROLOGIC RH(D): CPT

## 2017-12-21 PROCEDURE — 86900 BLOOD TYPING SEROLOGIC ABO: CPT

## 2017-12-21 PROCEDURE — 81001 URINALYSIS AUTO W/SCOPE: CPT

## 2017-12-21 PROCEDURE — 87086 URINE CULTURE/COLONY COUNT: CPT

## 2017-12-21 PROCEDURE — 86850 RBC ANTIBODY SCREEN: CPT

## 2017-12-21 PROCEDURE — 83036 HEMOGLOBIN GLYCOSYLATED A1C: CPT

## 2017-12-21 RX ORDER — SODIUM CHLORIDE 0.9 % (FLUSH) 0.9 %
10 SYRINGE (ML) INJECTION EVERY 12 HOURS SCHEDULED
Status: CANCELLED | OUTPATIENT
Start: 2017-12-21

## 2017-12-21 RX ORDER — SODIUM CHLORIDE, SODIUM LACTATE, POTASSIUM CHLORIDE, CALCIUM CHLORIDE 600; 310; 30; 20 MG/100ML; MG/100ML; MG/100ML; MG/100ML
INJECTION, SOLUTION INTRAVENOUS CONTINUOUS
Status: CANCELLED | OUTPATIENT
Start: 2017-12-21

## 2017-12-21 RX ORDER — SODIUM CHLORIDE 0.9 % (FLUSH) 0.9 %
10 SYRINGE (ML) INJECTION PRN
Status: CANCELLED | OUTPATIENT
Start: 2017-12-21

## 2017-12-21 RX ORDER — LIDOCAINE HYDROCHLORIDE 10 MG/ML
1 INJECTION, SOLUTION EPIDURAL; INFILTRATION; INTRACAUDAL; PERINEURAL
Status: CANCELLED | OUTPATIENT
Start: 2017-12-21 | End: 2017-12-21

## 2017-12-22 LAB — URINE CULTURE, ROUTINE: NORMAL

## 2017-12-27 ENCOUNTER — ANESTHESIA EVENT (OUTPATIENT)
Dept: OPERATING ROOM | Age: 47
DRG: 302 | End: 2017-12-27
Payer: COMMERCIAL

## 2017-12-28 ENCOUNTER — APPOINTMENT (OUTPATIENT)
Dept: GENERAL RADIOLOGY | Age: 47
DRG: 302 | End: 2017-12-28
Attending: ORTHOPAEDIC SURGERY
Payer: COMMERCIAL

## 2017-12-28 ENCOUNTER — PREP FOR PROCEDURE (OUTPATIENT)
Dept: ORTHOPEDIC SURGERY | Age: 47
End: 2017-12-28

## 2017-12-28 ENCOUNTER — HOSPITAL ENCOUNTER (INPATIENT)
Age: 47
LOS: 3 days | Discharge: HOME | DRG: 302 | End: 2017-12-31
Attending: ORTHOPAEDIC SURGERY | Admitting: ORTHOPAEDIC SURGERY
Payer: COMMERCIAL

## 2017-12-28 ENCOUNTER — ANESTHESIA (OUTPATIENT)
Dept: OPERATING ROOM | Age: 47
DRG: 302 | End: 2017-12-28
Payer: COMMERCIAL

## 2017-12-28 VITALS — TEMPERATURE: 98.6 F | SYSTOLIC BLOOD PRESSURE: 125 MMHG | OXYGEN SATURATION: 98 % | DIASTOLIC BLOOD PRESSURE: 55 MMHG

## 2017-12-28 DIAGNOSIS — Z96.652 STATUS POST TOTAL LEFT KNEE REPLACEMENT: Primary | ICD-10-CM

## 2017-12-28 LAB
BACTERIA: NORMAL /HPF
BILIRUBIN URINE: NEGATIVE
BLOOD, URINE: ABNORMAL
CLARITY: CLEAR
COLOR: YELLOW
GLUCOSE BLD-MCNC: 101 MG/DL (ref 60–115)
GLUCOSE BLD-MCNC: 120 MG/DL (ref 60–115)
GLUCOSE BLD-MCNC: 124 MG/DL (ref 60–115)
GLUCOSE BLD-MCNC: 126 MG/DL (ref 60–115)
GLUCOSE BLD-MCNC: 150 MG/DL (ref 60–115)
GLUCOSE URINE: NEGATIVE MG/DL
KETONES, URINE: NEGATIVE MG/DL
LEUKOCYTE ESTERASE, URINE: NEGATIVE
NITRITE, URINE: NEGATIVE
PERFORMED ON: ABNORMAL
PERFORMED ON: NORMAL
PH UA: 5 (ref 5–9)
PROTEIN UA: NEGATIVE MG/DL
RBC UA: NORMAL /HPF (ref 0–2)
SPECIFIC GRAVITY UA: 1.02 (ref 1–1.03)
UROBILINOGEN, URINE: 0.2 E.U./DL
WBC UA: NORMAL /HPF (ref 0–5)

## 2017-12-28 PROCEDURE — 7100000000 HC PACU RECOVERY - FIRST 15 MIN: Performed by: ORTHOPAEDIC SURGERY

## 2017-12-28 PROCEDURE — 6360000002 HC RX W HCPCS: Performed by: ORTHOPAEDIC SURGERY

## 2017-12-28 PROCEDURE — 2580000003 HC RX 258: Performed by: STUDENT IN AN ORGANIZED HEALTH CARE EDUCATION/TRAINING PROGRAM

## 2017-12-28 PROCEDURE — 2580000003 HC RX 258: Performed by: NURSE PRACTITIONER

## 2017-12-28 PROCEDURE — 6370000000 HC RX 637 (ALT 250 FOR IP): Performed by: ORTHOPAEDIC SURGERY

## 2017-12-28 PROCEDURE — 2500000003 HC RX 250 WO HCPCS: Performed by: ORTHOPAEDIC SURGERY

## 2017-12-28 PROCEDURE — 2500000003 HC RX 250 WO HCPCS: Performed by: NURSE ANESTHETIST, CERTIFIED REGISTERED

## 2017-12-28 PROCEDURE — 6360000002 HC RX W HCPCS: Performed by: ANESTHESIOLOGY

## 2017-12-28 PROCEDURE — 2580000003 HC RX 258: Performed by: ORTHOPAEDIC SURGERY

## 2017-12-28 PROCEDURE — 3E0T3BZ INTRODUCTION OF ANESTHETIC AGENT INTO PERIPHERAL NERVES AND PLEXI, PERCUTANEOUS APPROACH: ICD-10-PCS | Performed by: ANESTHESIOLOGY

## 2017-12-28 PROCEDURE — 3600000014 HC SURGERY LEVEL 4 ADDTL 15MIN: Performed by: ORTHOPAEDIC SURGERY

## 2017-12-28 PROCEDURE — 0SRD0JA REPLACEMENT OF LEFT KNEE JOINT WITH SYNTHETIC SUBSTITUTE, UNCEMENTED, OPEN APPROACH: ICD-10-PCS | Performed by: ORTHOPAEDIC SURGERY

## 2017-12-28 PROCEDURE — 64447 NJX AA&/STRD FEMORAL NRV IMG: CPT | Performed by: ANESTHESIOLOGY

## 2017-12-28 PROCEDURE — 7100000001 HC PACU RECOVERY - ADDTL 15 MIN: Performed by: ORTHOPAEDIC SURGERY

## 2017-12-28 PROCEDURE — 73560 X-RAY EXAM OF KNEE 1 OR 2: CPT

## 2017-12-28 PROCEDURE — 1210000000 HC MED SURG R&B

## 2017-12-28 PROCEDURE — 94640 AIRWAY INHALATION TREATMENT: CPT

## 2017-12-28 PROCEDURE — 3700000001 HC ADD 15 MINUTES (ANESTHESIA): Performed by: ORTHOPAEDIC SURGERY

## 2017-12-28 PROCEDURE — 3600000004 HC SURGERY LEVEL 4 BASE: Performed by: ORTHOPAEDIC SURGERY

## 2017-12-28 PROCEDURE — 81001 URINALYSIS AUTO W/SCOPE: CPT

## 2017-12-28 PROCEDURE — A6199 ALGINATE DRSG WOUND FILLER: HCPCS | Performed by: ORTHOPAEDIC SURGERY

## 2017-12-28 PROCEDURE — 2720000010 HC SURG SUPPLY STERILE: Performed by: ORTHOPAEDIC SURGERY

## 2017-12-28 PROCEDURE — C1776 JOINT DEVICE (IMPLANTABLE): HCPCS | Performed by: ORTHOPAEDIC SURGERY

## 2017-12-28 PROCEDURE — 6360000002 HC RX W HCPCS: Performed by: NURSE PRACTITIONER

## 2017-12-28 PROCEDURE — 6360000002 HC RX W HCPCS: Performed by: NURSE ANESTHETIST, CERTIFIED REGISTERED

## 2017-12-28 PROCEDURE — 3700000000 HC ANESTHESIA ATTENDED CARE: Performed by: ORTHOPAEDIC SURGERY

## 2017-12-28 PROCEDURE — 2500000003 HC RX 250 WO HCPCS: Performed by: STUDENT IN AN ORGANIZED HEALTH CARE EDUCATION/TRAINING PROGRAM

## 2017-12-28 PROCEDURE — 6370000000 HC RX 637 (ALT 250 FOR IP): Performed by: NURSE PRACTITIONER

## 2017-12-28 PROCEDURE — 87086 URINE CULTURE/COLONY COUNT: CPT

## 2017-12-28 DEVICE — IMPLANTABLE DEVICE: Type: IMPLANTABLE DEVICE | Site: KNEE | Status: FUNCTIONAL

## 2017-12-28 DEVICE — COMPONENT PAT DIA29MM THK9MM SUP INFERIOR KNEE TRITANIUM: Type: IMPLANTABLE DEVICE | Site: KNEE | Status: FUNCTIONAL

## 2017-12-28 DEVICE — COMPONENT TOT KNEE CAPPED STD STRYKNEES] STRYKER CORP]: Type: IMPLANTABLE DEVICE | Site: KNEE | Status: FUNCTIONAL

## 2017-12-28 DEVICE — BASEPLATE TIB SZ 4 AP46MM ML70MM KNEE TRITANIUM 4 CRUCFRM: Type: IMPLANTABLE DEVICE | Site: KNEE | Status: FUNCTIONAL

## 2017-12-28 RX ORDER — SODIUM CHLORIDE 0.9 % (FLUSH) 0.9 %
10 SYRINGE (ML) INJECTION PRN
Status: DISCONTINUED | OUTPATIENT
Start: 2017-12-28 | End: 2017-12-28 | Stop reason: HOSPADM

## 2017-12-28 RX ORDER — ACETAMINOPHEN 325 MG/1
650 TABLET ORAL EVERY 4 HOURS PRN
Status: DISCONTINUED | OUTPATIENT
Start: 2017-12-28 | End: 2017-12-31 | Stop reason: HOSPADM

## 2017-12-28 RX ORDER — OXYCODONE HYDROCHLORIDE AND ACETAMINOPHEN 5; 325 MG/1; MG/1
1 TABLET ORAL EVERY 4 HOURS PRN
Status: CANCELLED | OUTPATIENT
Start: 2017-12-28

## 2017-12-28 RX ORDER — ONDANSETRON 2 MG/ML
4 INJECTION INTRAMUSCULAR; INTRAVENOUS EVERY 6 HOURS PRN
Status: DISCONTINUED | OUTPATIENT
Start: 2017-12-28 | End: 2017-12-31 | Stop reason: HOSPADM

## 2017-12-28 RX ORDER — DOCUSATE SODIUM 100 MG/1
100 CAPSULE, LIQUID FILLED ORAL 2 TIMES DAILY
Status: CANCELLED | OUTPATIENT
Start: 2017-12-28

## 2017-12-28 RX ORDER — NICOTINE POLACRILEX 4 MG
15 LOZENGE BUCCAL PRN
Status: DISCONTINUED | OUTPATIENT
Start: 2017-12-28 | End: 2017-12-31 | Stop reason: HOSPADM

## 2017-12-28 RX ORDER — 0.9 % SODIUM CHLORIDE 0.9 %
500 INTRAVENOUS SOLUTION INTRAVENOUS ONCE
Status: COMPLETED | OUTPATIENT
Start: 2017-12-28 | End: 2017-12-28

## 2017-12-28 RX ORDER — MAGNESIUM HYDROXIDE 1200 MG/15ML
LIQUID ORAL CONTINUOUS PRN
Status: DISCONTINUED | OUTPATIENT
Start: 2017-12-28 | End: 2017-12-28 | Stop reason: HOSPADM

## 2017-12-28 RX ORDER — PROPOFOL 10 MG/ML
INJECTION, EMULSION INTRAVENOUS CONTINUOUS PRN
Status: DISCONTINUED | OUTPATIENT
Start: 2017-12-28 | End: 2017-12-28 | Stop reason: SDUPTHER

## 2017-12-28 RX ORDER — KETOROLAC TROMETHAMINE 30 MG/ML
30 INJECTION, SOLUTION INTRAMUSCULAR; INTRAVENOUS EVERY 6 HOURS
Status: COMPLETED | OUTPATIENT
Start: 2017-12-28 | End: 2017-12-28

## 2017-12-28 RX ORDER — OXYCODONE HYDROCHLORIDE AND ACETAMINOPHEN 5; 325 MG/1; MG/1
1 TABLET ORAL EVERY 4 HOURS PRN
Status: DISCONTINUED | OUTPATIENT
Start: 2017-12-28 | End: 2017-12-31 | Stop reason: HOSPADM

## 2017-12-28 RX ORDER — SODIUM CHLORIDE, SODIUM LACTATE, POTASSIUM CHLORIDE, CALCIUM CHLORIDE 600; 310; 30; 20 MG/100ML; MG/100ML; MG/100ML; MG/100ML
INJECTION, SOLUTION INTRAVENOUS CONTINUOUS
Status: DISCONTINUED | OUTPATIENT
Start: 2017-12-28 | End: 2017-12-28

## 2017-12-28 RX ORDER — DOCUSATE SODIUM 100 MG/1
100 CAPSULE, LIQUID FILLED ORAL 2 TIMES DAILY
Status: DISCONTINUED | OUTPATIENT
Start: 2017-12-28 | End: 2017-12-31 | Stop reason: HOSPADM

## 2017-12-28 RX ORDER — SENNA AND DOCUSATE SODIUM 50; 8.6 MG/1; MG/1
1 TABLET, FILM COATED ORAL DAILY
Status: DISCONTINUED | OUTPATIENT
Start: 2017-12-28 | End: 2017-12-31 | Stop reason: HOSPADM

## 2017-12-28 RX ORDER — MIDAZOLAM HYDROCHLORIDE 1 MG/ML
INJECTION INTRAMUSCULAR; INTRAVENOUS PRN
Status: DISCONTINUED | OUTPATIENT
Start: 2017-12-28 | End: 2017-12-28 | Stop reason: SDUPTHER

## 2017-12-28 RX ORDER — MORPHINE SULFATE 2 MG/ML
4 INJECTION, SOLUTION INTRAMUSCULAR; INTRAVENOUS
Status: CANCELLED | OUTPATIENT
Start: 2017-12-28

## 2017-12-28 RX ORDER — ONDANSETRON 2 MG/ML
4 INJECTION INTRAMUSCULAR; INTRAVENOUS EVERY 6 HOURS PRN
Status: DISCONTINUED | OUTPATIENT
Start: 2017-12-28 | End: 2017-12-28 | Stop reason: SDUPTHER

## 2017-12-28 RX ORDER — SODIUM CHLORIDE 0.9 % (FLUSH) 0.9 %
10 SYRINGE (ML) INJECTION EVERY 12 HOURS SCHEDULED
Status: DISCONTINUED | OUTPATIENT
Start: 2017-12-28 | End: 2017-12-28 | Stop reason: HOSPADM

## 2017-12-28 RX ORDER — OXYCODONE HYDROCHLORIDE AND ACETAMINOPHEN 5; 325 MG/1; MG/1
2 TABLET ORAL EVERY 4 HOURS PRN
Status: DISCONTINUED | OUTPATIENT
Start: 2017-12-28 | End: 2017-12-31 | Stop reason: HOSPADM

## 2017-12-28 RX ORDER — KETOROLAC TROMETHAMINE 30 MG/ML
30 INJECTION, SOLUTION INTRAMUSCULAR; INTRAVENOUS EVERY 6 HOURS
Status: CANCELLED | OUTPATIENT
Start: 2017-12-28 | End: 2017-12-28

## 2017-12-28 RX ORDER — ONDANSETRON 2 MG/ML
INJECTION INTRAMUSCULAR; INTRAVENOUS PRN
Status: DISCONTINUED | OUTPATIENT
Start: 2017-12-28 | End: 2017-12-28 | Stop reason: SDUPTHER

## 2017-12-28 RX ORDER — ONDANSETRON 2 MG/ML
4 INJECTION INTRAMUSCULAR; INTRAVENOUS EVERY 6 HOURS PRN
Status: CANCELLED | OUTPATIENT
Start: 2017-12-28

## 2017-12-28 RX ORDER — ONDANSETRON 2 MG/ML
4 INJECTION INTRAMUSCULAR; INTRAVENOUS EVERY 6 HOURS PRN
Status: DISCONTINUED | OUTPATIENT
Start: 2017-12-28 | End: 2017-12-28

## 2017-12-28 RX ORDER — SODIUM CHLORIDE 0.9 % (FLUSH) 0.9 %
10 SYRINGE (ML) INJECTION PRN
Status: DISCONTINUED | OUTPATIENT
Start: 2017-12-28 | End: 2017-12-31 | Stop reason: HOSPADM

## 2017-12-28 RX ORDER — ROPIVACAINE HYDROCHLORIDE 5 MG/ML
INJECTION, SOLUTION EPIDURAL; INFILTRATION; PERINEURAL PRN
Status: DISCONTINUED | OUTPATIENT
Start: 2017-12-28 | End: 2017-12-28 | Stop reason: SDUPTHER

## 2017-12-28 RX ORDER — DEXTROSE MONOHYDRATE 50 MG/ML
100 INJECTION, SOLUTION INTRAVENOUS PRN
Status: DISCONTINUED | OUTPATIENT
Start: 2017-12-28 | End: 2017-12-31 | Stop reason: HOSPADM

## 2017-12-28 RX ORDER — PANTOPRAZOLE SODIUM 40 MG/1
40 TABLET, DELAYED RELEASE ORAL
Status: DISCONTINUED | OUTPATIENT
Start: 2017-12-29 | End: 2017-12-31 | Stop reason: HOSPADM

## 2017-12-28 RX ORDER — LEVOTHYROXINE SODIUM 0.1 MG/1
200 TABLET ORAL DAILY
Status: DISCONTINUED | OUTPATIENT
Start: 2017-12-29 | End: 2017-12-31 | Stop reason: HOSPADM

## 2017-12-28 RX ORDER — ONDANSETRON 2 MG/ML
2 INJECTION INTRAMUSCULAR; INTRAVENOUS EVERY 4 HOURS PRN
Status: DISCONTINUED | OUTPATIENT
Start: 2017-12-28 | End: 2017-12-31 | Stop reason: HOSPADM

## 2017-12-28 RX ORDER — SODIUM CHLORIDE 0.9 % (FLUSH) 0.9 %
10 SYRINGE (ML) INJECTION EVERY 12 HOURS SCHEDULED
Status: DISCONTINUED | OUTPATIENT
Start: 2017-12-28 | End: 2017-12-31 | Stop reason: HOSPADM

## 2017-12-28 RX ORDER — DEXTROSE MONOHYDRATE 25 G/50ML
12.5 INJECTION, SOLUTION INTRAVENOUS PRN
Status: DISCONTINUED | OUTPATIENT
Start: 2017-12-28 | End: 2017-12-31 | Stop reason: HOSPADM

## 2017-12-28 RX ORDER — ESTRADIOL 1 MG/1
1.25 TABLET ORAL DAILY
Status: DISCONTINUED | OUTPATIENT
Start: 2017-12-28 | End: 2017-12-31 | Stop reason: HOSPADM

## 2017-12-28 RX ORDER — FENTANYL CITRATE 50 UG/ML
INJECTION, SOLUTION INTRAMUSCULAR; INTRAVENOUS PRN
Status: DISCONTINUED | OUTPATIENT
Start: 2017-12-28 | End: 2017-12-28 | Stop reason: SDUPTHER

## 2017-12-28 RX ORDER — QUETIAPINE FUMARATE 300 MG/1
300 TABLET, FILM COATED ORAL NIGHTLY
Status: DISCONTINUED | OUTPATIENT
Start: 2017-12-28 | End: 2017-12-31 | Stop reason: HOSPADM

## 2017-12-28 RX ORDER — ONDANSETRON 2 MG/ML
2 INJECTION INTRAMUSCULAR; INTRAVENOUS EVERY 4 HOURS PRN
Status: DISCONTINUED | OUTPATIENT
Start: 2017-12-28 | End: 2017-12-28

## 2017-12-28 RX ORDER — TRANEXAMIC ACID 100 MG/ML
INJECTION, SOLUTION INTRAVENOUS PRN
Status: DISCONTINUED | OUTPATIENT
Start: 2017-12-28 | End: 2017-12-28 | Stop reason: HOSPADM

## 2017-12-28 RX ORDER — LIDOCAINE HYDROCHLORIDE 10 MG/ML
1 INJECTION, SOLUTION EPIDURAL; INFILTRATION; INTRACAUDAL; PERINEURAL
Status: COMPLETED | OUTPATIENT
Start: 2017-12-28 | End: 2017-12-28

## 2017-12-28 RX ORDER — BISACODYL 10 MG
10 SUPPOSITORY, RECTAL RECTAL DAILY PRN
Status: CANCELLED | OUTPATIENT
Start: 2017-12-28

## 2017-12-28 RX ORDER — SODIUM CHLORIDE 9 MG/ML
INJECTION, SOLUTION INTRAVENOUS CONTINUOUS
Status: CANCELLED | OUTPATIENT
Start: 2017-12-28

## 2017-12-28 RX ORDER — MORPHINE SULFATE 2 MG/ML
2 INJECTION, SOLUTION INTRAMUSCULAR; INTRAVENOUS
Status: CANCELLED | OUTPATIENT
Start: 2017-12-28

## 2017-12-28 RX ORDER — ONDANSETRON 2 MG/ML
2 INJECTION INTRAMUSCULAR; INTRAVENOUS EVERY 4 HOURS
Status: DISCONTINUED | OUTPATIENT
Start: 2017-12-28 | End: 2017-12-28

## 2017-12-28 RX ORDER — ACETAMINOPHEN 325 MG/1
650 TABLET ORAL EVERY 4 HOURS PRN
Status: CANCELLED | OUTPATIENT
Start: 2017-12-28

## 2017-12-28 RX ORDER — MORPHINE SULFATE 2 MG/ML
2 INJECTION, SOLUTION INTRAMUSCULAR; INTRAVENOUS
Status: DISCONTINUED | OUTPATIENT
Start: 2017-12-28 | End: 2017-12-31 | Stop reason: HOSPADM

## 2017-12-28 RX ORDER — ASPIRIN 81 MG/1
81 TABLET ORAL 2 TIMES DAILY
Status: DISCONTINUED | OUTPATIENT
Start: 2017-12-29 | End: 2017-12-31 | Stop reason: HOSPADM

## 2017-12-28 RX ORDER — SODIUM CHLORIDE 0.9 % (FLUSH) 0.9 %
10 SYRINGE (ML) INJECTION EVERY 12 HOURS SCHEDULED
Status: CANCELLED | OUTPATIENT
Start: 2017-12-28

## 2017-12-28 RX ORDER — BISACODYL 10 MG
10 SUPPOSITORY, RECTAL RECTAL DAILY PRN
Status: DISCONTINUED | OUTPATIENT
Start: 2017-12-28 | End: 2017-12-31 | Stop reason: HOSPADM

## 2017-12-28 RX ORDER — BUPROPION HYDROCHLORIDE 75 MG/1
75 TABLET ORAL DAILY
Status: DISCONTINUED | OUTPATIENT
Start: 2017-12-28 | End: 2017-12-31 | Stop reason: HOSPADM

## 2017-12-28 RX ORDER — SENNA AND DOCUSATE SODIUM 50; 8.6 MG/1; MG/1
1 TABLET, FILM COATED ORAL DAILY
Status: CANCELLED | OUTPATIENT
Start: 2017-12-28

## 2017-12-28 RX ORDER — EPHEDRINE SULFATE 50 MG/ML
INJECTION, SOLUTION INTRAVENOUS PRN
Status: DISCONTINUED | OUTPATIENT
Start: 2017-12-28 | End: 2017-12-28 | Stop reason: SDUPTHER

## 2017-12-28 RX ORDER — MORPHINE SULFATE 4 MG/ML
4 INJECTION, SOLUTION INTRAMUSCULAR; INTRAVENOUS
Status: DISCONTINUED | OUTPATIENT
Start: 2017-12-28 | End: 2017-12-31 | Stop reason: HOSPADM

## 2017-12-28 RX ORDER — OXYCODONE HYDROCHLORIDE AND ACETAMINOPHEN 5; 325 MG/1; MG/1
2 TABLET ORAL EVERY 4 HOURS PRN
Status: CANCELLED | OUTPATIENT
Start: 2017-12-28

## 2017-12-28 RX ORDER — SODIUM CHLORIDE, SODIUM LACTATE, POTASSIUM CHLORIDE, CALCIUM CHLORIDE 600; 310; 30; 20 MG/100ML; MG/100ML; MG/100ML; MG/100ML
INJECTION, SOLUTION INTRAVENOUS
Status: DISCONTINUED
Start: 2017-12-28 | End: 2017-12-28

## 2017-12-28 RX ORDER — SODIUM CHLORIDE 9 MG/ML
INJECTION, SOLUTION INTRAVENOUS CONTINUOUS
Status: DISCONTINUED | OUTPATIENT
Start: 2017-12-28 | End: 2017-12-30

## 2017-12-28 RX ORDER — SODIUM CHLORIDE 0.9 % (FLUSH) 0.9 %
10 SYRINGE (ML) INJECTION PRN
Status: CANCELLED | OUTPATIENT
Start: 2017-12-28

## 2017-12-28 RX ADMIN — SODIUM CHLORIDE, POTASSIUM CHLORIDE, SODIUM LACTATE AND CALCIUM CHLORIDE: 600; 310; 30; 20 INJECTION, SOLUTION INTRAVENOUS at 06:41

## 2017-12-28 RX ADMIN — ROPIVACAINE HYDROCHLORIDE 20 ML: 5 INJECTION, SOLUTION EPIDURAL; INFILTRATION; PERINEURAL at 07:20

## 2017-12-28 RX ADMIN — CEFAZOLIN SODIUM 2 G: 2 SOLUTION INTRAVENOUS at 07:38

## 2017-12-28 RX ADMIN — Medication 2 MG: at 21:41

## 2017-12-28 RX ADMIN — LIDOCAINE HYDROCHLORIDE 1 ML: 10 INJECTION, SOLUTION EPIDURAL; INFILTRATION; INTRACAUDAL; PERINEURAL at 06:41

## 2017-12-28 RX ADMIN — SODIUM CHLORIDE: 9 INJECTION, SOLUTION INTRAVENOUS at 10:48

## 2017-12-28 RX ADMIN — EPHEDRINE SULFATE 5 MG: 50 INJECTION, SOLUTION INTRAMUSCULAR; INTRAVENOUS; SUBCUTANEOUS at 08:29

## 2017-12-28 RX ADMIN — Medication 2 MG: at 17:10

## 2017-12-28 RX ADMIN — Medication 2 PUFF: at 20:22

## 2017-12-28 RX ADMIN — EPHEDRINE SULFATE 5 MG: 50 INJECTION, SOLUTION INTRAMUSCULAR; INTRAVENOUS; SUBCUTANEOUS at 08:03

## 2017-12-28 RX ADMIN — SENNOSIDES AND DOCUSATE SODIUM 1 TABLET: 8.6; 5 TABLET ORAL at 21:40

## 2017-12-28 RX ADMIN — PROPOFOL 75 MCG/KG/MIN: 10 INJECTION, EMULSION INTRAVENOUS at 07:44

## 2017-12-28 RX ADMIN — ONDANSETRON 2 MG: 2 INJECTION INTRAMUSCULAR; INTRAVENOUS at 21:41

## 2017-12-28 RX ADMIN — QUETIAPINE FUMARATE 300 MG: 300 TABLET ORAL at 21:41

## 2017-12-28 RX ADMIN — ONDANSETRON 2 MG: 2 INJECTION INTRAMUSCULAR; INTRAVENOUS at 17:10

## 2017-12-28 RX ADMIN — CEFAZOLIN SODIUM 2 G: 2 SOLUTION INTRAVENOUS at 15:06

## 2017-12-28 RX ADMIN — KETOROLAC TROMETHAMINE 30 MG: 30 INJECTION, SOLUTION INTRAMUSCULAR at 10:48

## 2017-12-28 RX ADMIN — KETOROLAC TROMETHAMINE 30 MG: 30 INJECTION, SOLUTION INTRAMUSCULAR at 17:10

## 2017-12-28 RX ADMIN — FENTANYL CITRATE 100 MCG: 50 INJECTION, SOLUTION INTRAMUSCULAR; INTRAVENOUS at 07:10

## 2017-12-28 RX ADMIN — DOCUSATE SODIUM 100 MG: 100 CAPSULE, LIQUID FILLED ORAL at 21:40

## 2017-12-28 RX ADMIN — SODIUM CHLORIDE, POTASSIUM CHLORIDE, SODIUM LACTATE AND CALCIUM CHLORIDE: 600; 310; 30; 20 INJECTION, SOLUTION INTRAVENOUS at 07:50

## 2017-12-28 RX ADMIN — SODIUM CHLORIDE 500 ML: 9 INJECTION, SOLUTION INTRAVENOUS at 12:12

## 2017-12-28 RX ADMIN — EPHEDRINE SULFATE 5 MG: 50 INJECTION, SOLUTION INTRAMUSCULAR; INTRAVENOUS; SUBCUTANEOUS at 08:17

## 2017-12-28 RX ADMIN — PROCHLORPERAZINE EDISYLATE 10 MG: 5 INJECTION INTRAMUSCULAR; INTRAVENOUS at 16:36

## 2017-12-28 RX ADMIN — MIDAZOLAM HYDROCHLORIDE 2 MG: 1 INJECTION, SOLUTION INTRAMUSCULAR; INTRAVENOUS at 07:10

## 2017-12-28 RX ADMIN — ONDANSETRON 4 MG: 2 INJECTION INTRAMUSCULAR; INTRAVENOUS at 08:27

## 2017-12-28 RX ADMIN — ONDANSETRON 4 MG: 2 INJECTION INTRAMUSCULAR; INTRAVENOUS at 13:11

## 2017-12-28 RX ADMIN — SODIUM CHLORIDE: 9 INJECTION, SOLUTION INTRAVENOUS at 18:14

## 2017-12-28 ASSESSMENT — PULMONARY FUNCTION TESTS
PIF_VALUE: 1
PIF_VALUE: 0
PIF_VALUE: 1
PIF_VALUE: 0
PIF_VALUE: 1
PIF_VALUE: 0
PIF_VALUE: 1
PIF_VALUE: 0
PIF_VALUE: 1
PIF_VALUE: 1
PIF_VALUE: 0
PIF_VALUE: 1
PIF_VALUE: 1
PIF_VALUE: 0
PIF_VALUE: 1
PIF_VALUE: 0
PIF_VALUE: 1
PIF_VALUE: 0
PIF_VALUE: 1

## 2017-12-28 ASSESSMENT — PAIN SCALES - GENERAL
PAINLEVEL_OUTOF10: 6
PAINLEVEL_OUTOF10: 0
PAINLEVEL_OUTOF10: 2
PAINLEVEL_OUTOF10: 8
PAINLEVEL_OUTOF10: 2

## 2017-12-28 ASSESSMENT — COPD QUESTIONNAIRES: CAT_SEVERITY: NO INTERVAL CHANGE

## 2017-12-28 ASSESSMENT — PAIN - FUNCTIONAL ASSESSMENT: PAIN_FUNCTIONAL_ASSESSMENT: 0-10

## 2017-12-28 NOTE — PROGRESS NOTES
Physical Therapy   Facility/Department: [facility] MED SURG N224/N224-01    NAME: Woo Curtis    : 1970 (50 y.o.)  MRN: [id number]    Account: [de-identified]  Gender: female    PT evaluation and treatment orders received. Chart reviewed. PT eval attempted. HOLD PT eval per RN request. Pt experiencing N & V, low BP. Will attempt PT evaluation again at earliest convenience.         Electronically signed by [employer] PT on 17 at 1:33 PM

## 2017-12-28 NOTE — PROGRESS NOTES
[facility] Occupational Therapy      Date: 2017  Patient Name: Judith Sanz        MRN: [id number]  Account: [de-identified]   : 1970  (50 y.o.)  Room: [place] [address]    Chart reviewed, attempted OT tx at 1:20 PM, but pt. unavailable 2° to:    [x] Hold per nsg request due to nausea and vomiting, low BP and pt receiving bolus     [] Pt declined     [] Pt. . off floor for test/procedure. Will attempt again when able.     Electronically signed by Carole Simon OT on 2017 at 3:00 PM

## 2017-12-28 NOTE — PROGRESS NOTES
[id number]  Dr. Aden Escoto started nerve block and spinal  0726 AM Spinal and nerve block completed patient tolerated procedure well, no complaints

## 2017-12-28 NOTE — ANESTHESIA PRE PROCEDURE
Department of Anesthesiology  Preprocedure Note       Name:  Elisa Oro   Age:  50 y.o.  :  1970                                          MRN:  [id number]         Date:  2017      Surgeon: Fernande Boas): Navarro Alejandra MD    Procedure: Procedure(s):  LEFT  KNEE TOTAL KNEE ARTHROPLASTY    Medications prior to admission:   Prior to Admission medications    Medication Sig Start Date End Date Taking? Authorizing Provider   QUEtiapine (SEROQUEL) 300 MG tablet Take 300 mg by mouth daily   Yes Historical Provider, MD   UNKNOWN TO PATIENT Take 1 tablet by mouth nightly Bipolar medicine   Yes Historical Provider, MD   fluticasone-vilanterol (BREO ELLIPTA) 100-25 MCG/INH AEPB inhaler Inhale 1 puff into the lungs daily   Yes Historical Provider, MD   ipratropium-albuterol (DUONEB) 0.5-2.5 (3) MG/3ML SOLN nebulizer solution Take 1 vial by nebulization every 4 hours   Yes Historical Provider, MD   metFORMIN (GLUCOPHAGE) 500 MG tablet Take 500 mg by mouth 3 times daily   Yes Historical Provider, MD   levothyroxine (SYNTHROID) 300 MCG tablet Take 200 mcg by mouth Daily 12 15 17 will begin  225 mcg daily   Yes Historical Provider, MD   buPROPion (WELLBUTRIN) 75 MG tablet Take 75 mg by mouth daily   Yes Historical Provider, MD   ibuprofen (ADVIL;MOTRIN) 800 MG tablet Take 800 mg by mouth every 6 hours as needed for Pain   Yes Historical Provider, MD   omeprazole (PRILOSEC) 20 MG capsule Take 20 mg by mouth daily. Yes Historical Provider, MD   estrogens conjugated, synthetic A, (CENESTIN) 1.25 MG tablet Take 1 mg by mouth daily.    Yes Historical Provider, MD       Current medications:    Current Facility-Administered Medications   Medication Dose Route Frequency Provider Last Rate Last Dose    lactated ringers infusion             lactated ringers infusion   Intravenous Continuous Yue Ring,  mL/hr at 17 0641      sodium chloride flush 0.9 % injection 10 mL  10 mL Intravenous 2 times per 4.4 06/02/2017     06/02/2017    CO2 25 06/02/2017    BUN 19 06/02/2017    CREATININE 0.82 06/02/2017    GFRAA >60.0 06/02/2017    LABGLOM >60.0 06/02/2017    GLUCOSE 104 06/02/2017    PROT 7.0 07/12/2016    CALCIUM 9.2 06/02/2017    BILITOT 0.67 07/12/2016    ALKPHOS 116 07/12/2016    AST 34 07/12/2016    ALT 40 07/12/2016       POC Tests:   Recent Labs      12/28/17   0645   POCGLU  120*       Coags: No results found for: PROTIME, INR, APTT    HCG (If Applicable): No results found for: PREGTESTUR, PREGSERUM, HCG, HCGQUANT     ABGs: No results found for: PHART, PO2ART, FVL1RYO, FMI2SWH, BEART, Q1AXVJSZ     Type & Screen (If Applicable):  No results found for: LABABO, [address]    Anesthesia Evaluation  Patient summary reviewed and Nursing notes reviewed   history of anesthetic complications: PONV. Airway: Mallampati: II  TM distance: >3 FB   Neck ROM: full  Mouth opening: > = 3 FB Dental: normal exam         Pulmonary:normal exam    (+) COPD: no interval change,  sleep apnea: on noncompliant,  asthma: seasonal asthma,                            Cardiovascular:Negative CV ROS                      Neuro/Psych:   Negative Neuro/Psych ROS              GI/Hepatic/Renal: Neg GI/Hepatic/Renal ROS  (+) morbid obesity          Endo/Other:    (+) hypothyroidism::., .                 Abdominal:   (+) obese,         Vascular: negative vascular ROS. Anesthesia Plan      spinal     ASA 3           MIPS: Prophylactic antiemetics administered. Anesthetic plan and risks discussed with patient. Plan discussed with CRNA.     Attending anesthesiologist reviewed and agrees with Pre Eval content              Joey Waller MD   12/28/2017

## 2017-12-28 NOTE — ANESTHESIA PROCEDURE NOTES
Peripheral Block    Patient location during procedure: pre-op  Staffing  Anesthesiologist: Latanya Ellis  Performed: anesthesiologist   Preanesthetic Checklist  Completed: patient identified, site marked, surgical consent, pre-op evaluation, timeout performed, IV checked, risks and benefits discussed, monitors and equipment checked, anesthesia consent given, oxygen available and patient being monitored  Peripheral Block  Patient position: supine  Prep: ChloraPrep  Patient monitoring: cardiac monitor, continuous pulse ox, frequent blood pressure checks and IV access  Block type: Saphenous  Laterality: left  Injection technique: single-shot  Procedures: ultrasound guided  Local infiltration: ropivacaine  Infiltration strength: 0.5 %  Dose: 20 mL  Provider prep: mask and sterile gloves  Local infiltration: ropivacaine  Needle  Needle type: combined needle/nerve stimulator   Needle gauge: 21 G  Needle length: 10 cm  Needle localization: ultrasound guidance  Assessment  Injection assessment: negative aspiration for heme, no paresthesia on injection and local visualized surrounding nerve on ultrasound  Paresthesia pain: none  Slow fractionated injection: yes  Hemodynamics: stable  Reason for block: post-op pain management

## 2017-12-28 NOTE — BRIEF OP NOTE
Brief Postoperative Note  ______________________________________________________________    Patient: Pola Gil  YOB: 1970  MRN: [id number]  Date of Procedure: 12/28/2017    Pre-Op Diagnosis: LEFT  KNEE OSTEOARTHRITIS    Post-Op Diagnosis: Same       Procedure(s):  LEFT  KNEE TOTAL KNEE ARTHROPLASTY    Anesthesia: Anesthesia type not filed in the log. Surgeon(s):   Marsa Duane, MD    Staff:  Scrub Person First: John Grajeda  Physician Assistant: CANDE Agrawal     Estimated Blood Loss: * No values recorded between 12/28/2017  7:37 AM and 12/28/2017  5:97 AM * mL    Complications: None    Specimens:   ID Type Source Tests Collected by Time Destination   1 : urine for UA C&S Body Fluid Bladder URINE CULTURE, URINALYSIS Marsa Duane, MD 12/28/2017 4718        Implants:  * No implants in log *      Drains:      Findings: djd    Marsa Duane, MD  Date: 12/28/2017  Time: 8:39 AM

## 2017-12-28 NOTE — ANESTHESIA PROCEDURE NOTES
Spinal Block    Patient location during procedure: pre-op  Reason for block: primary anesthetic  Staffing  Anesthesiologist: Sav Hernandez  Performed: anesthesiologist   Preanesthetic Checklist  Completed: patient identified, site marked, surgical consent, pre-op evaluation, timeout performed, IV checked, risks and benefits discussed, monitors and equipment checked, anesthesia consent given, oxygen available and patient being monitored  Spinal Block  Patient position: sitting  Prep: Betadine  Patient monitoring: cardiac monitor, continuous pulse ox and frequent blood pressure checks  Approach: midline  Location: L4/L5  Provider prep: mask, sterile gloves and sterile gown  Local infiltration: lidocaine  Agent: bupivacaine  Dose: 2  Dose: 2  Needle  Needle type:  [place]   Needle gauge: 25 G  Assessment  Events: cerebrospinal fluid  Swirl obtained: Yes  CSF: clear  Hemodynamics: stable

## 2017-12-28 NOTE — CONSULTS
Negative Negative mg/dL    Bilirubin Urine Negative Negative    Ketones, Urine Negative Negative mg/dL    Specific Gravity, UA 1.020 1.005 - 1.030    Blood, Urine TRACE (A) Negative    pH, UA 5.0 5.0 - 9.0    Protein, UA Negative Negative mg/dL    Urobilinogen, Urine 0.2 <2.0 E.U./dL    Nitrite, Urine Negative Negative    Leukocyte Esterase, Urine Negative Negative   POCT Glucose    Collection Time: 12/28/17  9:24 AM   Result Value Ref Range    POC Glucose 101 60 - 115 mg/dl    Performed on ACCU-CHEK      Assessment/Plan:  1. Left knee OA s/p left total knee arthroplasty: continue with ortho plan of care managed by Dr. Frances Crane CNP, PT/OT. Pain management  2. PONV: IV zofran prn, fluid bolus x1, IV NS continuous until nausea minimizes  3. Type II DM: POCT glucose, eval and treat with SSI, metformin  4. COPD  5. Thyroid disease  6.  Depression/bipolar    Electronically signed by Tami Pandya CNP on 12/28/17 at 11:31 AM

## 2017-12-28 NOTE — ANESTHESIA POSTPROCEDURE EVALUATION
Department of Anesthesiology  Postprocedure Note    Patient: Jordin Kennedy  MRN: [id number]  YOB: 1970  Date of evaluation: 12/28/2017  Time:  9:07 AM     Procedure Summary     Date:  12/28/17 Room / Location:  [place] [address] / [facility] OR    Anesthesia Start:  7062 Anesthesia Stop:  0066    Procedure:  LEFT  KNEE TOTAL KNEE ARTHROPLASTY (Left ) Diagnosis:  (LEFT  KNEE OSTEOARTHRITIS)    Surgeon:  Jazmine Otero MD Responsible Provider:  Teri Ruiz MD    Anesthesia Type:  spinal ASA Status:  3          Anesthesia Type: No value filed. Abiodun Phase I: Abiodun Score: 7    Abiodun Phase II:      Last vitals: Reviewed and per EMR flowsheets.        Anesthesia Post Evaluation    Patient location during evaluation: PACU  Patient participation: complete - patient participated  Level of consciousness: awake and alert  Pain score: 1  Airway patency: patent  Nausea & Vomiting: no nausea and no vomiting  Complications: no  Cardiovascular status: hemodynamically stable  Respiratory status: acceptable and face mask  Hydration status: euvolemic  Comments: Report to RN, normal sinus rhythm

## 2017-12-29 LAB
ANION GAP SERPL CALCULATED.3IONS-SCNC: 15 MEQ/L (ref 7–13)
BUN BLDV-MCNC: 17 MG/DL (ref 6–20)
CALCIUM SERPL-MCNC: 8.9 MG/DL (ref 8.6–10.2)
CHLORIDE BLD-SCNC: 101 MEQ/L (ref 98–107)
CO2: 26 MEQ/L (ref 22–29)
CREAT SERPL-MCNC: 0.73 MG/DL (ref 0.5–0.9)
GFR AFRICAN AMERICAN: >60
GFR NON-AFRICAN AMERICAN: >60
GLUCOSE BLD-MCNC: 104 MG/DL (ref 74–109)
GLUCOSE BLD-MCNC: 118 MG/DL (ref 60–115)
GLUCOSE BLD-MCNC: 132 MG/DL (ref 60–115)
GLUCOSE BLD-MCNC: 141 MG/DL (ref 60–115)
HCT VFR BLD CALC: 40.3 % (ref 37–47)
HEMOGLOBIN: 13.6 G/DL (ref 12–16)
MCH RBC QN AUTO: 30.1 PG (ref 27–31.3)
MCHC RBC AUTO-ENTMCNC: 33.7 % (ref 33–37)
MCV RBC AUTO: 89.4 FL (ref 82–100)
PDW BLD-RTO: 13.4 % (ref 11.5–14.5)
PERFORMED ON: ABNORMAL
PLATELET # BLD: 241 K/UL (ref 130–400)
POTASSIUM SERPL-SCNC: 4.5 MEQ/L (ref 3.5–5.1)
RBC # BLD: 4.51 M/UL (ref 4.2–5.4)
SODIUM BLD-SCNC: 142 MEQ/L (ref 132–144)
WBC # BLD: 11.5 K/UL (ref 4.8–10.8)

## 2017-12-29 PROCEDURE — G8988 SELF CARE GOAL STATUS: HCPCS

## 2017-12-29 PROCEDURE — 6370000000 HC RX 637 (ALT 250 FOR IP): Performed by: NURSE PRACTITIONER

## 2017-12-29 PROCEDURE — 94760 N-INVAS EAR/PLS OXIMETRY 1: CPT

## 2017-12-29 PROCEDURE — 97116 GAIT TRAINING THERAPY: CPT

## 2017-12-29 PROCEDURE — G8978 MOBILITY CURRENT STATUS: HCPCS

## 2017-12-29 PROCEDURE — 1210000000 HC MED SURG R&B

## 2017-12-29 PROCEDURE — 2580000003 HC RX 258: Performed by: NURSE PRACTITIONER

## 2017-12-29 PROCEDURE — 6360000002 HC RX W HCPCS: Performed by: ORTHOPAEDIC SURGERY

## 2017-12-29 PROCEDURE — 94640 AIRWAY INHALATION TREATMENT: CPT

## 2017-12-29 PROCEDURE — G8987 SELF CARE CURRENT STATUS: HCPCS

## 2017-12-29 PROCEDURE — 97535 SELF CARE MNGMENT TRAINING: CPT

## 2017-12-29 PROCEDURE — 2580000003 HC RX 258: Performed by: ORTHOPAEDIC SURGERY

## 2017-12-29 PROCEDURE — 85027 COMPLETE CBC AUTOMATED: CPT

## 2017-12-29 PROCEDURE — 6370000000 HC RX 637 (ALT 250 FOR IP): Performed by: ORTHOPAEDIC SURGERY

## 2017-12-29 PROCEDURE — 97163 PT EVAL HIGH COMPLEX 45 MIN: CPT

## 2017-12-29 PROCEDURE — 97167 OT EVAL HIGH COMPLEX 60 MIN: CPT

## 2017-12-29 PROCEDURE — G8979 MOBILITY GOAL STATUS: HCPCS

## 2017-12-29 PROCEDURE — 36415 COLL VENOUS BLD VENIPUNCTURE: CPT

## 2017-12-29 PROCEDURE — 80048 BASIC METABOLIC PNL TOTAL CA: CPT

## 2017-12-29 PROCEDURE — 6360000002 HC RX W HCPCS: Performed by: NURSE PRACTITIONER

## 2017-12-29 RX ADMIN — ONDANSETRON 4 MG: 2 INJECTION INTRAMUSCULAR; INTRAVENOUS at 23:57

## 2017-12-29 RX ADMIN — QUETIAPINE FUMARATE 300 MG: 300 TABLET ORAL at 23:58

## 2017-12-29 RX ADMIN — Medication 2 PUFF: at 07:17

## 2017-12-29 RX ADMIN — OXYCODONE HYDROCHLORIDE AND ACETAMINOPHEN 2 TABLET: 5; 325 TABLET ORAL at 23:58

## 2017-12-29 RX ADMIN — OXYCODONE HYDROCHLORIDE AND ACETAMINOPHEN 2 TABLET: 5; 325 TABLET ORAL at 17:55

## 2017-12-29 RX ADMIN — SODIUM CHLORIDE: 9 INJECTION, SOLUTION INTRAVENOUS at 23:59

## 2017-12-29 RX ADMIN — ESTRADIOL 1.25 MG: 1 TABLET ORAL at 09:50

## 2017-12-29 RX ADMIN — ASPIRIN 81 MG: 81 TABLET, COATED ORAL at 23:58

## 2017-12-29 RX ADMIN — LEVOTHYROXINE SODIUM 200 MCG: 100 TABLET ORAL at 05:03

## 2017-12-29 RX ADMIN — SENNOSIDES AND DOCUSATE SODIUM 1 TABLET: 8.6; 5 TABLET ORAL at 23:58

## 2017-12-29 RX ADMIN — SODIUM CHLORIDE, PRESERVATIVE FREE 10 ML: 5 INJECTION INTRAVENOUS at 23:59

## 2017-12-29 RX ADMIN — METFORMIN HYDROCHLORIDE 500 MG: 500 TABLET ORAL at 23:58

## 2017-12-29 RX ADMIN — PANTOPRAZOLE SODIUM 40 MG: 40 TABLET, DELAYED RELEASE ORAL at 05:03

## 2017-12-29 RX ADMIN — Medication 2 PUFF: at 19:50

## 2017-12-29 RX ADMIN — DOCUSATE SODIUM 100 MG: 100 CAPSULE, LIQUID FILLED ORAL at 23:58

## 2017-12-29 RX ADMIN — METFORMIN HYDROCHLORIDE 500 MG: 500 TABLET ORAL at 17:55

## 2017-12-29 RX ADMIN — DOCUSATE SODIUM 100 MG: 100 CAPSULE, LIQUID FILLED ORAL at 09:51

## 2017-12-29 RX ADMIN — CEFAZOLIN SODIUM 2 G: 2 SOLUTION INTRAVENOUS at 00:00

## 2017-12-29 RX ADMIN — OXYCODONE HYDROCHLORIDE AND ACETAMINOPHEN 2 TABLET: 5; 325 TABLET ORAL at 09:50

## 2017-12-29 RX ADMIN — BUPROPION HYDROCHLORIDE 75 MG: 75 TABLET, FILM COATED ORAL at 09:51

## 2017-12-29 RX ADMIN — METFORMIN HYDROCHLORIDE 500 MG: 500 TABLET ORAL at 09:51

## 2017-12-29 RX ADMIN — ASPIRIN 81 MG: 81 TABLET, COATED ORAL at 09:51

## 2017-12-29 RX ADMIN — Medication 2 MG: at 02:44

## 2017-12-29 RX ADMIN — OXYCODONE HYDROCHLORIDE AND ACETAMINOPHEN 2 TABLET: 5; 325 TABLET ORAL at 05:03

## 2017-12-29 ASSESSMENT — PAIN SCALES - GENERAL
PAINLEVEL_OUTOF10: 8
PAINLEVEL_OUTOF10: 4
PAINLEVEL_OUTOF10: 8
PAINLEVEL_OUTOF10: 5
PAINLEVEL_OUTOF10: 5
PAINLEVEL_OUTOF10: 8

## 2017-12-29 ASSESSMENT — PAIN DESCRIPTION - DESCRIPTORS: DESCRIPTORS: SHARP

## 2017-12-29 ASSESSMENT — PAIN DESCRIPTION - PAIN TYPE: TYPE: ACUTE PAIN;SURGICAL PAIN

## 2017-12-29 ASSESSMENT — PAIN DESCRIPTION - ORIENTATION: ORIENTATION: LEFT

## 2017-12-29 ASSESSMENT — PAIN DESCRIPTION - LOCATION: LOCATION: KNEE

## 2017-12-29 NOTE — CARE COORDINATION
No precert as of end of day today but the admission coordinator will try to get it tomorrow if the insurance is open. She will call if approval is received.

## 2017-12-29 NOTE — PROGRESS NOTES
Physical Therapy  Physical Therapy Med Surg Initial Assessment  Facility/Department: [facility] NEURO  Room: N224/N224-01       NAME: Aníbal Mane  : 1970 (35 y.o.)  MRN: [id number]  CODE STATUS: Full Code    Date of Service: 2017    Patient Diagnosis(es): Status post total left knee replacement [Z96.652]   No chief complaint on file.     Patient Active Problem List    Diagnosis Date Noted    Status post total left knee replacement 2017    Osteoarthritis of left knee 2017    Hypothyroidism (acquired) 2014    Multinodular goiter (nontoxic) 2014        Past Medical History:   Diagnosis Date    Arthritis     Asthma     [facility]     COPD [facility]ronic obstructive [facility] disease[facility]     Diabetes mellitus ([facility]     Hyperlipidemia     diet /exercise management    JOSEPH on CPAP     PONV (postoperative nausea and vomiting)     Sleep apnea     Thyroid disease      Past Surgical History:   Procedure Laterality Date    ACHILLES TENDON SURGERY Bilateral     APPENDECTOMY      BREAST SURGERY       SECTION      x2    CHOLECYSTECTOMY      HERNIA REPAIR      HYSTERECTOMY      KNEE SURGERY Left     meniscus    OTHER SURGICAL HISTORY      scar tissue removal from left foot     [facility] KNEE SCOPE,DIAGNOSTIC Left 3/2/2017    LEFT KNEE ARTHROSCOPIC PARTIAL MEDIAL MENISCECTOMY , LATEX ALLERGY  performed by Fadi Starr MD at [facility] Right 2017    RIGHT KNEE ARTHROSCOPY CHONDROPLASTY, LATEX ALLERGY  performed by Fadi Starr MD at [address] [place] [address]. Left 2017    LEFT  KNEE TOTAL KNEE ARTHROPLASTY [facility] B[facility]        Chart Reviewed: Yes  Family / Caregiver Present: No    Restrictions:  Restrictions/Precautions: Weight Bearing  Lower Extremity Weight Bearing Restrictions  Left Lower Extremity Weight Bearing: Weight Bearing As Tolerated  Position Activity Restriction  Other position/activity restrictions: knee immobilizer on for gait and transfers until pt able to SLR  Body mass index is 43.6 kg/m².      SUBJECTIVE:    Pre Treatment Pain Screening  Pain at present: 8  Intervention List: Nurse/physician notified  Comments / Details: left knee    Post Treatment Pain Screening:   Pain Assessment  Pain Level: 8    Prior Level of Function:  Social/Functional History  Lives With: Spouse (and son )  Type of Home: House  Home Layout: Two level, Bed/Bath upstairs (moving bedroom to first level and will need BSC)  Home Access: Stairs to enter without rails (plans to have rails installed)  Entrance Stairs - Number of Steps: 2 to enter and 16 to second level  Home Equipment: 4 wheeled walker, Standard walker (has a wedge for the bed)  ADL Assistance: Independent  Ambulation Assistance: Independent (with walker short distances on second level - used w/c primarily on main level)  Transfer Assistance: Independent    OBJECTIVE:   Vision/Hearing:  Vision: Impaired  Vision Exceptions: Wears glasses for reading  Hearing: Within functional limits    Cognition:  Overall Orientation Status: Within Functional Limits  Follows Commands: Within Functional Limits         ROM:  RLE PROM: WFL  LLE PROM: WFL  LLE General PROM: except 5 -  25 degrees kneee ROM    Strength:  Strength RLE  Strength RLE: WFL  Strength LLE  Comment: unable to SLR; 2/5 hip, 3+/5 df    Neuro:  Balance  Posture: Fair  Sitting - Static: Poor (unable to sit EOB without UE support)  Sitting - Dynamic:  (unable to reach in sitting - unable to scoot without assistance)  Standing - Static: Poor;+ (required BUE support and max assist to maintain standing)             Bed mobility  Supine to Sit: Minimal assistance  Sit to Supine: Minimal assistance  Comment: HOB eleveated due to concerns for respiratory status    Transfers  Sit to Stand: Maximum Assistance  Stand to sit: Maximum Assistance  Comment: around  Mobility: Walking and Moving Around Current Status (): 100 percent impaired, limited or restricted  Mobility: Walking and Moving Around Goal Status ():  At least 1 percent but less than 20 percent impaired, limited or restricted    Goals:  Short term goals  Short term goal 1: indep bed mobility  Short term goal 2: min assist transfers with ww  Short term goal 3: min assist  giait with appropriate device 50 feet  Short term goal 4: min assist stairs  Long term goals  Long term goal 1: indep transfers  Long term goal 2: indep gait 50 feet  Long term goal 3: indep flight of stairs with 1 rail  Long term goal 4: good left knee strength to eliminate need for immobilizer    Therapy Time:   Individual   Time In 0845   Time Out 0915   Minutes Dayday Donovan [address] [place] [address], 12/29/17 at 9:26 AM

## 2017-12-29 NOTE — PROGRESS NOTES
spouse   [] Other   Comment: son   Home: [] Single level   [x]  Two level   []  Split level     []  Apartment:     Entrance:  Stairs: 2 Hand rails none,    Inside: Stairs: 16 Hand rails: one side  Bathroom: [] Bath tub   [x] Tub/Shower combo   []  Shower stall    Location:     DME: [x] standard walker   [] Cane   [x] Rollator   [x]  W/C   [] Grab Bars   [x] Shower Chair   [] BSC  [] Dressing  AE  [] Other:      Previous Functional Status: Pt reported IND with ADL's with increased time PTA. PRN assistance for ambulation to walk up stairs, pt reported using w/c on main floor IND'ly.  works. Pt reported moving bed room downstairs, no downstairs bathroom, trying to obtain [facility] and medical bed.       Pain:   Start of session: 8/10  Description: hurts, sharp   Location: left knee   End of session: 8/10  Action: [] No Action Necessary    [x] Patient reports pain at acceptable level for treatment  [x] Nursing notified    [] Other      Objective:  Observation:  Pt lying in bed with IV in right arm, donning NC at 2.5 L     Orientation: Oriented to  [x] Person   [x] Place  [x]Time    Vision:   []  WFL   [x] Impaired  Comments: glasses       Hearing:  [x] WFL   [] Impaired  Comments:    Sensation:   [] WFL   [x]  Impaired   Comments: neuropathy BUE and BLE      Cognition:   [x] WFL   [] Impaired  Comments:    Communication:   [x] WFL   [] Impaired  Comments:    Hand Dominance: [] Right   [x] Left    Range of Motion:  R UE AROM/PROM: [x]  WFL [] Impaired  Comments:   L UE AROM/PROM:  [x]  WFL [] Impaired  Comments:     Strength:   R UE Strength: []1    [] 2   [] 2+   [] 3   [x] 3+   [] 4   [] 4+  [] 5  Comments:   L UE Strength:  []1    [] 2   [] 2+   [] 3   [x] 3+  [] 4   [] 4+   [] 5  Comments:     Quality of Movement:  [x] Good -  [] Fair   [] Poor     Coordination:  Gross motor: [x] WFL   [] Impaired   Fine motor: [x] WFL   [] Impaired     Functional Mobility:  Toilet Transfers:  Anticipated Mod   Bed Transfer:  Min A

## 2017-12-29 NOTE — PROGRESS NOTES
Hospitalist Progress Note      PCP: Liliane Huntley NP    Date of Admission: 12/28/2017    Chief Complaint:    Post-op day 2  Abnormality of gait and mobility d/t left total knee replacement.  is working on possible rehab placement upon discharge. Decrease appetite today. Advanced diet today. Denies N/V today. Denies CP, SOB, fever or chills. Medications:  Reviewed    Infusion Medications    sodium chloride 100 mL/hr at 12/28/17 1814    dextrose       Scheduled Medications    aspirin  81 mg Oral BID    docusate sodium  100 mg Oral BID    sennosides-docusate sodium  1 tablet Oral Daily    sodium chloride flush  10 mL Intravenous 2 times per day    buPROPion  75 mg Oral Daily    estradiol  1.25 mg Oral Daily    mometasone-formoterol  2 puff Inhalation BID    levothyroxine  200 mcg Oral Daily    metFORMIN  500 mg Oral TID    pantoprazole  40 mg Oral QAM AC    QUEtiapine  300 mg Oral Nightly    insulin lispro  0-6 Units Subcutaneous TID WC    insulin lispro  0-3 Units Subcutaneous Nightly     PRN Meds: acetaminophen, bisacodyl, magnesium hydroxide, morphine **OR** morphine, oxyCODONE-acetaminophen **OR** oxyCODONE-acetaminophen, sodium chloride flush, glucose, dextrose, glucagon (rDNA), dextrose, ondansetron, ondansetron      Intake/Output Summary (Last 24 hours) at 12/29/17 1155  Last data filed at 12/29/17 0619   Gross per 24 hour   Intake                0 ml   Output              400 ml   Net             -400 ml       Exam:    /68   Pulse 92   Temp 99 °F (37.2 °C)   Resp 17   Ht 5' 4\" (1.626 m)   Wt 254 lb (115.2 kg)   SpO2 96%   BMI 43.60 kg/m²     General appearance: No apparent distress, appears stated age and cooperative. HEENT: Pupils equal, round, and reactive to light. Conjunctivae/corneas clear. Neck: Supple, with full range of motion. No jugular venous distention. Trachea midline. Respiratory:  Normal respiratory effort.  Clear to auscultation, bilaterally

## 2017-12-29 NOTE — OP NOTE
[address]                       [address] [place], [address] [facility]                                 OPERATIVE REPORT    PATIENT NAME: Swati Carney                      :        1970  MED REC NO:   [id number]                            ROOM:       M991  ACCOUNT NO:   [de-identified]                           ADMIT DATE: 2017  PROVIDER:     Monica Shirley MD    DATE OF PROCEDURE:  2017    PREOPERATIVE DIAGNOSIS:  DJD of knee, left. POSTOPERATIVE DIAGNOSIS:  DJD of knee, left. OPERATION PERFORMED:  Left total knee replacement using [employer] Triathlon  knee system, uncemented, size 4 CR left femur, size 4 tibia, size 9 CR  articular surface, size 29 all poly patella. SURGEON:  Monica Shirley MD    ANESTHESIA:  Spinal with nerve block. BLOOD LOSS:  Less than 50 mL. ASSISTANT:  Dennise Wellington PA-C was present throughout the entire case. Given  the nature of the disease process and the procedure, a skilled surgical  first assistant was necessary during the case. The assistant was necessary  to hold retractors and manipulate the extremity during the procedure. A  certified scrub tech was at the back table managing the instruments and  supplies for the surgical case. CLINICAL NOTE:  The patient is a 69-year-old with knee pain refractory to  conservative treatment, who presents for elective total knee replacement. The procedure, its risks, benefits, treatment alternatives, and  postoperative course were discussed with her. She understood and wished to  proceed. OPERATIVE PROCEDURE:  Prior to taking the patient to the operating room,  surgical site was marked. Her H and P was reviewed, updated, and signed. She received appropriate preoperative antibiotics. The patient was taken to the operating room and placed in the supine  position, whereupon spinal anesthesia was then obtained.   She was prepped  and draped in the usual sterile manner. Exsanguinated with an Esmarch  bandage, tourniquet inflated to 300 mmHg. Surgical time-out was performed. A linear midline incision was made through the skin and subcutaneous  tissues using sharp and blunt dissection. Bovie electrocautery was used  for hemostasis. Medial and lateral femoral collateral retractors were  inserted. Using intramedullary alignment device, the distal femoral  resection was made without difficulty. Anterior, posterior, and chamfer  cuts were made. Posterior retractors were inserted. The meniscal remnants  were excised using the extramedullary alignment device. The tibial  resection was made 2 mm below the most deficient medial tibial bone  surface. Once this was complete, trial tibia was pinned in position. Poly  was inserted and patella was reamed. Peg holes were drilled. Trial  patella was applied. The knee was placed through a full range of motion. Stability was tested and felt to be satisfactory. All trial components  were then removed. All bony surfaces were irrigated with copious amount of  saline irrigation. Final components were inserted and completely seated. The capsule was closed with interrupted #1 Vicryl, 3-0 Monocryl  subcutaneous tissue, and 4-0 Monocryl subcuticular stitch. A dry sterile  bulky dressing was applied. The patient tolerated the procedure well and  was taken to the post-anesthesia care unit in good condition without  complications.         Eleni Wolf MD    D: 12/28/2017 14:16:22       T: 12/28/2017 23:57:59     [email or web address]  Job#: [id number]     Doc#: [id number]    CC:

## 2017-12-30 LAB
ANION GAP SERPL CALCULATED.3IONS-SCNC: 10 MEQ/L (ref 7–13)
BUN BLDV-MCNC: 15 MG/DL (ref 6–20)
CALCIUM SERPL-MCNC: 8.5 MG/DL (ref 8.6–10.2)
CHLORIDE BLD-SCNC: 102 MEQ/L (ref 98–107)
CO2: 28 MEQ/L (ref 22–29)
CREAT SERPL-MCNC: 0.56 MG/DL (ref 0.5–0.9)
GFR AFRICAN AMERICAN: >60
GFR NON-AFRICAN AMERICAN: >60
GLUCOSE BLD-MCNC: 102 MG/DL (ref 60–115)
GLUCOSE BLD-MCNC: 108 MG/DL (ref 74–109)
GLUCOSE BLD-MCNC: 112 MG/DL (ref 60–115)
GLUCOSE BLD-MCNC: 142 MG/DL (ref 60–115)
GLUCOSE BLD-MCNC: 96 MG/DL (ref 60–115)
HCT VFR BLD CALC: 35.7 % (ref 37–47)
HEMOGLOBIN: 12.2 G/DL (ref 12–16)
MCH RBC QN AUTO: 30.2 PG (ref 27–31.3)
MCHC RBC AUTO-ENTMCNC: 34.2 % (ref 33–37)
MCV RBC AUTO: 88.5 FL (ref 82–100)
PDW BLD-RTO: 13.3 % (ref 11.5–14.5)
PERFORMED ON: ABNORMAL
PERFORMED ON: NORMAL
PLATELET # BLD: 182 K/UL (ref 130–400)
POTASSIUM SERPL-SCNC: 4.2 MEQ/L (ref 3.5–5.1)
RBC # BLD: 4.04 M/UL (ref 4.2–5.4)
SODIUM BLD-SCNC: 140 MEQ/L (ref 132–144)
URINE CULTURE, ROUTINE: NORMAL
WBC # BLD: 8.2 K/UL (ref 4.8–10.8)

## 2017-12-30 PROCEDURE — 97116 GAIT TRAINING THERAPY: CPT

## 2017-12-30 PROCEDURE — 1210000000 HC MED SURG R&B

## 2017-12-30 PROCEDURE — 94640 AIRWAY INHALATION TREATMENT: CPT

## 2017-12-30 PROCEDURE — 97535 SELF CARE MNGMENT TRAINING: CPT

## 2017-12-30 PROCEDURE — 6370000000 HC RX 637 (ALT 250 FOR IP): Performed by: ORTHOPAEDIC SURGERY

## 2017-12-30 PROCEDURE — 6370000000 HC RX 637 (ALT 250 FOR IP): Performed by: NURSE PRACTITIONER

## 2017-12-30 PROCEDURE — 85027 COMPLETE CBC AUTOMATED: CPT

## 2017-12-30 PROCEDURE — 2580000003 HC RX 258: Performed by: ORTHOPAEDIC SURGERY

## 2017-12-30 PROCEDURE — 36415 COLL VENOUS BLD VENIPUNCTURE: CPT

## 2017-12-30 PROCEDURE — 80048 BASIC METABOLIC PNL TOTAL CA: CPT

## 2017-12-30 RX ADMIN — ASPIRIN 81 MG: 81 TABLET, COATED ORAL at 20:39

## 2017-12-30 RX ADMIN — OXYCODONE HYDROCHLORIDE AND ACETAMINOPHEN 2 TABLET: 5; 325 TABLET ORAL at 09:09

## 2017-12-30 RX ADMIN — LEVOTHYROXINE SODIUM 200 MCG: 100 TABLET ORAL at 05:00

## 2017-12-30 RX ADMIN — Medication 2 PUFF: at 10:24

## 2017-12-30 RX ADMIN — PANTOPRAZOLE SODIUM 40 MG: 40 TABLET, DELAYED RELEASE ORAL at 05:00

## 2017-12-30 RX ADMIN — OXYCODONE HYDROCHLORIDE AND ACETAMINOPHEN 2 TABLET: 5; 325 TABLET ORAL at 20:39

## 2017-12-30 RX ADMIN — METFORMIN HYDROCHLORIDE 500 MG: 500 TABLET ORAL at 16:50

## 2017-12-30 RX ADMIN — METFORMIN HYDROCHLORIDE 500 MG: 500 TABLET ORAL at 20:39

## 2017-12-30 RX ADMIN — DOCUSATE SODIUM 100 MG: 100 CAPSULE, LIQUID FILLED ORAL at 09:08

## 2017-12-30 RX ADMIN — BUPROPION HYDROCHLORIDE 75 MG: 75 TABLET, FILM COATED ORAL at 09:08

## 2017-12-30 RX ADMIN — Medication 2 PUFF: at 19:46

## 2017-12-30 RX ADMIN — QUETIAPINE FUMARATE 300 MG: 300 TABLET ORAL at 20:39

## 2017-12-30 RX ADMIN — ESTRADIOL 1.25 MG: 1 TABLET ORAL at 09:08

## 2017-12-30 RX ADMIN — SENNOSIDES AND DOCUSATE SODIUM 1 TABLET: 8.6; 5 TABLET ORAL at 09:08

## 2017-12-30 RX ADMIN — DOCUSATE SODIUM 100 MG: 100 CAPSULE, LIQUID FILLED ORAL at 20:39

## 2017-12-30 RX ADMIN — METFORMIN HYDROCHLORIDE 500 MG: 500 TABLET ORAL at 09:09

## 2017-12-30 RX ADMIN — OXYCODONE HYDROCHLORIDE AND ACETAMINOPHEN 2 TABLET: 5; 325 TABLET ORAL at 04:43

## 2017-12-30 RX ADMIN — ASPIRIN 81 MG: 81 TABLET, COATED ORAL at 09:08

## 2017-12-30 RX ADMIN — SODIUM CHLORIDE, PRESERVATIVE FREE 10 ML: 5 INJECTION INTRAVENOUS at 20:40

## 2017-12-30 ASSESSMENT — PAIN SCALES - GENERAL
PAINLEVEL_OUTOF10: 5
PAINLEVEL_OUTOF10: 0
PAINLEVEL_OUTOF10: 8
PAINLEVEL_OUTOF10: 3
PAINLEVEL_OUTOF10: 0
PAINLEVEL_OUTOF10: 7
PAINLEVEL_OUTOF10: 5

## 2017-12-30 ASSESSMENT — PAIN DESCRIPTION - DESCRIPTORS
DESCRIPTORS: ACHING;SHARP
DESCRIPTORS: ACHING

## 2017-12-30 ASSESSMENT — PAIN DESCRIPTION - PAIN TYPE
TYPE: ACUTE PAIN;SURGICAL PAIN
TYPE: ACUTE PAIN;SURGICAL PAIN

## 2017-12-30 ASSESSMENT — PAIN DESCRIPTION - LOCATION
LOCATION: KNEE
LOCATION: KNEE

## 2017-12-30 ASSESSMENT — PAIN DESCRIPTION - ORIENTATION
ORIENTATION: LEFT
ORIENTATION: LEFT

## 2017-12-30 NOTE — PROGRESS NOTES
restrictions: knee immobilizer on for gait and transfers until pt able to SLR    Subjective   General  Chart Reviewed: Yes  Family / Caregiver Present: No  Pre Treatment Pain Screening  Pain at present: 5  Scale Used: Numeric Score  Intervention List: Patient able to continue with treatment    Pain Screening  Patient Currently in Pain: Yes     Pain Reassessment:   Pain Assessment  Pain Assessment: 0-10  Pain Level: 5  Pain Type: Acute pain;Surgical pain  Pain Location: Knee  Pain Orientation: Left  Pain Descriptors: Aching; Sharp  Pain Intervention(s): Cold applied;Repositioned;RN notified       Orientation  Orientation  Overall Orientation Status: Within Functional Limits    Objective   Bed mobility  Supine to Sit: Stand by assistance  Comment: flat bed    Transfers  Sit to Stand: Stand by assistance;Contact guard assistance  Stand to sit: Stand by assistance  Comment: at [facility] with VCs for safety/sequencing with good follow thru    Ambulation  Ambulation?: Yes  Ambulation 1  Surface: level tile  Device: Rolling Walker  Other Apparatus: Knee Immobilizer  Assistance: Stand by assistance  Quality of Gait: decreased step length and stance  Distance: 25' x 2  Comments: VCs for safety/sequencing with good follow thru       Balance  Standing - Static: Fair  Standing - Dynamic: Fair  Comments: at [facility]  Exercises  Straight Leg Raise: unable -  knee immob needed  Heelslides: x  Comments: (R) knee flex 53*         Cryotherapy (Minutes\Location): ice PRN for pain           Assessment   Activity Tolerance  Activity Tolerance: Patient Tolerated treatment well     Discharge Recommendations:  Continue to assess pending progress    Goals  Short term goals  Short term goal 1: indep bed mobility  Short term goal 2: min assist transfers with ww  Short term goal 3: min assist  giait with appropriate device 50 feet  Short term goal 4: min assist stairs  Long term goals  Long term goal 1: indep transfers  Long term goal 2: indep gait 50 feet  Long term goal 3: indep flight of stairs with 1 rail  Long term goal 4: good left knee strength to eliminate need for immobilizer    Plan    Plan  Times per week: 7  Times per day: Twice a day  Current Treatment Recommendations: Strengthening, ROM, Transfer Training, Balance Training, Functional Mobility Training, Gait Training, Stair training, Neuromuscular Re-education, Home Exercise Program, Safety Education & Training, Patient/Caregiver Education & Training, Equipment Evaluation, Education, & procurement  Plan Comment: cont current POC  Safety Devices  Type of devices: Call light within reach, Chair alarm in place, Left in chair, Nurse notified     Therapy Time   Individual   Time In 0840   Time Out 0858   Minutes 18      Gait15  Therex 3       James Elizondo, KUSHAL, 12/30/17 at 8:58 AM

## 2017-12-30 NOTE — PROGRESS NOTES
progress    Goals  Short term goals  Short term goal 1: indep bed mobility  Short term goal 2: min assist transfers with ww  Short term goal 3: min assist  giait with appropriate device 50 feet  Short term goal 4: min assist stairs  Long term goals  Long term goal 1: indep transfers  Long term goal 2: indep gait 50 feet  Long term goal 3: indep flight of stairs with 1 rail  Long term goal 4: good left knee strength to eliminate need for immobilizer    Plan    Plan  Times per week: 7  Times per day: Twice a day  Current Treatment Recommendations: Strengthening, ROM, Transfer Training, Balance Training, Functional Mobility Training, Gait Training, Stair training, Neuromuscular Re-education, Home Exercise Program, Safety Education & Training, Patient/Caregiver Education & Training, Equipment Evaluation, Education, & procurement  Plan Comment: cont current POC  Safety Devices  Type of devices: Call light within reach, Chair alarm in place, Left in chair, Nurse notified     Therapy Time   Individual   Time In 1307   Time Out 1345   Minutes 38      Gait 20  Trsfs 15  Therex 3        Maria Kessler PTA, 12/30/17 at 1:43 PM

## 2017-12-31 VITALS
WEIGHT: 254 LBS | RESPIRATION RATE: 16 BRPM | HEIGHT: 64 IN | OXYGEN SATURATION: 98 % | DIASTOLIC BLOOD PRESSURE: 63 MMHG | HEART RATE: 68 BPM | TEMPERATURE: 97.5 F | SYSTOLIC BLOOD PRESSURE: 105 MMHG | BODY MASS INDEX: 43.36 KG/M2

## 2017-12-31 LAB
GLUCOSE BLD-MCNC: 103 MG/DL (ref 60–115)
GLUCOSE BLD-MCNC: 82 MG/DL (ref 60–115)
HCT VFR BLD CALC: 35.7 % (ref 37–47)
HEMOGLOBIN: 12.2 G/DL (ref 12–16)
MCH RBC QN AUTO: 30 PG (ref 27–31.3)
MCHC RBC AUTO-ENTMCNC: 34 % (ref 33–37)
MCV RBC AUTO: 88.3 FL (ref 82–100)
PDW BLD-RTO: 13.3 % (ref 11.5–14.5)
PERFORMED ON: NORMAL
PERFORMED ON: NORMAL
PLATELET # BLD: 197 K/UL (ref 130–400)
RBC # BLD: 4.05 M/UL (ref 4.2–5.4)
WBC # BLD: 8.7 K/UL (ref 4.8–10.8)

## 2017-12-31 PROCEDURE — 94640 AIRWAY INHALATION TREATMENT: CPT

## 2017-12-31 PROCEDURE — 6370000000 HC RX 637 (ALT 250 FOR IP): Performed by: ORTHOPAEDIC SURGERY

## 2017-12-31 PROCEDURE — 36415 COLL VENOUS BLD VENIPUNCTURE: CPT

## 2017-12-31 PROCEDURE — 85027 COMPLETE CBC AUTOMATED: CPT

## 2017-12-31 PROCEDURE — 2580000003 HC RX 258: Performed by: ORTHOPAEDIC SURGERY

## 2017-12-31 PROCEDURE — 6370000000 HC RX 637 (ALT 250 FOR IP): Performed by: NURSE PRACTITIONER

## 2017-12-31 PROCEDURE — 97110 THERAPEUTIC EXERCISES: CPT

## 2017-12-31 PROCEDURE — 97116 GAIT TRAINING THERAPY: CPT

## 2017-12-31 RX ORDER — OXYCODONE HYDROCHLORIDE AND ACETAMINOPHEN 5; 325 MG/1; MG/1
2 TABLET ORAL EVERY 6 HOURS PRN
Qty: 40 TABLET | Refills: 0 | Status: SHIPPED | OUTPATIENT
Start: 2017-12-31 | End: 2018-01-07

## 2017-12-31 RX ORDER — ASPIRIN 81 MG/1
81 TABLET ORAL 2 TIMES DAILY
Qty: 30 TABLET | Refills: 3 | Status: ON HOLD | OUTPATIENT
Start: 2017-12-31 | End: 2018-04-27

## 2017-12-31 RX ORDER — OXYCODONE HYDROCHLORIDE AND ACETAMINOPHEN 5; 325 MG/1; MG/1
2 TABLET ORAL EVERY 6 HOURS PRN
Qty: 40 TABLET | Refills: 0 | Status: SHIPPED | OUTPATIENT
Start: 2017-12-31 | End: 2017-12-31

## 2017-12-31 RX ADMIN — OXYCODONE HYDROCHLORIDE AND ACETAMINOPHEN 2 TABLET: 5; 325 TABLET ORAL at 00:49

## 2017-12-31 RX ADMIN — DOCUSATE SODIUM 100 MG: 100 CAPSULE, LIQUID FILLED ORAL at 08:34

## 2017-12-31 RX ADMIN — METFORMIN HYDROCHLORIDE 500 MG: 500 TABLET ORAL at 08:34

## 2017-12-31 RX ADMIN — Medication 2 PUFF: at 08:50

## 2017-12-31 RX ADMIN — ESTRADIOL 1.25 MG: 1 TABLET ORAL at 08:34

## 2017-12-31 RX ADMIN — LEVOTHYROXINE SODIUM 200 MCG: 100 TABLET ORAL at 06:03

## 2017-12-31 RX ADMIN — PANTOPRAZOLE SODIUM 40 MG: 40 TABLET, DELAYED RELEASE ORAL at 06:03

## 2017-12-31 RX ADMIN — BUPROPION HYDROCHLORIDE 75 MG: 75 TABLET, FILM COATED ORAL at 08:34

## 2017-12-31 RX ADMIN — SODIUM CHLORIDE, PRESERVATIVE FREE 10 ML: 5 INJECTION INTRAVENOUS at 08:34

## 2017-12-31 RX ADMIN — ASPIRIN 81 MG: 81 TABLET, COATED ORAL at 08:34

## 2017-12-31 RX ADMIN — OXYCODONE HYDROCHLORIDE AND ACETAMINOPHEN 2 TABLET: 5; 325 TABLET ORAL at 13:19

## 2017-12-31 RX ADMIN — OXYCODONE HYDROCHLORIDE AND ACETAMINOPHEN 1 TABLET: 5; 325 TABLET ORAL at 08:34

## 2017-12-31 RX ADMIN — SENNOSIDES AND DOCUSATE SODIUM 1 TABLET: 8.6; 5 TABLET ORAL at 08:34

## 2017-12-31 ASSESSMENT — PAIN SCALES - GENERAL
PAINLEVEL_OUTOF10: 7
PAINLEVEL_OUTOF10: 7
PAINLEVEL_OUTOF10: 5
PAINLEVEL_OUTOF10: 5
PAINLEVEL_OUTOF10: 7

## 2017-12-31 ASSESSMENT — PAIN DESCRIPTION - ORIENTATION
ORIENTATION: LEFT
ORIENTATION: LEFT

## 2017-12-31 ASSESSMENT — PAIN DESCRIPTION - LOCATION
LOCATION: KNEE
LOCATION: KNEE

## 2017-12-31 ASSESSMENT — PAIN DESCRIPTION - DESCRIPTORS
DESCRIPTORS: SORE
DESCRIPTORS: ACHING

## 2017-12-31 ASSESSMENT — PAIN DESCRIPTION - FREQUENCY: FREQUENCY: CONTINUOUS

## 2017-12-31 ASSESSMENT — PAIN DESCRIPTION - PAIN TYPE
TYPE: SURGICAL PAIN
TYPE: SURGICAL PAIN

## 2017-12-31 NOTE — PROGRESS NOTES
Physical Therapy Med Surg Daily Treatment Note  Fac[facility]Department: [facility]  Room: N224/N224-01       NAME: Abdirahman Riley  : 1970 (2 y.o.)  MRN: [id number]  CODE STATUS: Full Code    Date of Service: 2017    Patient Diagnosis(es): Status post total left knee replacement [Z96.652]   No chief complaint on file.     Patient Active Problem List    Diagnosis Date Noted    Status post total left knee replacement 2017    Osteoarthritis of left knee 2017    Hypothyroidism (acquired) 2014    Multinodular goiter (nontoxic) 2014        Past Medical History:   Diagnosis Date    Arthritis     Asthma     [facility]     COPD [facility]chronic obstructive pulmonary disease) [facility]     Diabetes mellitus ([facility]     Hyperlipidemia     diet /exercise management    JOSEPH on CPAP     PONV (postoperative nausea and vomiting)     Sleep apnea     Thyroid disease      Past Surgical History:   Procedure Laterality Date    ACHILLES TENDON SURGERY Bilateral     APPENDECTOMY      BREAST SURGERY       SECTION      x2    CHOLECYSTECTOMY      HERNIA REPAIR      HYSTERECTOMY      KNEE SURGERY Left     meniscus    OTHER SURGICAL HISTORY      scar tissue removal from left foot     NH KNEE SCOPE,DIAGNOSTIC Left 3/2/2017    LEFT KNEE ARTHROSCOPIC PARTIAL MEDIAL MENISCECTOMY , LATEX ALLERGY  performed by Francisca Boyd MD at [facility] Right 2017    RIGHT KNEE ARTHROSCOPY CHONDROPLASTY, LATEX ALLERGY  performed by Francisca Boyd MD at [address] [place] [address],[facility] Left 2017    LEFT  KNEE TOTAL KNEE ARTHROPLASTY performed by Francisca Boyd MD at [place] [address] Bilateral          Restrictions  Restrictions/Precautions: Weight Bearing  Lower Extremity Weight Bearing Restrictions  Left Lower Extremity Weight Bearing: Weight Bearing As Tolerated  Position Activity Restriction  Other position/activity

## 2017-12-31 NOTE — CARE COORDINATION
LSW spoke with [facility] and let them know that pt will be going home today and not to the skilled facility. Requested info was faxed to [facility]. Pt aware.

## 2018-01-18 ENCOUNTER — HOSPITAL ENCOUNTER (OUTPATIENT)
Dept: PHYSICAL THERAPY | Age: 48
Setting detail: THERAPIES SERIES
Discharge: HOME | End: 2018-01-18
Payer: COMMERCIAL

## 2018-01-18 PROCEDURE — 97161 PT EVAL LOW COMPLEX 20 MIN: CPT

## 2018-01-18 PROCEDURE — 97110 THERAPEUTIC EXERCISES: CPT

## 2018-01-18 ASSESSMENT — PAIN SCALES - GENERAL: PAINLEVEL_OUTOF10: 3

## 2018-01-18 ASSESSMENT — PAIN DESCRIPTION - ORIENTATION: ORIENTATION: LEFT

## 2018-01-18 ASSESSMENT — PAIN DESCRIPTION - LOCATION: LOCATION: KNEE

## 2018-01-18 NOTE — PROGRESS NOTES
knee -4-90 degrees, and PROM -4-101 degrees. General ROM and strengthening exercises initiated for POC.    Prognosis: Good  Decision Making: Low Complexity  History: see above  Exam: LEFS 18/80 - 77.5% impaired  Clinical Presentation: stable    Clinical Presentation:  [x] Stable/Uncomplicated  [] Evolving [] Unstable/Unpredictable  The Following Comorbities will impact the patients progression and Plan of Care:   [x] Diabetes    [] Stroke  [] Cardiac Disease/Pacemaker [x] Previous Orthopedic Injury/Surgery  [] Seizure Disorder   [] WB Restrictions  [] Obesity    [] Neuropathy     Education:   Patient Education: Pt educated on POC and HEP       Goals  Short term goals  Time Frame for Short term goals: 9  Short term goal 1: Improve L knee AROM to 0-110 degrees in order to restore normal ROM  Short term goal 2: Improve L knee Extension to 5/5 for improve strength for functional activities  Short term goal 3: Improve L knee PROM to 0-125 in order to restore functional range  Short term goal 4: Pt to be indep with HEP    Long term goals  Time Frame for Long term goals : 18  Long term goal 1: Pt to have B hip and knee strength of 5/5 B in order to return to all functional activities  Long term goal 2: Pt to have LEFS score >50/80 in order to improve indep with ADLs  Long term goal 3: Gait WFL without limp even ground and stairs    Patient's Goal:    To regain strength/ROM for functional activities    Time In: 1255  Time Out: 1340       Cait Valencia,PT  1/18/2018

## 2018-01-22 ENCOUNTER — HOSPITAL ENCOUNTER (OUTPATIENT)
Dept: PHYSICAL THERAPY | Age: 48
Setting detail: THERAPIES SERIES
Discharge: HOME | End: 2018-01-22
Payer: COMMERCIAL

## 2018-01-22 PROCEDURE — 97110 THERAPEUTIC EXERCISES: CPT

## 2018-01-22 ASSESSMENT — PAIN SCALES - GENERAL: PAINLEVEL_OUTOF10: 3

## 2018-01-22 ASSESSMENT — PAIN DESCRIPTION - ORIENTATION: ORIENTATION: LEFT

## 2018-01-22 ASSESSMENT — PAIN DESCRIPTION - LOCATION: LOCATION: KNEE

## 2018-01-24 ENCOUNTER — HOSPITAL ENCOUNTER (OUTPATIENT)
Dept: PHYSICAL THERAPY | Age: 48
Setting detail: THERAPIES SERIES
End: 2018-01-24
Payer: COMMERCIAL

## 2018-01-26 ENCOUNTER — HOSPITAL ENCOUNTER (OUTPATIENT)
Dept: PHYSICAL THERAPY | Age: 48
Setting detail: THERAPIES SERIES
Discharge: HOME | End: 2018-01-26
Payer: COMMERCIAL

## 2018-01-26 PROCEDURE — 97110 THERAPEUTIC EXERCISES: CPT

## 2018-01-26 ASSESSMENT — PAIN SCALES - GENERAL: PAINLEVEL_OUTOF10: 5

## 2018-01-30 ENCOUNTER — HOSPITAL ENCOUNTER (OUTPATIENT)
Dept: PHYSICAL THERAPY | Age: 48
Setting detail: THERAPIES SERIES
Discharge: HOME | End: 2018-01-30
Payer: COMMERCIAL

## 2018-01-30 NOTE — PROGRESS NOTES
[facility]P [facility]  Rehab and Wellness    Date: 2018  Patient Name: Susan Ewing        : 1970       [] Pt No Showed Appt           [x] Pt Cancelled Appt:  [x] No Reason Given   [] Sick/ill   [] Other:      Nitza Long Date: 2018

## 2018-01-31 ENCOUNTER — HOSPITAL ENCOUNTER (OUTPATIENT)
Dept: PHYSICAL THERAPY | Age: 48
Setting detail: THERAPIES SERIES
Discharge: HOME | End: 2018-01-31
Payer: COMMERCIAL

## 2018-01-31 PROCEDURE — 97110 THERAPEUTIC EXERCISES: CPT

## 2018-01-31 ASSESSMENT — PAIN SCALES - GENERAL: PAINLEVEL_OUTOF10: 0

## 2018-01-31 NOTE — PROGRESS NOTES
Phone: [address]      Fax: [phone]                            Outpatient Physical Therapy                                                                            Daily Note    Date: 2018  Patient Name: Cande Troncoso        MRN: [id number]   ACCT#:  [de-identified]  : 1970  (50 y.o.)    Referring Practitioner: Dr Janelle Trejo    Referral Date : 18    Diagnosis: TKA, Left  Treatment Diagnosis: left TKA    Onset Date: 17  PT Insurance Information: [facility]  Total # of Visits Approved: 18 Per Physician Order  Total # of Visits to Date: 4  No Show: 0  Canceled Appointment: 1    Pre-Treatment Pain:  0/10     Assessment  Assessment: Pt. denies pain prior to treatment this afternoon. She reports she called the  about the small scab on her incision and he told her to keep the scab uncovered. Continued with exercises as outlined with good tolerance from pt. L knee PROM flex = 114 deg.    Chart Reviewed: Yes    Plan  Plan: Continue with current plan    Exercises/Modalities/Manual:  See DocFlow Sheet    Education:           Goals  (Total # of Visits to Date: 4)   Short Term Goals - Time Frame for Short term goals: 9  Short term goal 1: Improve L knee AROM to 0-110 degrees in order to restore normal ROM  Short term goal 2: Improve L knee Extension to 5/5 for improve strength for functional activities  Short term goal 3: Improve L knee PROM to 0-125 in order to restore functional range  Short term goal 4: Pt to be indep with HEP       Long Term Goals - Time Frame for Long term goals : 18  Long term goal 1: Pt to have B hip and knee strength of 5/5 B in order to return to all functional activities  Long term goal 2: Pt to have LEFS score >50/80 in order to improve indep with ADLs  Long term goal 3: Gait WFL without limp even ground and stairs          Post Treatment Pain:  0/10    Time In: 1645  Time Out: 1725  Timed Code Treatment Minutes: 40 Minutes  Total

## 2018-02-05 ENCOUNTER — HOSPITAL ENCOUNTER (OUTPATIENT)
Dept: PHYSICAL THERAPY | Age: 48
Setting detail: THERAPIES SERIES
Discharge: HOME | End: 2018-02-05
Payer: COMMERCIAL

## 2018-02-05 PROCEDURE — 97110 THERAPEUTIC EXERCISES: CPT

## 2018-02-05 NOTE — PROGRESS NOTES
Phone: [phone]           Fax: [phone]                            Outpatient Physical Therapy                                                                            Daily Note    Date: 2018  Patient Name: Nita Bruner        MRN: [id number]   ACCT#:  [de-identified]  : 1970  (50 y.o.)    Referring Practitioner: Dr Shira Roman    Referral Date : 18    Diagnosis: TKA, Left  Treatment Diagnosis: left TKA    Onset Date: 17  PT Insurance Information: [facility]  Total # of Visits Approved: 18 Per Physician Order  Total # of Visits to Date: 5  Canceled Appointment: 1    Pre-Treatment Pain:  0/10     Assessment  Assessment: Pt denies pain this date. Visit with physician went well, recommended continuing therapy for 6 more weeks. PROM L knee at 112 deg; AROM at 100 deg.   L hip F with 4/5, and 5/5 left knee E.     Plan  Plan: Continue with current plan    Exercises/Modalities/Manual:  See DocFlow Sheet    Education: educated pt on technique with exercises          Goals  (Total # of Visits to Date: 5)   Short Term Goals - Time Frame for Short term goals: 9  Short term goal 1: Improve L knee AROM to 0-110 degrees in order to restore normal ROM  Short term goal 2: Improve L knee Extension to 5/5 for improve strength for functional activities-met  Short term goal 3: Improve L knee PROM to 0-125 in order to restore functional range  Short term goal 4: Pt to be indep with HEP - MET       Long Term Goals - Time Frame for Long term goals : 25  Long term goal 1: Pt to have B hip and knee strength of 5/5 B in order to return to all functional activities  Long term goal 2: Pt to have LEFS score >50/80 in order to improve indep with ADLs  Long term goal 3: Gait WFL without limp even ground and stairs          Post Treatment Pain:  0/10    Time In: 11:20  Time Out: 12:00  Timed Code Treatment Minutes: 40 Minutes  Total Treatment Time: 40 Minutes    Oswaldo Moarles

## 2018-02-07 ENCOUNTER — HOSPITAL ENCOUNTER (OUTPATIENT)
Dept: PHYSICAL THERAPY | Age: 48
Setting detail: THERAPIES SERIES
Discharge: HOME | End: 2018-02-07
Payer: COMMERCIAL

## 2018-02-07 PROCEDURE — 97110 THERAPEUTIC EXERCISES: CPT

## 2018-02-09 ENCOUNTER — HOSPITAL ENCOUNTER (OUTPATIENT)
Dept: PHYSICAL THERAPY | Age: 48
Setting detail: THERAPIES SERIES
Discharge: HOME | End: 2018-02-09
Payer: COMMERCIAL

## 2018-02-09 PROCEDURE — 97110 THERAPEUTIC EXERCISES: CPT

## 2018-02-09 NOTE — PROGRESS NOTES
Phone: [phone]      Fax: [phone]                            Outpatient Physical Therapy                                                                            Daily Note    Date: 2018  Patient Name: Nita Bruner        MRN: [id number]   ACCT#:  [de-identified]  : 1970  (50 y.o.)    Referring Practitioner: Dr Shira Roman    Referral Date : 18    Diagnosis: TKA, Left  Treatment Diagnosis: left TKA    Onset Date: 17  PT Insurance Information: [facility]  Total # of Visits Approved: 18 Per Physician Order  Total # of Visits to Date: 7  No Show: 0  Canceled Appointment: 1    Pre-Treatment Pain:  0/10     Assessment  Assessment: Pt. denies L knee pain this morning. Continued with exercises as outlined with good tolerance from pt. L knee PROM flex = 114 deg. and AROM flex = 100 deg. Will continue to progress strength and AROM.    Chart Reviewed: Yes    Plan  Plan: Continue with current plan    Exercises/Modalities/Manual:  See DocFlow Sheet    Education:           Goals  (Total # of Visits to Date: 7)   Short Term Goals - Time Frame for Short term goals: 9  Short term goal 1: Improve L knee AROM to 0-110 degrees in order to restore normal ROM  Short term goal 2: Improve L knee Extension to 5/5 for improve strength for functional activities-met  Short term goal 3: Improve L knee PROM to 0-125 in order to restore functional range  Short term goal 4: Pt to be indep with HEP - MET       Long Term Goals - Time Frame for Long term goals : 25  Long term goal 1: Pt to have B hip and knee strength of 5/5 B in order to return to all functional activities  Long term goal 2: Pt to have LEFS score >50/80 in order to improve indep with ADLs  Long term goal 3: Gait WFL without limp even ground and stairs          Post Treatment Pain:  0/10    Time In: 1137  Time Out: 1215  Timed Code Treatment Minutes: 38 Minutes  Total Treatment Time: Rosi Jansen

## 2018-02-12 ENCOUNTER — HOSPITAL ENCOUNTER (OUTPATIENT)
Dept: PHYSICAL THERAPY | Age: 48
Setting detail: THERAPIES SERIES
Discharge: HOME | End: 2018-02-12
Payer: COMMERCIAL

## 2018-02-12 PROCEDURE — 97110 THERAPEUTIC EXERCISES: CPT

## 2018-02-12 ASSESSMENT — PAIN SCALES - GENERAL: PAINLEVEL_OUTOF10: 3

## 2018-02-14 ENCOUNTER — HOSPITAL ENCOUNTER (OUTPATIENT)
Dept: PHYSICAL THERAPY | Age: 48
Setting detail: THERAPIES SERIES
Discharge: HOME | End: 2018-02-14
Payer: COMMERCIAL

## 2018-02-14 PROCEDURE — 97110 THERAPEUTIC EXERCISES: CPT

## 2018-02-16 ENCOUNTER — APPOINTMENT (OUTPATIENT)
Dept: PHYSICAL THERAPY | Age: 48
End: 2018-02-16
Payer: COMMERCIAL

## 2018-02-19 ENCOUNTER — HOSPITAL ENCOUNTER (OUTPATIENT)
Dept: PHYSICAL THERAPY | Age: 48
Setting detail: THERAPIES SERIES
Discharge: HOME | End: 2018-02-19
Payer: COMMERCIAL

## 2018-02-19 PROCEDURE — 97110 THERAPEUTIC EXERCISES: CPT

## 2018-02-21 ENCOUNTER — APPOINTMENT (OUTPATIENT)
Dept: PHYSICAL THERAPY | Age: 48
End: 2018-02-21
Payer: COMMERCIAL

## 2018-02-23 ENCOUNTER — HOSPITAL ENCOUNTER (OUTPATIENT)
Dept: PHYSICAL THERAPY | Age: 48
Setting detail: THERAPIES SERIES
Discharge: HOME | End: 2018-02-23
Payer: COMMERCIAL

## 2018-02-23 PROCEDURE — 97110 THERAPEUTIC EXERCISES: CPT

## 2018-02-23 NOTE — PROGRESS NOTES
40 Minutes  Total Treatment Time: 44 Minutes    Oswaldo Cerda, SPT /Directly Supervised by Lindie Hamman, PT     Date: 2/23/2018

## 2018-02-28 ENCOUNTER — HOSPITAL ENCOUNTER (OUTPATIENT)
Dept: PHYSICAL THERAPY | Age: 48
Setting detail: THERAPIES SERIES
Discharge: HOME | End: 2018-02-28
Payer: COMMERCIAL

## 2018-02-28 PROCEDURE — 97110 THERAPEUTIC EXERCISES: CPT

## 2018-03-01 ENCOUNTER — APPOINTMENT (OUTPATIENT)
Dept: PHYSICAL THERAPY | Age: 48
End: 2018-03-01
Payer: COMMERCIAL

## 2018-03-02 ENCOUNTER — HOSPITAL ENCOUNTER (OUTPATIENT)
Dept: PHYSICAL THERAPY | Age: 48
Setting detail: THERAPIES SERIES
Discharge: HOME | End: 2018-03-02
Payer: COMMERCIAL

## 2018-03-02 PROCEDURE — 97110 THERAPEUTIC EXERCISES: CPT

## 2018-03-02 NOTE — PLAN OF CARE
[facility] Phone: [phone]   Date: 3/1/2018                  Outpatient Physical Therapy Fax: [phone]    ACCT #: [de-identified]                  Recertification  Patient: Livia Barkley  : 1970                     Referring Practitioner: Dr Jose Manuel Ortiz    Referral Date : 18    Diagnosis: TKA, Left  Treatment Diagnosis: left TKA     Onset Date: 17  PT [facility]  Total # of Visits Approved: 18 Per Physician Order  Total # of Visits to Date: 12  No Show: 0  Canceled Appointment: 2     Assessment: Pt denies L knee pain. Completed exs as outlined. Pt able to demonstrate reciprocal step pattern while amb on stairs. 112 deg active L knee F, PROM 117 deg. Amb with minimal limp.  Progressed resistance with some exs SLR and LAQs.      Goals  (Total # of Visits to Date: 6)   Short Term Goals - Time Frame for Short term goals: 9  Short term goal 1: Improve L knee AROM to 0-110 degrees in order to restore normal ROM - MET  Short term goal 2: Improve L knee Extension to 5/5 for improve strength for functional activities-met  Short term goal 3: Improve L knee PROM to 0-125 in order to restore functional range - NOT MET  Short term goal 4: Pt to be indep with HEP - MET     Long Term Goals - Time Frame for Long term goals : 25  Long term goal 1: Pt to have B hip and knee strength of 5/5 B in order to return to all functional activities  Long term goal 2: Pt to have LEFS score >50/80 in order to improve indep with ADLs  Long term goal 3: Gait WFL without limp even ground and stairs       Treatment Plan     2 times per week    Additional 3   weeks    [] HP/CP     [] Electrical Stimulation   [x]  Therapeutic Exercise   []  Gait Training  [] Traction   [] Ultrasound                   []  Massage                        []  Work Conditioning   [] Aquatics  [] Back Education            []  Therapeutic Activity [x]  Manual Therapy  [x]  Patient Education/HEP []  Anodyne

## 2018-03-05 ENCOUNTER — HOSPITAL ENCOUNTER (OUTPATIENT)
Dept: PHYSICAL THERAPY | Age: 48
Setting detail: THERAPIES SERIES
Discharge: HOME | End: 2018-03-05
Payer: COMMERCIAL

## 2018-03-05 PROCEDURE — 97110 THERAPEUTIC EXERCISES: CPT

## 2018-03-09 ENCOUNTER — HOSPITAL ENCOUNTER (OUTPATIENT)
Dept: PHYSICAL THERAPY | Age: 48
Setting detail: THERAPIES SERIES
Discharge: HOME | End: 2018-03-09
Payer: COMMERCIAL

## 2018-03-09 PROCEDURE — 97110 THERAPEUTIC EXERCISES: CPT

## 2018-03-09 NOTE — PROGRESS NOTES
Phone: [phone]           Fax: [phone]                            Outpatient Physical Therapy                                                                            Daily Note    Date: 3/9/2018  Patient Name: Livia Barkley        MRN: [id number]   ACCT#:  [de-identified]  : 1970  (50 y.o.)    Referring Practitioner: Dr Jose Manuel Ortiz    Referral Date : 18    Diagnosis: TKA, Left  Treatment Diagnosis: left TKA    Onset Date: 17  PT Insurance Information: [facility]  Total # of Visits Approved: 18 Per Physician Order  Total # of Visits to Date: 15  No Show: 0  Canceled Appointment: 2    Pre-Treatment Pain:  0/10     Assessment  Assessment: Pt denies L knee pain and agreeable for d/c. Pt reports adherence at home. AROM L knee 0-109 deg; PROM L knee 0-115. Ambulation without evident limp equal stride length without AD. 5/5 strength B knee extn, 4+/5 B knee flexion, 4/5 B hip flexion.  LEFS=26/80    Plan  Plan: Discharge    Exercises/Modalities/Manual:  See DocFlow Sheet    Education: Educated pt on technique with exs          Goals  (Total # of Visits to Date: 13)   Short Term Goals - Time Frame for Short term goals: 9  Short term goal 1: Improve L knee AROM to 0-110 degrees in order to restore normal ROM - MET  Short term goal 2: Improve L knee Extension to 5/5 for improve strength for functional activities-met  Short term goal 3: Improve L knee PROM to 0-125 in order to restore functional range - NOT MET  Short term goal 4: Pt to be indep with HEP - MET       Long Term Goals - Time Frame for Long term goals : 25  Long term goal 1: Pt to have B hip and knee strength of 5/5 B in order to return to all functional activities-Not Met  Long term goal 2: Pt to have LEFS score >50/80 in order to improve indep with ADLs-NOT MET  Long term goal 3: Gait WFL without limp even ground and stairs-Met          Post Treatment Pain:  0/10      Time In: 853  Time Out: 938  Timed

## 2018-04-19 ENCOUNTER — HOSPITAL ENCOUNTER (OUTPATIENT)
Dept: PREADMISSION TESTING | Age: 48
Discharge: HOME | End: 2018-04-23
Payer: COMMERCIAL

## 2018-04-19 VITALS
RESPIRATION RATE: 16 BRPM | HEART RATE: 58 BPM | SYSTOLIC BLOOD PRESSURE: 111 MMHG | BODY MASS INDEX: 42.88 KG/M2 | WEIGHT: 257.4 LBS | DIASTOLIC BLOOD PRESSURE: 68 MMHG | OXYGEN SATURATION: 98 % | TEMPERATURE: 97.4 F | HEIGHT: 65 IN

## 2018-04-19 PROBLEM — M17.11 OSTEOARTHRITIS OF RIGHT KNEE: Status: ACTIVE | Noted: 2018-04-19

## 2018-04-19 LAB
ABO/RH: NORMAL
ANION GAP SERPL CALCULATED.3IONS-SCNC: 13 MEQ/L (ref 7–13)
ANTIBODY SCREEN: NORMAL
BACTERIA: ABNORMAL /HPF
BILIRUBIN URINE: NEGATIVE
BLOOD, URINE: ABNORMAL
BUN BLDV-MCNC: 15 MG/DL (ref 6–20)
CALCIUM SERPL-MCNC: 9.6 MG/DL (ref 8.6–10.2)
CHLORIDE BLD-SCNC: 100 MEQ/L (ref 98–107)
CLARITY: CLEAR
CO2: 29 MEQ/L (ref 22–29)
COLOR: YELLOW
CREAT SERPL-MCNC: 0.7 MG/DL (ref 0.5–0.9)
EPITHELIAL CELLS, UA: ABNORMAL /HPF
GFR AFRICAN AMERICAN: >60
GFR NON-AFRICAN AMERICAN: >60
GLUCOSE BLD-MCNC: 93 MG/DL (ref 74–109)
GLUCOSE URINE: NEGATIVE MG/DL
HCT VFR BLD CALC: 44 % (ref 37–47)
HEMOGLOBIN: 15 G/DL (ref 12–16)
KETONES, URINE: NEGATIVE MG/DL
LEUKOCYTE ESTERASE, URINE: NEGATIVE
MCH RBC QN AUTO: 29.5 PG (ref 27–31.3)
MCHC RBC AUTO-ENTMCNC: 34.2 % (ref 33–37)
MCV RBC AUTO: 86.5 FL (ref 82–100)
MUCUS: PRESENT
NITRITE, URINE: NEGATIVE
PDW BLD-RTO: 14.6 % (ref 11.5–14.5)
PH UA: 5 (ref 5–9)
PLATELET # BLD: 291 K/UL (ref 130–400)
POTASSIUM SERPL-SCNC: 4.6 MEQ/L (ref 3.5–5.1)
PROTEIN UA: NEGATIVE MG/DL
RBC # BLD: 5.09 M/UL (ref 4.2–5.4)
RBC UA: ABNORMAL /HPF (ref 0–2)
SODIUM BLD-SCNC: 142 MEQ/L (ref 132–144)
SPECIFIC GRAVITY UA: 1.02 (ref 1–1.03)
URINE REFLEX TO CULTURE: YES
UROBILINOGEN, URINE: 0.2 E.U./DL
WBC # BLD: 8.6 K/UL (ref 4.8–10.8)
WBC UA: ABNORMAL /HPF (ref 0–5)

## 2018-04-19 PROCEDURE — 85027 COMPLETE CBC AUTOMATED: CPT

## 2018-04-19 PROCEDURE — 86850 RBC ANTIBODY SCREEN: CPT

## 2018-04-19 PROCEDURE — 87086 URINE CULTURE/COLONY COUNT: CPT

## 2018-04-19 PROCEDURE — 86900 BLOOD TYPING SEROLOGIC ABO: CPT

## 2018-04-19 PROCEDURE — 86901 BLOOD TYPING SEROLOGIC RH(D): CPT

## 2018-04-19 PROCEDURE — 81001 URINALYSIS AUTO W/SCOPE: CPT

## 2018-04-19 PROCEDURE — 80048 BASIC METABOLIC PNL TOTAL CA: CPT

## 2018-04-19 RX ORDER — LIDOCAINE HYDROCHLORIDE 10 MG/ML
1 INJECTION, SOLUTION EPIDURAL; INFILTRATION; INTRACAUDAL; PERINEURAL
Status: CANCELLED | OUTPATIENT
Start: 2018-04-19 | End: 2018-04-19

## 2018-04-19 RX ORDER — SODIUM CHLORIDE 0.9 % (FLUSH) 0.9 %
10 SYRINGE (ML) INJECTION EVERY 12 HOURS SCHEDULED
Status: CANCELLED | OUTPATIENT
Start: 2018-04-19

## 2018-04-19 RX ORDER — SODIUM CHLORIDE, SODIUM LACTATE, POTASSIUM CHLORIDE, CALCIUM CHLORIDE 600; 310; 30; 20 MG/100ML; MG/100ML; MG/100ML; MG/100ML
INJECTION, SOLUTION INTRAVENOUS CONTINUOUS
Status: CANCELLED | OUTPATIENT
Start: 2018-04-19

## 2018-04-19 RX ORDER — SODIUM CHLORIDE 0.9 % (FLUSH) 0.9 %
10 SYRINGE (ML) INJECTION PRN
Status: CANCELLED | OUTPATIENT
Start: 2018-04-19

## 2018-04-19 ASSESSMENT — ENCOUNTER SYMPTOMS
RESPIRATORY NEGATIVE: 1
GASTROINTESTINAL NEGATIVE: 1
EYES NEGATIVE: 1
BACK PAIN: 1

## 2018-04-20 LAB — URINE CULTURE, ROUTINE: NORMAL

## 2018-04-26 ENCOUNTER — APPOINTMENT (OUTPATIENT)
Dept: GENERAL RADIOLOGY | Age: 48
DRG: 302 | End: 2018-04-26
Attending: ORTHOPAEDIC SURGERY
Payer: COMMERCIAL

## 2018-04-26 ENCOUNTER — ANESTHESIA (OUTPATIENT)
Dept: OPERATING ROOM | Age: 48
DRG: 302 | End: 2018-04-26
Payer: COMMERCIAL

## 2018-04-26 ENCOUNTER — HOSPITAL ENCOUNTER (INPATIENT)
Age: 48
LOS: 2 days | Discharge: HOME HEALTH | DRG: 302 | End: 2018-04-28
Attending: ORTHOPAEDIC SURGERY | Admitting: ORTHOPAEDIC SURGERY
Payer: COMMERCIAL

## 2018-04-26 ENCOUNTER — PREP FOR PROCEDURE (OUTPATIENT)
Dept: ORTHOPEDIC SURGERY | Age: 48
End: 2018-04-26

## 2018-04-26 ENCOUNTER — ANESTHESIA EVENT (OUTPATIENT)
Dept: OPERATING ROOM | Age: 48
DRG: 302 | End: 2018-04-26
Payer: COMMERCIAL

## 2018-04-26 VITALS — SYSTOLIC BLOOD PRESSURE: 88 MMHG | OXYGEN SATURATION: 96 % | DIASTOLIC BLOOD PRESSURE: 51 MMHG

## 2018-04-26 DIAGNOSIS — Z96.651 STATUS POST TOTAL RIGHT KNEE REPLACEMENT: Primary | ICD-10-CM

## 2018-04-26 LAB
ALBUMIN SERPL-MCNC: 3.7 G/DL (ref 3.9–4.9)
ALP BLD-CCNC: 129 U/L (ref 40–130)
ALT SERPL-CCNC: 21 U/L (ref 0–33)
ANION GAP SERPL CALCULATED.3IONS-SCNC: 10 MEQ/L (ref 7–13)
AST SERPL-CCNC: 18 U/L (ref 0–35)
BASE EXCESS ARTERIAL: 1 (ref -3–3)
BILIRUB SERPL-MCNC: 0.6 MG/DL (ref 0–1.2)
BUN BLDV-MCNC: 21 MG/DL (ref 6–20)
CALCIUM IONIZED: 1.23 MMOL/L (ref 1.12–1.32)
CALCIUM SERPL-MCNC: 9.4 MG/DL (ref 8.6–10.2)
CHLORIDE BLD-SCNC: 103 MEQ/L (ref 98–107)
CO2: 27 MEQ/L (ref 22–29)
CREAT SERPL-MCNC: 0.76 MG/DL (ref 0.5–0.9)
GFR AFRICAN AMERICAN: >60
GFR AFRICAN AMERICAN: >60
GFR NON-AFRICAN AMERICAN: >60
GFR NON-AFRICAN AMERICAN: >60
GLOBULIN: 2.3 G/DL (ref 2.3–3.5)
GLUCOSE BLD-MCNC: 109 MG/DL (ref 60–115)
GLUCOSE BLD-MCNC: 117 MG/DL (ref 60–115)
GLUCOSE BLD-MCNC: 130 MG/DL (ref 60–115)
GLUCOSE BLD-MCNC: 134 MG/DL (ref 60–115)
GLUCOSE BLD-MCNC: 154 MG/DL (ref 74–109)
GLUCOSE BLD-MCNC: 160 MG/DL (ref 60–115)
HCO3 ARTERIAL: 26.7 MMOL/L (ref 21–29)
HEMOGLOBIN: 14.1 GM/DL (ref 12–16)
LACTATE: 2.18 MMOL/L (ref 0.4–2)
MAGNESIUM: 2.3 MG/DL (ref 1.7–2.3)
O2 SAT, ARTERIAL: 94 % (ref 93–100)
PCO2 ARTERIAL: 50 MM HG (ref 35–45)
PERFORMED ON: ABNORMAL
PERFORMED ON: NORMAL
PH ARTERIAL: 7.34 (ref 7.35–7.45)
PO2 ARTERIAL: 77 MM HG (ref 75–108)
POC CHLORIDE: 107 MEQ/L (ref 99–110)
POC CREATININE: 0.7 MG/DL (ref 0.6–1.1)
POC FIO2: 21
POC HEMATOCRIT: 41 % (ref 36–48)
POC POTASSIUM: 4.1 MEQ/L (ref 3.5–5.1)
POC SAMPLE TYPE: ABNORMAL
POC SODIUM: 141 MEQ/L (ref 136–145)
POTASSIUM SERPL-SCNC: 4.7 MEQ/L (ref 3.5–5.1)
SODIUM BLD-SCNC: 140 MEQ/L (ref 132–144)
TCO2 ARTERIAL: 28 (ref 22–29)
TOTAL PROTEIN: 6 G/DL (ref 6.4–8.1)
TROPONIN: <0.01 NG/ML (ref 0–0.01)

## 2018-04-26 PROCEDURE — 2580000003 HC RX 258: Performed by: NURSE PRACTITIONER

## 2018-04-26 PROCEDURE — 6360000002 HC RX W HCPCS: Performed by: NURSE ANESTHETIST, CERTIFIED REGISTERED

## 2018-04-26 PROCEDURE — 6360000002 HC RX W HCPCS: Performed by: ANESTHESIOLOGY

## 2018-04-26 PROCEDURE — 2720000010 HC SURG SUPPLY STERILE: Performed by: ORTHOPAEDIC SURGERY

## 2018-04-26 PROCEDURE — 2500000003 HC RX 250 WO HCPCS: Performed by: NURSE PRACTITIONER

## 2018-04-26 PROCEDURE — 82435 ASSAY OF BLOOD CHLORIDE: CPT

## 2018-04-26 PROCEDURE — 82565 ASSAY OF CREATININE: CPT

## 2018-04-26 PROCEDURE — G8987 SELF CARE CURRENT STATUS: HCPCS

## 2018-04-26 PROCEDURE — 73560 X-RAY EXAM OF KNEE 1 OR 2: CPT

## 2018-04-26 PROCEDURE — 6360000002 HC RX W HCPCS

## 2018-04-26 PROCEDURE — 97535 SELF CARE MNGMENT TRAINING: CPT

## 2018-04-26 PROCEDURE — 6360000002 HC RX W HCPCS: Performed by: ORTHOPAEDIC SURGERY

## 2018-04-26 PROCEDURE — 3600000004 HC SURGERY LEVEL 4 BASE: Performed by: ORTHOPAEDIC SURGERY

## 2018-04-26 PROCEDURE — 2580000003 HC RX 258: Performed by: ORTHOPAEDIC SURGERY

## 2018-04-26 PROCEDURE — 36600 WITHDRAWAL OF ARTERIAL BLOOD: CPT

## 2018-04-26 PROCEDURE — S0028 INJECTION, FAMOTIDINE, 20 MG: HCPCS | Performed by: NURSE PRACTITIONER

## 2018-04-26 PROCEDURE — 82330 ASSAY OF CALCIUM: CPT

## 2018-04-26 PROCEDURE — 93005 ELECTROCARDIOGRAM TRACING: CPT

## 2018-04-26 PROCEDURE — 1210000000 HC MED SURG R&B

## 2018-04-26 PROCEDURE — 36415 COLL VENOUS BLD VENIPUNCTURE: CPT

## 2018-04-26 PROCEDURE — 64445 NJX AA&/STRD SCIATIC NRV IMG: CPT | Performed by: NURSE ANESTHETIST, CERTIFIED REGISTERED

## 2018-04-26 PROCEDURE — 7100000001 HC PACU RECOVERY - ADDTL 15 MIN: Performed by: ORTHOPAEDIC SURGERY

## 2018-04-26 PROCEDURE — 0SRC0JA REPLACEMENT OF RIGHT KNEE JOINT WITH SYNTHETIC SUBSTITUTE, UNCEMENTED, OPEN APPROACH: ICD-10-PCS | Performed by: ORTHOPAEDIC SURGERY

## 2018-04-26 PROCEDURE — 82803 BLOOD GASES ANY COMBINATION: CPT

## 2018-04-26 PROCEDURE — 3700000001 HC ADD 15 MINUTES (ANESTHESIA): Performed by: ORTHOPAEDIC SURGERY

## 2018-04-26 PROCEDURE — 97167 OT EVAL HIGH COMPLEX 60 MIN: CPT

## 2018-04-26 PROCEDURE — 3700000000 HC ANESTHESIA ATTENDED CARE: Performed by: ORTHOPAEDIC SURGERY

## 2018-04-26 PROCEDURE — 6370000000 HC RX 637 (ALT 250 FOR IP): Performed by: NURSE PRACTITIONER

## 2018-04-26 PROCEDURE — 84484 ASSAY OF TROPONIN QUANT: CPT

## 2018-04-26 PROCEDURE — 83605 ASSAY OF LACTIC ACID: CPT

## 2018-04-26 PROCEDURE — 6360000002 HC RX W HCPCS: Performed by: NURSE PRACTITIONER

## 2018-04-26 PROCEDURE — C1776 JOINT DEVICE (IMPLANTABLE): HCPCS | Performed by: ORTHOPAEDIC SURGERY

## 2018-04-26 PROCEDURE — 97162 PT EVAL MOD COMPLEX 30 MIN: CPT

## 2018-04-26 PROCEDURE — 94664 DEMO&/EVAL PT USE INHALER: CPT

## 2018-04-26 PROCEDURE — 85014 HEMATOCRIT: CPT

## 2018-04-26 PROCEDURE — 82948 REAGENT STRIP/BLOOD GLUCOSE: CPT

## 2018-04-26 PROCEDURE — 2580000003 HC RX 258

## 2018-04-26 PROCEDURE — G8978 MOBILITY CURRENT STATUS: HCPCS

## 2018-04-26 PROCEDURE — 3E0T3BZ INTRODUCTION OF ANESTHETIC AGENT INTO PERIPHERAL NERVES AND PLEXI, PERCUTANEOUS APPROACH: ICD-10-PCS | Performed by: ANESTHESIOLOGY

## 2018-04-26 PROCEDURE — 83735 ASSAY OF MAGNESIUM: CPT

## 2018-04-26 PROCEDURE — 7100000000 HC PACU RECOVERY - FIRST 15 MIN: Performed by: ORTHOPAEDIC SURGERY

## 2018-04-26 PROCEDURE — 84132 ASSAY OF SERUM POTASSIUM: CPT

## 2018-04-26 PROCEDURE — 3600000014 HC SURGERY LEVEL 4 ADDTL 15MIN: Performed by: ORTHOPAEDIC SURGERY

## 2018-04-26 PROCEDURE — 80053 COMPREHEN METABOLIC PANEL: CPT

## 2018-04-26 PROCEDURE — G8979 MOBILITY GOAL STATUS: HCPCS

## 2018-04-26 PROCEDURE — 2500000003 HC RX 250 WO HCPCS: Performed by: ORTHOPAEDIC SURGERY

## 2018-04-26 PROCEDURE — G8988 SELF CARE GOAL STATUS: HCPCS

## 2018-04-26 PROCEDURE — 6370000000 HC RX 637 (ALT 250 FOR IP): Performed by: ORTHOPAEDIC SURGERY

## 2018-04-26 DEVICE — COMPONENT PAT DIA29MM THK9MM SUP INFERIOR KNEE TRITANIUM: Type: IMPLANTABLE DEVICE | Site: KNEE | Status: FUNCTIONAL

## 2018-04-26 DEVICE — IMPLANTABLE DEVICE: Type: IMPLANTABLE DEVICE | Site: KNEE | Status: FUNCTIONAL

## 2018-04-26 DEVICE — BASEPLATE TIB SZ 3 AP44MM ML67MM KNEE TRITANIUM 4 CRUCFRM: Type: IMPLANTABLE DEVICE | Site: KNEE | Status: FUNCTIONAL

## 2018-04-26 DEVICE — COMPONENT TOT KNEE CAPPED STD STRYKNEES] STRYKER CORP]: Type: IMPLANTABLE DEVICE | Site: KNEE | Status: FUNCTIONAL

## 2018-04-26 RX ORDER — ONDANSETRON 4 MG/1
4 TABLET, FILM COATED ORAL EVERY 6 HOURS PRN
Status: DISCONTINUED | OUTPATIENT
Start: 2018-04-26 | End: 2018-04-28 | Stop reason: HOSPADM

## 2018-04-26 RX ORDER — PROPOFOL 10 MG/ML
INJECTION, EMULSION INTRAVENOUS CONTINUOUS PRN
Status: DISCONTINUED | OUTPATIENT
Start: 2018-04-26 | End: 2018-04-26 | Stop reason: SDUPTHER

## 2018-04-26 RX ORDER — MORPHINE SULFATE 2 MG/ML
2 INJECTION, SOLUTION INTRAMUSCULAR; INTRAVENOUS
Status: CANCELLED | OUTPATIENT
Start: 2018-04-26

## 2018-04-26 RX ORDER — SODIUM CHLORIDE, SODIUM LACTATE, POTASSIUM CHLORIDE, CALCIUM CHLORIDE 600; 310; 30; 20 MG/100ML; MG/100ML; MG/100ML; MG/100ML
INJECTION, SOLUTION INTRAVENOUS CONTINUOUS
Status: DISCONTINUED | OUTPATIENT
Start: 2018-04-26 | End: 2018-04-26

## 2018-04-26 RX ORDER — TRAMADOL HYDROCHLORIDE 50 MG/1
50 TABLET ORAL EVERY 6 HOURS PRN
Status: DISCONTINUED | OUTPATIENT
Start: 2018-04-26 | End: 2018-04-28 | Stop reason: HOSPADM

## 2018-04-26 RX ORDER — METOCLOPRAMIDE HYDROCHLORIDE 5 MG/ML
10 INJECTION INTRAMUSCULAR; INTRAVENOUS ONCE
Status: COMPLETED | OUTPATIENT
Start: 2018-04-26 | End: 2018-04-26

## 2018-04-26 RX ORDER — SODIUM CHLORIDE 9 MG/ML
INJECTION, SOLUTION INTRAVENOUS CONTINUOUS
Status: DISCONTINUED | OUTPATIENT
Start: 2018-04-26 | End: 2018-04-28 | Stop reason: HOSPADM

## 2018-04-26 RX ORDER — IPRATROPIUM BROMIDE AND ALBUTEROL SULFATE 2.5; .5 MG/3ML; MG/3ML
1 SOLUTION RESPIRATORY (INHALATION) EVERY 4 HOURS PRN
Status: DISCONTINUED | OUTPATIENT
Start: 2018-04-26 | End: 2018-04-28 | Stop reason: HOSPADM

## 2018-04-26 RX ORDER — ACETAMINOPHEN 325 MG/1
650 TABLET ORAL EVERY 6 HOURS
Status: DISCONTINUED | OUTPATIENT
Start: 2018-04-26 | End: 2018-04-28 | Stop reason: HOSPADM

## 2018-04-26 RX ORDER — MORPHINE SULFATE 2 MG/ML
2 INJECTION, SOLUTION INTRAMUSCULAR; INTRAVENOUS
Status: ACTIVE | OUTPATIENT
Start: 2018-04-26 | End: 2018-04-27

## 2018-04-26 RX ORDER — SODIUM CHLORIDE 0.9 % (FLUSH) 0.9 %
10 SYRINGE (ML) INJECTION PRN
Status: DISCONTINUED | OUTPATIENT
Start: 2018-04-26 | End: 2018-04-28 | Stop reason: HOSPADM

## 2018-04-26 RX ORDER — DOCUSATE SODIUM 100 MG/1
100 CAPSULE, LIQUID FILLED ORAL 2 TIMES DAILY
Status: CANCELLED | OUTPATIENT
Start: 2018-04-26

## 2018-04-26 RX ORDER — KETOROLAC TROMETHAMINE 30 MG/ML
30 INJECTION, SOLUTION INTRAMUSCULAR; INTRAVENOUS EVERY 6 HOURS
Status: CANCELLED | OUTPATIENT
Start: 2018-04-26 | End: 2018-04-26

## 2018-04-26 RX ORDER — SODIUM CHLORIDE 0.9 % (FLUSH) 0.9 %
10 SYRINGE (ML) INJECTION PRN
Status: CANCELLED | OUTPATIENT
Start: 2018-04-26

## 2018-04-26 RX ORDER — OMEPRAZOLE 20 MG/1
20 CAPSULE, DELAYED RELEASE ORAL DAILY
Status: DISCONTINUED | OUTPATIENT
Start: 2018-04-26 | End: 2018-04-26 | Stop reason: CLARIF

## 2018-04-26 RX ORDER — ONDANSETRON 2 MG/ML
4 INJECTION INTRAMUSCULAR; INTRAVENOUS EVERY 6 HOURS PRN
Status: CANCELLED | OUTPATIENT
Start: 2018-04-26

## 2018-04-26 RX ORDER — SODIUM CHLORIDE 9 MG/ML
INJECTION, SOLUTION INTRAVENOUS CONTINUOUS
Status: CANCELLED | OUTPATIENT
Start: 2018-04-26

## 2018-04-26 RX ORDER — NICOTINE POLACRILEX 4 MG
15 LOZENGE BUCCAL PRN
Status: DISCONTINUED | OUTPATIENT
Start: 2018-04-26 | End: 2018-04-28 | Stop reason: HOSPADM

## 2018-04-26 RX ORDER — SODIUM CHLORIDE 0.9 % (FLUSH) 0.9 %
10 SYRINGE (ML) INJECTION EVERY 12 HOURS SCHEDULED
Status: DISCONTINUED | OUTPATIENT
Start: 2018-04-26 | End: 2018-04-26 | Stop reason: HOSPADM

## 2018-04-26 RX ORDER — DEXTROSE MONOHYDRATE 25 G/50ML
12.5 INJECTION, SOLUTION INTRAVENOUS PRN
Status: DISCONTINUED | OUTPATIENT
Start: 2018-04-26 | End: 2018-04-28 | Stop reason: HOSPADM

## 2018-04-26 RX ORDER — MORPHINE SULFATE 2 MG/ML
4 INJECTION, SOLUTION INTRAMUSCULAR; INTRAVENOUS
Status: CANCELLED | OUTPATIENT
Start: 2018-04-26

## 2018-04-26 RX ORDER — ONDANSETRON 2 MG/ML
4 INJECTION INTRAMUSCULAR; INTRAVENOUS EVERY 6 HOURS PRN
Status: DISCONTINUED | OUTPATIENT
Start: 2018-04-26 | End: 2018-04-28 | Stop reason: HOSPADM

## 2018-04-26 RX ORDER — ROPIVACAINE HYDROCHLORIDE 5 MG/ML
INJECTION, SOLUTION EPIDURAL; INFILTRATION; PERINEURAL PRN
Status: DISCONTINUED | OUTPATIENT
Start: 2018-04-26 | End: 2018-04-26 | Stop reason: SDUPTHER

## 2018-04-26 RX ORDER — OXYCODONE HCL 10 MG/1
10 TABLET, FILM COATED, EXTENDED RELEASE ORAL ONCE
Status: COMPLETED | OUTPATIENT
Start: 2018-04-26 | End: 2018-04-26

## 2018-04-26 RX ORDER — LEVOTHYROXINE SODIUM 0.1 MG/1
200 TABLET ORAL DAILY
Status: DISCONTINUED | OUTPATIENT
Start: 2018-04-26 | End: 2018-04-28 | Stop reason: HOSPADM

## 2018-04-26 RX ORDER — DEXTROSE MONOHYDRATE 50 MG/ML
100 INJECTION, SOLUTION INTRAVENOUS PRN
Status: DISCONTINUED | OUTPATIENT
Start: 2018-04-26 | End: 2018-04-28 | Stop reason: HOSPADM

## 2018-04-26 RX ORDER — MORPHINE SULFATE 4 MG/ML
4 INJECTION, SOLUTION INTRAMUSCULAR; INTRAVENOUS
Status: ACTIVE | OUTPATIENT
Start: 2018-04-26 | End: 2018-04-27

## 2018-04-26 RX ORDER — BISACODYL 10 MG
10 SUPPOSITORY, RECTAL RECTAL DAILY PRN
Status: DISCONTINUED | OUTPATIENT
Start: 2018-04-26 | End: 2018-04-28 | Stop reason: HOSPADM

## 2018-04-26 RX ORDER — BISACODYL 10 MG
10 SUPPOSITORY, RECTAL RECTAL DAILY PRN
Status: CANCELLED | OUTPATIENT
Start: 2018-04-26

## 2018-04-26 RX ORDER — MIDAZOLAM HYDROCHLORIDE 1 MG/ML
INJECTION INTRAMUSCULAR; INTRAVENOUS PRN
Status: DISCONTINUED | OUTPATIENT
Start: 2018-04-26 | End: 2018-04-26 | Stop reason: SDUPTHER

## 2018-04-26 RX ORDER — SENNA AND DOCUSATE SODIUM 50; 8.6 MG/1; MG/1
1 TABLET, FILM COATED ORAL DAILY
Status: CANCELLED | OUTPATIENT
Start: 2018-04-26

## 2018-04-26 RX ORDER — METOCLOPRAMIDE HYDROCHLORIDE 5 MG/ML
10 INJECTION INTRAMUSCULAR; INTRAVENOUS
Status: COMPLETED | OUTPATIENT
Start: 2018-04-26 | End: 2018-04-26

## 2018-04-26 RX ORDER — IPRATROPIUM BROMIDE AND ALBUTEROL SULFATE 2.5; .5 MG/3ML; MG/3ML
1 SOLUTION RESPIRATORY (INHALATION) EVERY 4 HOURS
Status: DISCONTINUED | OUTPATIENT
Start: 2018-04-26 | End: 2018-04-26

## 2018-04-26 RX ORDER — FENTANYL CITRATE 50 UG/ML
50 INJECTION, SOLUTION INTRAMUSCULAR; INTRAVENOUS EVERY 10 MIN PRN
Status: DISCONTINUED | OUTPATIENT
Start: 2018-04-26 | End: 2018-04-26 | Stop reason: HOSPADM

## 2018-04-26 RX ORDER — DOCUSATE SODIUM 100 MG/1
100 CAPSULE, LIQUID FILLED ORAL 2 TIMES DAILY
Status: DISCONTINUED | OUTPATIENT
Start: 2018-04-26 | End: 2018-04-28 | Stop reason: HOSPADM

## 2018-04-26 RX ORDER — ACETAMINOPHEN 325 MG/1
650 TABLET ORAL EVERY 6 HOURS
Status: CANCELLED | OUTPATIENT
Start: 2018-04-26

## 2018-04-26 RX ORDER — SODIUM CHLORIDE 0.9 % (FLUSH) 0.9 %
10 SYRINGE (ML) INJECTION EVERY 12 HOURS SCHEDULED
Status: DISCONTINUED | OUTPATIENT
Start: 2018-04-26 | End: 2018-04-28 | Stop reason: HOSPADM

## 2018-04-26 RX ORDER — DEXAMETHASONE SODIUM PHOSPHATE 10 MG/ML
INJECTION INTRAMUSCULAR; INTRAVENOUS PRN
Status: DISCONTINUED | OUTPATIENT
Start: 2018-04-26 | End: 2018-04-26 | Stop reason: SDUPTHER

## 2018-04-26 RX ORDER — ONDANSETRON 2 MG/ML
4 INJECTION INTRAMUSCULAR; INTRAVENOUS
Status: DISCONTINUED | OUTPATIENT
Start: 2018-04-26 | End: 2018-04-26 | Stop reason: HOSPADM

## 2018-04-26 RX ORDER — OXYCODONE HYDROCHLORIDE AND ACETAMINOPHEN 5; 325 MG/1; MG/1
1 TABLET ORAL EVERY 4 HOURS PRN
Status: DISCONTINUED | OUTPATIENT
Start: 2018-04-26 | End: 2018-04-28 | Stop reason: HOSPADM

## 2018-04-26 RX ORDER — ACETAMINOPHEN 325 MG/1
650 TABLET ORAL ONCE
Status: COMPLETED | OUTPATIENT
Start: 2018-04-26 | End: 2018-04-26

## 2018-04-26 RX ORDER — TRAMADOL HYDROCHLORIDE 50 MG/1
50 TABLET ORAL EVERY 6 HOURS PRN
Status: CANCELLED | OUTPATIENT
Start: 2018-04-26

## 2018-04-26 RX ORDER — LIDOCAINE HYDROCHLORIDE 10 MG/ML
1 INJECTION, SOLUTION EPIDURAL; INFILTRATION; INTRACAUDAL; PERINEURAL
Status: COMPLETED | OUTPATIENT
Start: 2018-04-26 | End: 2018-04-26

## 2018-04-26 RX ORDER — KETOROLAC TROMETHAMINE 30 MG/ML
30 INJECTION, SOLUTION INTRAMUSCULAR; INTRAVENOUS EVERY 6 HOURS
Status: COMPLETED | OUTPATIENT
Start: 2018-04-26 | End: 2018-04-26

## 2018-04-26 RX ORDER — HYDROCODONE BITARTRATE AND ACETAMINOPHEN 5; 325 MG/1; MG/1
1 TABLET ORAL PRN
Status: DISCONTINUED | OUTPATIENT
Start: 2018-04-26 | End: 2018-04-26 | Stop reason: HOSPADM

## 2018-04-26 RX ORDER — HYDROCODONE BITARTRATE AND ACETAMINOPHEN 5; 325 MG/1; MG/1
2 TABLET ORAL PRN
Status: DISCONTINUED | OUTPATIENT
Start: 2018-04-26 | End: 2018-04-26 | Stop reason: HOSPADM

## 2018-04-26 RX ORDER — SODIUM CHLORIDE 0.9 % (FLUSH) 0.9 %
10 SYRINGE (ML) INJECTION PRN
Status: DISCONTINUED | OUTPATIENT
Start: 2018-04-26 | End: 2018-04-26 | Stop reason: HOSPADM

## 2018-04-26 RX ORDER — PANTOPRAZOLE SODIUM 40 MG/1
40 TABLET, DELAYED RELEASE ORAL
Status: DISCONTINUED | OUTPATIENT
Start: 2018-04-26 | End: 2018-04-28 | Stop reason: HOSPADM

## 2018-04-26 RX ORDER — MEPERIDINE HYDROCHLORIDE 25 MG/ML
12.5 INJECTION INTRAMUSCULAR; INTRAVENOUS; SUBCUTANEOUS EVERY 5 MIN PRN
Status: DISCONTINUED | OUTPATIENT
Start: 2018-04-26 | End: 2018-04-26 | Stop reason: HOSPADM

## 2018-04-26 RX ORDER — SODIUM CHLORIDE 0.9 % (FLUSH) 0.9 %
10 SYRINGE (ML) INJECTION EVERY 12 HOURS SCHEDULED
Status: CANCELLED | OUTPATIENT
Start: 2018-04-26

## 2018-04-26 RX ORDER — DIPHENHYDRAMINE HYDROCHLORIDE 50 MG/ML
12.5 INJECTION INTRAMUSCULAR; INTRAVENOUS
Status: DISCONTINUED | OUTPATIENT
Start: 2018-04-26 | End: 2018-04-26 | Stop reason: HOSPADM

## 2018-04-26 RX ORDER — SENNA AND DOCUSATE SODIUM 50; 8.6 MG/1; MG/1
1 TABLET, FILM COATED ORAL DAILY
Status: DISCONTINUED | OUTPATIENT
Start: 2018-04-26 | End: 2018-04-28 | Stop reason: HOSPADM

## 2018-04-26 RX ADMIN — PROPOFOL 100 MCG/KG/MIN: 10 INJECTION, EMULSION INTRAVENOUS at 09:36

## 2018-04-26 RX ADMIN — OXYCODONE HYDROCHLORIDE 10 MG: 10 TABLET, FILM COATED, EXTENDED RELEASE ORAL at 08:19

## 2018-04-26 RX ADMIN — TRANEXAMIC ACID 1000 MG: 100 INJECTION, SOLUTION INTRAVENOUS at 09:40

## 2018-04-26 RX ADMIN — KETOROLAC TROMETHAMINE 30 MG: 30 INJECTION, SOLUTION INTRAMUSCULAR at 13:59

## 2018-04-26 RX ADMIN — CEFAZOLIN SODIUM 2 G: 2 SOLUTION INTRAVENOUS at 09:29

## 2018-04-26 RX ADMIN — ONDANSETRON HYDROCHLORIDE 4 MG: 4 TABLET, FILM COATED ORAL at 14:59

## 2018-04-26 RX ADMIN — METOCLOPRAMIDE 10 MG: 5 INJECTION, SOLUTION INTRAMUSCULAR; INTRAVENOUS at 17:49

## 2018-04-26 RX ADMIN — PHENYLEPHRINE HYDROCHLORIDE 100 MCG: 10 INJECTION INTRAVENOUS at 10:15

## 2018-04-26 RX ADMIN — ROPIVACAINE HYDROCHLORIDE 15 ML: 5 INJECTION, SOLUTION EPIDURAL; INFILTRATION; PERINEURAL at 09:24

## 2018-04-26 RX ADMIN — METOCLOPRAMIDE 10 MG: 5 INJECTION, SOLUTION INTRAMUSCULAR; INTRAVENOUS at 11:50

## 2018-04-26 RX ADMIN — LIDOCAINE HYDROCHLORIDE 0.1 ML: 10 INJECTION, SOLUTION EPIDURAL; INFILTRATION; INTRACAUDAL; PERINEURAL at 08:42

## 2018-04-26 RX ADMIN — SODIUM CHLORIDE: 9 INJECTION, SOLUTION INTRAVENOUS at 14:00

## 2018-04-26 RX ADMIN — FAMOTIDINE 20 MG: 10 INJECTION INTRAVENOUS at 17:49

## 2018-04-26 RX ADMIN — DEXAMETHASONE SODIUM PHOSPHATE 5 MG: 10 INJECTION INTRAMUSCULAR; INTRAVENOUS at 09:24

## 2018-04-26 RX ADMIN — SODIUM CHLORIDE, POTASSIUM CHLORIDE, SODIUM LACTATE AND CALCIUM CHLORIDE: 600; 310; 30; 20 INJECTION, SOLUTION INTRAVENOUS at 08:44

## 2018-04-26 RX ADMIN — MIDAZOLAM HYDROCHLORIDE 4 MG: 1 INJECTION, SOLUTION INTRAMUSCULAR; INTRAVENOUS at 09:15

## 2018-04-26 RX ADMIN — ACETAMINOPHEN 650 MG: 325 TABLET ORAL at 08:19

## 2018-04-26 RX ADMIN — PHENYLEPHRINE HYDROCHLORIDE 100 MCG: 10 INJECTION INTRAVENOUS at 10:31

## 2018-04-26 RX ADMIN — TRANEXAMIC ACID 1000 MG: 100 INJECTION, SOLUTION INTRAVENOUS at 11:40

## 2018-04-26 RX ADMIN — KETOROLAC TROMETHAMINE 30 MG: 30 INJECTION, SOLUTION INTRAMUSCULAR at 19:45

## 2018-04-26 RX ADMIN — CEFAZOLIN SODIUM 2 G: 2 SOLUTION INTRAVENOUS at 17:49

## 2018-04-26 RX ADMIN — PHENYLEPHRINE HYDROCHLORIDE 100 MCG: 10 INJECTION INTRAVENOUS at 10:25

## 2018-04-26 RX ADMIN — SODIUM CHLORIDE, POTASSIUM CHLORIDE, SODIUM LACTATE AND CALCIUM CHLORIDE: 600; 310; 30; 20 INJECTION, SOLUTION INTRAVENOUS at 10:25

## 2018-04-26 ASSESSMENT — PULMONARY FUNCTION TESTS
PIF_VALUE: 0
PIF_VALUE: 1
PIF_VALUE: 0
PIF_VALUE: 1
PIF_VALUE: 0
PIF_VALUE: 1
PIF_VALUE: 0
PIF_VALUE: 0
PIF_VALUE: 1
PIF_VALUE: 0
PIF_VALUE: 0
PIF_VALUE: 1
PIF_VALUE: 0

## 2018-04-26 ASSESSMENT — PAIN SCALES - GENERAL
PAINLEVEL_OUTOF10: 2
PAINLEVEL_OUTOF10: 5
PAINLEVEL_OUTOF10: 6
PAINLEVEL_OUTOF10: 0
PAINLEVEL_OUTOF10: 2
PAINLEVEL_OUTOF10: 6
PAINLEVEL_OUTOF10: 0
PAINLEVEL_OUTOF10: 0

## 2018-04-26 ASSESSMENT — PAIN - FUNCTIONAL ASSESSMENT: PAIN_FUNCTIONAL_ASSESSMENT: 0-10

## 2018-04-27 LAB
ANION GAP SERPL CALCULATED.3IONS-SCNC: 16 MEQ/L (ref 7–13)
BUN BLDV-MCNC: 19 MG/DL (ref 6–20)
CALCIUM SERPL-MCNC: 8.8 MG/DL (ref 8.6–10.2)
CHLORIDE BLD-SCNC: 104 MEQ/L (ref 98–107)
CO2: 22 MEQ/L (ref 22–29)
CREAT SERPL-MCNC: 0.73 MG/DL (ref 0.5–0.9)
GFR AFRICAN AMERICAN: >60
GFR NON-AFRICAN AMERICAN: >60
GLUCOSE BLD-MCNC: 110 MG/DL (ref 74–109)
GLUCOSE BLD-MCNC: 127 MG/DL (ref 60–115)
GLUCOSE BLD-MCNC: 130 MG/DL (ref 60–115)
GLUCOSE BLD-MCNC: 133 MG/DL (ref 60–115)
GLUCOSE BLD-MCNC: 157 MG/DL (ref 60–115)
HCT VFR BLD CALC: 41.2 % (ref 37–47)
HEMOGLOBIN: 13.8 G/DL (ref 12–16)
MCH RBC QN AUTO: 29.1 PG (ref 27–31.3)
MCHC RBC AUTO-ENTMCNC: 33.5 % (ref 33–37)
MCV RBC AUTO: 86.9 FL (ref 82–100)
PDW BLD-RTO: 14.7 % (ref 11.5–14.5)
PERFORMED ON: ABNORMAL
PLATELET # BLD: 245 K/UL (ref 130–400)
POTASSIUM REFLEX MAGNESIUM: 4.7 MEQ/L (ref 3.5–5.1)
RBC # BLD: 4.74 M/UL (ref 4.2–5.4)
SODIUM BLD-SCNC: 142 MEQ/L (ref 132–144)
WBC # BLD: 13 K/UL (ref 4.8–10.8)

## 2018-04-27 PROCEDURE — 97116 GAIT TRAINING THERAPY: CPT

## 2018-04-27 PROCEDURE — 97535 SELF CARE MNGMENT TRAINING: CPT

## 2018-04-27 PROCEDURE — 93010 ELECTROCARDIOGRAM REPORT: CPT | Performed by: INTERNAL MEDICINE

## 2018-04-27 PROCEDURE — 6370000000 HC RX 637 (ALT 250 FOR IP): Performed by: ORTHOPAEDIC SURGERY

## 2018-04-27 PROCEDURE — 36415 COLL VENOUS BLD VENIPUNCTURE: CPT

## 2018-04-27 PROCEDURE — 97110 THERAPEUTIC EXERCISES: CPT

## 2018-04-27 PROCEDURE — 6370000000 HC RX 637 (ALT 250 FOR IP): Performed by: NURSE PRACTITIONER

## 2018-04-27 PROCEDURE — 6370000000 HC RX 637 (ALT 250 FOR IP): Performed by: INTERNAL MEDICINE

## 2018-04-27 PROCEDURE — 6360000002 HC RX W HCPCS: Performed by: NURSE PRACTITIONER

## 2018-04-27 PROCEDURE — 99254 IP/OBS CNSLTJ NEW/EST MOD 60: CPT | Performed by: INTERNAL MEDICINE

## 2018-04-27 PROCEDURE — 1210000000 HC MED SURG R&B

## 2018-04-27 PROCEDURE — 2580000003 HC RX 258: Performed by: ORTHOPAEDIC SURGERY

## 2018-04-27 PROCEDURE — 6360000002 HC RX W HCPCS: Performed by: ORTHOPAEDIC SURGERY

## 2018-04-27 PROCEDURE — 80048 BASIC METABOLIC PNL TOTAL CA: CPT

## 2018-04-27 PROCEDURE — 85027 COMPLETE CBC AUTOMATED: CPT

## 2018-04-27 RX ORDER — ATORVASTATIN CALCIUM 10 MG/1
10 TABLET, FILM COATED ORAL NIGHTLY
Status: DISCONTINUED | OUTPATIENT
Start: 2018-04-27 | End: 2018-04-28 | Stop reason: HOSPADM

## 2018-04-27 RX ORDER — ACETAMINOPHEN 325 MG/1
650 TABLET ORAL EVERY 6 HOURS
Qty: 120 TABLET | Refills: 0 | Status: SHIPPED | OUTPATIENT
Start: 2018-04-27

## 2018-04-27 RX ORDER — TRAMADOL HYDROCHLORIDE 50 MG/1
50 TABLET ORAL EVERY 6 HOURS PRN
Qty: 28 TABLET | Refills: 0 | Status: SHIPPED | OUTPATIENT
Start: 2018-04-27 | End: 2018-05-04

## 2018-04-27 RX ORDER — ASPIRIN 81 MG/1
81 TABLET ORAL 2 TIMES DAILY
Qty: 60 TABLET | Refills: 0 | Status: SHIPPED | OUTPATIENT
Start: 2018-04-27

## 2018-04-27 RX ORDER — OXYCODONE HYDROCHLORIDE AND ACETAMINOPHEN 5; 325 MG/1; MG/1
1 TABLET ORAL EVERY 4 HOURS PRN
Qty: 40 TABLET | Refills: 0 | Status: SHIPPED | OUTPATIENT
Start: 2018-04-27 | End: 2018-05-04

## 2018-04-27 RX ADMIN — DOCUSATE SODIUM 100 MG: 100 CAPSULE, LIQUID FILLED ORAL at 20:14

## 2018-04-27 RX ADMIN — SODIUM CHLORIDE: 9 INJECTION, SOLUTION INTRAVENOUS at 01:08

## 2018-04-27 RX ADMIN — TRAMADOL HYDROCHLORIDE 50 MG: 50 TABLET, FILM COATED ORAL at 11:00

## 2018-04-27 RX ADMIN — TRAMADOL HYDROCHLORIDE 50 MG: 50 TABLET, FILM COATED ORAL at 04:45

## 2018-04-27 RX ADMIN — ONDANSETRON HYDROCHLORIDE 4 MG: 4 TABLET, FILM COATED ORAL at 11:00

## 2018-04-27 RX ADMIN — SENNOSIDES AND DOCUSATE SODIUM 1 TABLET: 8.6; 5 TABLET ORAL at 08:22

## 2018-04-27 RX ADMIN — LEVOTHYROXINE SODIUM 200 MCG: 100 TABLET ORAL at 06:18

## 2018-04-27 RX ADMIN — PANTOPRAZOLE SODIUM 40 MG: 40 TABLET, DELAYED RELEASE ORAL at 06:18

## 2018-04-27 RX ADMIN — ATORVASTATIN CALCIUM 10 MG: 10 TABLET, FILM COATED ORAL at 20:13

## 2018-04-27 RX ADMIN — ONDANSETRON HYDROCHLORIDE 4 MG: 4 TABLET, FILM COATED ORAL at 20:13

## 2018-04-27 RX ADMIN — CEFAZOLIN SODIUM 2 G: 2 SOLUTION INTRAVENOUS at 01:09

## 2018-04-27 RX ADMIN — ACETAMINOPHEN 650 MG: 325 TABLET ORAL at 20:13

## 2018-04-27 RX ADMIN — OXYCODONE HYDROCHLORIDE AND ACETAMINOPHEN 1 TABLET: 5; 325 TABLET ORAL at 20:13

## 2018-04-27 RX ADMIN — Medication 10 ML: at 21:00

## 2018-04-27 RX ADMIN — DOCUSATE SODIUM 100 MG: 100 CAPSULE, LIQUID FILLED ORAL at 08:21

## 2018-04-27 RX ADMIN — MAGNESIUM HYDROXIDE 30 ML: 400 SUSPENSION ORAL at 20:14

## 2018-04-27 RX ADMIN — ACETAMINOPHEN 650 MG: 325 TABLET ORAL at 08:21

## 2018-04-27 RX ADMIN — OXYCODONE HYDROCHLORIDE AND ACETAMINOPHEN 1 TABLET: 5; 325 TABLET ORAL at 15:32

## 2018-04-27 ASSESSMENT — PAIN DESCRIPTION - ORIENTATION
ORIENTATION: RIGHT

## 2018-04-27 ASSESSMENT — ENCOUNTER SYMPTOMS
COUGH: 0
WHEEZING: 0
SHORTNESS OF BREATH: 0
NAUSEA: 0
ABDOMINAL PAIN: 0
HEMOPTYSIS: 0
VOMITING: 0
ORTHOPNEA: 0
GASTROINTESTINAL NEGATIVE: 1
DIARRHEA: 0
EYES NEGATIVE: 1

## 2018-04-27 ASSESSMENT — PAIN DESCRIPTION - DESCRIPTORS
DESCRIPTORS: ACHING
DESCRIPTORS: ACHING
DESCRIPTORS: ACHING;BURNING

## 2018-04-27 ASSESSMENT — PAIN SCALES - GENERAL
PAINLEVEL_OUTOF10: 0
PAINLEVEL_OUTOF10: 0
PAINLEVEL_OUTOF10: 7
PAINLEVEL_OUTOF10: 4
PAINLEVEL_OUTOF10: 5
PAINLEVEL_OUTOF10: 0
PAINLEVEL_OUTOF10: 4
PAINLEVEL_OUTOF10: 6
PAINLEVEL_OUTOF10: 4
PAINLEVEL_OUTOF10: 1

## 2018-04-27 ASSESSMENT — PAIN DESCRIPTION - PAIN TYPE
TYPE: SURGICAL PAIN;ACUTE PAIN
TYPE: ACUTE PAIN;SURGICAL PAIN
TYPE: SURGICAL PAIN
TYPE: SURGICAL PAIN

## 2018-04-27 ASSESSMENT — PAIN DESCRIPTION - LOCATION
LOCATION: KNEE

## 2018-04-27 ASSESSMENT — PAIN DESCRIPTION - FREQUENCY: FREQUENCY: CONTINUOUS

## 2018-04-27 ASSESSMENT — PAIN DESCRIPTION - PROGRESSION: CLINICAL_PROGRESSION: GRADUALLY WORSENING

## 2018-04-27 ASSESSMENT — PAIN DESCRIPTION - ONSET: ONSET: ON-GOING

## 2018-04-28 VITALS
DIASTOLIC BLOOD PRESSURE: 63 MMHG | BODY MASS INDEX: 42.88 KG/M2 | SYSTOLIC BLOOD PRESSURE: 110 MMHG | RESPIRATION RATE: 16 BRPM | TEMPERATURE: 98.1 F | OXYGEN SATURATION: 95 % | HEART RATE: 62 BPM | HEIGHT: 65 IN | WEIGHT: 257.38 LBS

## 2018-04-28 LAB
ANION GAP SERPL CALCULATED.3IONS-SCNC: 9 MEQ/L (ref 7–13)
BILIRUBIN URINE: NEGATIVE
BLOOD, URINE: NEGATIVE
BUN BLDV-MCNC: 17 MG/DL (ref 6–20)
CALCIUM SERPL-MCNC: 8.7 MG/DL (ref 8.6–10.2)
CHLORIDE BLD-SCNC: 98 MEQ/L (ref 98–107)
CLARITY: CLEAR
CO2: 30 MEQ/L (ref 22–29)
COLOR: ABNORMAL
CREAT SERPL-MCNC: 0.72 MG/DL (ref 0.5–0.9)
EKG ATRIAL RATE: 58 BPM
EKG ATRIAL RATE: 63 BPM
EKG P AXIS: 45 DEGREES
EKG P AXIS: 47 DEGREES
EKG P-R INTERVAL: 144 MS
EKG P-R INTERVAL: 182 MS
EKG Q-T INTERVAL: 392 MS
EKG Q-T INTERVAL: 434 MS
EKG QRS DURATION: 76 MS
EKG QRS DURATION: 82 MS
EKG QTC CALCULATION (BAZETT): 401 MS
EKG QTC CALCULATION (BAZETT): 426 MS
EKG R AXIS: -18 DEGREES
EKG R AXIS: -21 DEGREES
EKG T AXIS: 10 DEGREES
EKG T AXIS: 29 DEGREES
EKG VENTRICULAR RATE: 58 BPM
EKG VENTRICULAR RATE: 63 BPM
GFR AFRICAN AMERICAN: >60
GFR NON-AFRICAN AMERICAN: >60
GLUCOSE BLD-MCNC: 126 MG/DL (ref 60–115)
GLUCOSE BLD-MCNC: 126 MG/DL (ref 60–115)
GLUCOSE BLD-MCNC: 129 MG/DL (ref 74–109)
GLUCOSE URINE: NEGATIVE MG/DL
HCT VFR BLD CALC: 36.8 % (ref 37–47)
HEMOGLOBIN: 12.3 G/DL (ref 12–16)
KETONES, URINE: NEGATIVE MG/DL
LEUKOCYTE ESTERASE, URINE: NEGATIVE
LV EF: 60 %
LVEF MODALITY: NORMAL
MCH RBC QN AUTO: 28.9 PG (ref 27–31.3)
MCHC RBC AUTO-ENTMCNC: 33.3 % (ref 33–37)
MCV RBC AUTO: 86.6 FL (ref 82–100)
NITRITE, URINE: NEGATIVE
PDW BLD-RTO: 14.8 % (ref 11.5–14.5)
PERFORMED ON: ABNORMAL
PERFORMED ON: ABNORMAL
PH UA: 5.5 (ref 5–9)
PLATELET # BLD: 241 K/UL (ref 130–400)
POTASSIUM SERPL-SCNC: 4.5 MEQ/L (ref 3.5–5.1)
PROTEIN UA: ABNORMAL MG/DL
RBC # BLD: 4.25 M/UL (ref 4.2–5.4)
SODIUM BLD-SCNC: 137 MEQ/L (ref 132–144)
SPECIFIC GRAVITY UA: 1.04 (ref 1–1.03)
URINE REFLEX TO CULTURE: ABNORMAL
UROBILINOGEN, URINE: 0.2 E.U./DL
WBC # BLD: 11.3 K/UL (ref 4.8–10.8)

## 2018-04-28 PROCEDURE — 97110 THERAPEUTIC EXERCISES: CPT

## 2018-04-28 PROCEDURE — 93306 TTE W/DOPPLER COMPLETE: CPT

## 2018-04-28 PROCEDURE — 93005 ELECTROCARDIOGRAM TRACING: CPT

## 2018-04-28 PROCEDURE — 81003 URINALYSIS AUTO W/O SCOPE: CPT

## 2018-04-28 PROCEDURE — 6370000000 HC RX 637 (ALT 250 FOR IP): Performed by: INTERNAL MEDICINE

## 2018-04-28 PROCEDURE — 6370000000 HC RX 637 (ALT 250 FOR IP): Performed by: NURSE PRACTITIONER

## 2018-04-28 PROCEDURE — 85027 COMPLETE CBC AUTOMATED: CPT

## 2018-04-28 PROCEDURE — 6370000000 HC RX 637 (ALT 250 FOR IP): Performed by: ORTHOPAEDIC SURGERY

## 2018-04-28 PROCEDURE — 97535 SELF CARE MNGMENT TRAINING: CPT

## 2018-04-28 PROCEDURE — 80048 BASIC METABOLIC PNL TOTAL CA: CPT

## 2018-04-28 PROCEDURE — 2500000003 HC RX 250 WO HCPCS

## 2018-04-28 PROCEDURE — 87040 BLOOD CULTURE FOR BACTERIA: CPT

## 2018-04-28 PROCEDURE — 97116 GAIT TRAINING THERAPY: CPT

## 2018-04-28 PROCEDURE — 2580000003 HC RX 258: Performed by: ORTHOPAEDIC SURGERY

## 2018-04-28 PROCEDURE — 99232 SBSQ HOSP IP/OBS MODERATE 35: CPT | Performed by: INTERNAL MEDICINE

## 2018-04-28 PROCEDURE — C8929 TTE W OR WO FOL WCON,DOPPLER: HCPCS

## 2018-04-28 PROCEDURE — 36415 COLL VENOUS BLD VENIPUNCTURE: CPT

## 2018-04-28 RX ORDER — ASPIRIN 81 MG/1
81 TABLET, CHEWABLE ORAL 2 TIMES DAILY
Status: DISCONTINUED | OUTPATIENT
Start: 2018-04-28 | End: 2018-04-28 | Stop reason: HOSPADM

## 2018-04-28 RX ORDER — IBUPROFEN 400 MG/1
400 TABLET ORAL ONCE
Status: DISCONTINUED | OUTPATIENT
Start: 2018-04-28 | End: 2018-04-28 | Stop reason: HOSPADM

## 2018-04-28 RX ORDER — WATER FOR INJ.,BACTERIOSTATIC
VIAL (ML) INJECTION
Status: COMPLETED
Start: 2018-04-28 | End: 2018-04-28

## 2018-04-28 RX ADMIN — SENNOSIDES AND DOCUSATE SODIUM 1 TABLET: 8.6; 5 TABLET ORAL at 08:00

## 2018-04-28 RX ADMIN — Medication 10 ML: at 08:01

## 2018-04-28 RX ADMIN — BACTERIOSTATIC WATER: 1 INJECTION, SOLUTION INTRAMUSCULAR; INTRAVENOUS; SUBCUTANEOUS at 13:03

## 2018-04-28 RX ADMIN — ACETAMINOPHEN 650 MG: 325 TABLET ORAL at 08:00

## 2018-04-28 RX ADMIN — PANTOPRAZOLE SODIUM 40 MG: 40 TABLET, DELAYED RELEASE ORAL at 06:37

## 2018-04-28 RX ADMIN — OXYCODONE HYDROCHLORIDE AND ACETAMINOPHEN 1 TABLET: 5; 325 TABLET ORAL at 06:37

## 2018-04-28 RX ADMIN — ASPIRIN 81 MG 81 MG: 81 TABLET ORAL at 14:43

## 2018-04-28 RX ADMIN — OXYCODONE HYDROCHLORIDE AND ACETAMINOPHEN 1 TABLET: 5; 325 TABLET ORAL at 00:13

## 2018-04-28 RX ADMIN — ACETAMINOPHEN 650 MG: 325 TABLET ORAL at 14:43

## 2018-04-28 RX ADMIN — LEVOTHYROXINE SODIUM 200 MCG: 100 TABLET ORAL at 06:37

## 2018-04-28 RX ADMIN — DOCUSATE SODIUM 100 MG: 100 CAPSULE, LIQUID FILLED ORAL at 08:01

## 2018-04-28 ASSESSMENT — PAIN DESCRIPTION - ORIENTATION
ORIENTATION: RIGHT
ORIENTATION: RIGHT

## 2018-04-28 ASSESSMENT — PAIN DESCRIPTION - DESCRIPTORS
DESCRIPTORS: ACHING;SHARP
DESCRIPTORS: SHARP

## 2018-04-28 ASSESSMENT — ENCOUNTER SYMPTOMS
STRIDOR: 0
EYES NEGATIVE: 1
GASTROINTESTINAL NEGATIVE: 1
COUGH: 0
NAUSEA: 0
SHORTNESS OF BREATH: 0
WHEEZING: 0
RESPIRATORY NEGATIVE: 1
CHEST TIGHTNESS: 0
BLOOD IN STOOL: 0

## 2018-04-28 ASSESSMENT — PAIN DESCRIPTION - PAIN TYPE
TYPE: ACUTE PAIN;SURGICAL PAIN
TYPE: SURGICAL PAIN

## 2018-04-28 ASSESSMENT — PAIN SCALES - GENERAL
PAINLEVEL_OUTOF10: 3
PAINLEVEL_OUTOF10: 0
PAINLEVEL_OUTOF10: 4
PAINLEVEL_OUTOF10: 6
PAINLEVEL_OUTOF10: 5
PAINLEVEL_OUTOF10: 3
PAINLEVEL_OUTOF10: 8

## 2018-04-28 ASSESSMENT — PAIN DESCRIPTION - LOCATION
LOCATION: KNEE
LOCATION: KNEE

## 2018-04-30 PROCEDURE — 93010 ELECTROCARDIOGRAM REPORT: CPT | Performed by: INTERNAL MEDICINE

## 2018-05-03 LAB
BLOOD CULTURE, ROUTINE: NORMAL
CULTURE, BLOOD 2: NORMAL

## 2018-05-21 ENCOUNTER — HOSPITAL ENCOUNTER (OUTPATIENT)
Dept: PHYSICAL THERAPY | Age: 48
Setting detail: THERAPIES SERIES
Discharge: HOME | End: 2018-05-21
Payer: COMMERCIAL

## 2018-05-21 PROCEDURE — 97161 PT EVAL LOW COMPLEX 20 MIN: CPT

## 2018-05-21 PROCEDURE — 97110 THERAPEUTIC EXERCISES: CPT

## 2018-05-21 ASSESSMENT — PAIN SCALES - GENERAL: PAINLEVEL_OUTOF10: 5

## 2018-05-25 ENCOUNTER — HOSPITAL ENCOUNTER (OUTPATIENT)
Dept: PHYSICAL THERAPY | Age: 48
Setting detail: THERAPIES SERIES
Discharge: HOME | End: 2018-05-25
Payer: COMMERCIAL

## 2018-05-25 ENCOUNTER — OFFICE VISIT (OUTPATIENT)
Dept: CARDIOLOGY CLINIC | Age: 48
End: 2018-05-25
Payer: COMMERCIAL

## 2018-05-25 VITALS
BODY MASS INDEX: 42.49 KG/M2 | WEIGHT: 255 LBS | SYSTOLIC BLOOD PRESSURE: 108 MMHG | DIASTOLIC BLOOD PRESSURE: 74 MMHG | HEART RATE: 68 BPM | HEIGHT: 65 IN | RESPIRATION RATE: 16 BRPM

## 2018-05-25 DIAGNOSIS — E78.2 MIXED HYPERLIPIDEMIA: ICD-10-CM

## 2018-05-25 DIAGNOSIS — I10 ESSENTIAL HYPERTENSION: ICD-10-CM

## 2018-05-25 DIAGNOSIS — G47.33 OSA (OBSTRUCTIVE SLEEP APNEA): ICD-10-CM

## 2018-05-25 DIAGNOSIS — R00.1 BRADYCARDIA: ICD-10-CM

## 2018-05-25 DIAGNOSIS — Z86.39 HISTORY OF DIABETES MELLITUS: ICD-10-CM

## 2018-05-25 DIAGNOSIS — R06.02 SOB (SHORTNESS OF BREATH): Primary | ICD-10-CM

## 2018-05-25 DIAGNOSIS — R09.89 ABNORMAL FOOT PULSE: ICD-10-CM

## 2018-05-25 PROCEDURE — 97110 THERAPEUTIC EXERCISES: CPT

## 2018-05-25 PROCEDURE — 99214 OFFICE O/P EST MOD 30 MIN: CPT | Performed by: INTERNAL MEDICINE

## 2018-05-25 PROCEDURE — 1036F TOBACCO NON-USER: CPT | Performed by: INTERNAL MEDICINE

## 2018-05-25 PROCEDURE — G8417 CALC BMI ABV UP PARAM F/U: HCPCS | Performed by: INTERNAL MEDICINE

## 2018-05-25 PROCEDURE — 1111F DSCHRG MED/CURRENT MED MERGE: CPT | Performed by: INTERNAL MEDICINE

## 2018-05-25 PROCEDURE — 97140 MANUAL THERAPY 1/> REGIONS: CPT

## 2018-05-25 PROCEDURE — G8427 DOCREV CUR MEDS BY ELIG CLIN: HCPCS | Performed by: INTERNAL MEDICINE

## 2018-05-25 ASSESSMENT — PAIN SCALES - GENERAL: PAINLEVEL_OUTOF10: 3

## 2018-05-28 ENCOUNTER — HOSPITAL ENCOUNTER (EMERGENCY)
Age: 48
Discharge: HOME | End: 2018-05-28
Attending: FAMILY MEDICINE
Payer: COMMERCIAL

## 2018-05-28 ENCOUNTER — APPOINTMENT (OUTPATIENT)
Dept: CT IMAGING | Age: 48
End: 2018-05-28
Payer: COMMERCIAL

## 2018-05-28 ENCOUNTER — APPOINTMENT (OUTPATIENT)
Dept: GENERAL RADIOLOGY | Age: 48
End: 2018-05-28
Payer: COMMERCIAL

## 2018-05-28 VITALS
OXYGEN SATURATION: 98 % | HEART RATE: 70 BPM | TEMPERATURE: 98.2 F | DIASTOLIC BLOOD PRESSURE: 65 MMHG | RESPIRATION RATE: 18 BRPM | SYSTOLIC BLOOD PRESSURE: 114 MMHG

## 2018-05-28 DIAGNOSIS — M79.604 RIGHT LEG PAIN: Primary | ICD-10-CM

## 2018-05-28 DIAGNOSIS — R07.9 CHEST PAIN, UNSPECIFIED TYPE: ICD-10-CM

## 2018-05-28 LAB
ABSOLUTE EOS #: 0.2 K/UL (ref 0–0.4)
ABSOLUTE IMMATURE GRANULOCYTE: ABNORMAL K/UL (ref 0–0.3)
ABSOLUTE LYMPH #: 2.9 K/UL (ref 1–4.8)
ABSOLUTE MONO #: 0.4 K/UL (ref 0–1)
ANION GAP SERPL CALCULATED.3IONS-SCNC: 9 MMOL/L (ref 9–17)
BASOPHILS # BLD: 1 % (ref 0–2)
BASOPHILS ABSOLUTE: 0 K/UL (ref 0–0.2)
BUN BLDV-MCNC: 12 MG/DL (ref 6–20)
BUN/CREAT BLD: 9 (ref 9–20)
CALCIUM SERPL-MCNC: 9.6 MG/DL (ref 8.6–10.4)
CHLORIDE BLD-SCNC: 98 MMOL/L (ref 98–107)
CO2: 31 MMOL/L (ref 20–31)
CREAT SERPL-MCNC: 1.27 MG/DL (ref 0.5–0.9)
D-DIMER QUANTITATIVE: 4.39 MG/L FEU (ref 0–0.5)
DIFFERENTIAL TYPE: YES
EKG ATRIAL RATE: 70 BPM
EKG P AXIS: 39 DEGREES
EKG P-R INTERVAL: 154 MS
EKG Q-T INTERVAL: 404 MS
EKG QRS DURATION: 78 MS
EKG QTC CALCULATION (BAZETT): 436 MS
EKG R AXIS: -24 DEGREES
EKG T AXIS: 50 DEGREES
EKG VENTRICULAR RATE: 70 BPM
EOSINOPHILS RELATIVE PERCENT: 3 % (ref 0–5)
GFR AFRICAN AMERICAN: 54 ML/MIN
GFR NON-AFRICAN AMERICAN: 45 ML/MIN
GFR SERPL CREATININE-BSD FRML MDRD: ABNORMAL ML/MIN/{1.73_M2}
GFR SERPL CREATININE-BSD FRML MDRD: ABNORMAL ML/MIN/{1.73_M2}
GLUCOSE BLD-MCNC: 138 MG/DL (ref 70–99)
HCT VFR BLD CALC: 38.7 % (ref 36–46)
HEMOGLOBIN: 12.9 G/DL (ref 12–16)
IMMATURE GRANULOCYTES: ABNORMAL %
LIPASE: 16 U/L (ref 13–60)
LYMPHOCYTES # BLD: 42 % (ref 15–40)
MAGNESIUM: 2.6 MG/DL (ref 1.6–2.6)
MCH RBC QN AUTO: 28.4 PG (ref 26–34)
MCHC RBC AUTO-ENTMCNC: 33.4 G/DL (ref 31–37)
MCV RBC AUTO: 85 FL (ref 80–100)
MONOCYTES # BLD: 6 % (ref 4–8)
NRBC AUTOMATED: ABNORMAL PER 100 WBC
PDW BLD-RTO: 15 % (ref 12.1–15.2)
PLATELET # BLD: 278 K/UL (ref 140–450)
PLATELET ESTIMATE: ABNORMAL
PMV BLD AUTO: ABNORMAL FL (ref 6–12)
POTASSIUM SERPL-SCNC: 4 MMOL/L (ref 3.7–5.3)
RBC # BLD: 4.55 M/UL (ref 4–5.2)
RBC # BLD: ABNORMAL 10*6/UL
SEG NEUTROPHILS: 48 % (ref 47–75)
SEGMENTED NEUTROPHILS ABSOLUTE COUNT: 3.3 K/UL (ref 2.5–7)
SODIUM BLD-SCNC: 138 MMOL/L (ref 135–144)
TROPONIN INTERP: NORMAL
TROPONIN T: <0.03 NG/ML
WBC # BLD: 6.9 K/UL (ref 3.5–11)
WBC # BLD: ABNORMAL 10*3/UL

## 2018-05-28 PROCEDURE — 6360000004 HC RX CONTRAST MEDICATION: Performed by: FAMILY MEDICINE

## 2018-05-28 PROCEDURE — 83690 ASSAY OF LIPASE: CPT

## 2018-05-28 PROCEDURE — 83735 ASSAY OF MAGNESIUM: CPT

## 2018-05-28 PROCEDURE — 6370000000 HC RX 637 (ALT 250 FOR IP): Performed by: FAMILY MEDICINE

## 2018-05-28 PROCEDURE — S0028 INJECTION, FAMOTIDINE, 20 MG: HCPCS | Performed by: FAMILY MEDICINE

## 2018-05-28 PROCEDURE — 71275 CT ANGIOGRAPHY CHEST: CPT

## 2018-05-28 PROCEDURE — 93005 ELECTROCARDIOGRAM TRACING: CPT

## 2018-05-28 PROCEDURE — 36415 COLL VENOUS BLD VENIPUNCTURE: CPT

## 2018-05-28 PROCEDURE — 71046 X-RAY EXAM CHEST 2 VIEWS: CPT

## 2018-05-28 PROCEDURE — 84484 ASSAY OF TROPONIN QUANT: CPT

## 2018-05-28 PROCEDURE — 2580000003 HC RX 258: Performed by: FAMILY MEDICINE

## 2018-05-28 PROCEDURE — 80048 BASIC METABOLIC PNL TOTAL CA: CPT

## 2018-05-28 PROCEDURE — 94664 DEMO&/EVAL PT USE INHALER: CPT

## 2018-05-28 PROCEDURE — 85025 COMPLETE CBC W/AUTO DIFF WBC: CPT

## 2018-05-28 PROCEDURE — 96374 THER/PROPH/DIAG INJ IV PUSH: CPT

## 2018-05-28 PROCEDURE — 99285 EMERGENCY DEPT VISIT HI MDM: CPT

## 2018-05-28 PROCEDURE — 85379 FIBRIN DEGRADATION QUANT: CPT

## 2018-05-28 PROCEDURE — 2500000003 HC RX 250 WO HCPCS: Performed by: FAMILY MEDICINE

## 2018-05-28 RX ORDER — SODIUM CHLORIDE 9 MG/ML
150 INJECTION, SOLUTION INTRAVENOUS CONTINUOUS
Status: DISCONTINUED | OUTPATIENT
Start: 2018-05-28 | End: 2018-05-28 | Stop reason: HOSPADM

## 2018-05-28 RX ORDER — CYCLOBENZAPRINE HCL 10 MG
10 TABLET ORAL NIGHTLY
COMMUNITY
Start: 2018-05-09 | End: 2021-02-24 | Stop reason: ALTCHOICE

## 2018-05-28 RX ORDER — TRAMADOL HYDROCHLORIDE 50 MG/1
50 TABLET ORAL EVERY 6 HOURS PRN
COMMUNITY
End: 2021-02-24 | Stop reason: ALTCHOICE

## 2018-05-28 RX ORDER — IPRATROPIUM BROMIDE AND ALBUTEROL SULFATE 2.5; .5 MG/3ML; MG/3ML
1 SOLUTION RESPIRATORY (INHALATION) ONCE
Status: COMPLETED | OUTPATIENT
Start: 2018-05-28 | End: 2018-05-28

## 2018-05-28 RX ORDER — OXYCODONE HYDROCHLORIDE AND ACETAMINOPHEN 5; 325 MG/1; MG/1
2 TABLET ORAL EVERY 6 HOURS PRN
COMMUNITY
End: 2021-02-24 | Stop reason: ALTCHOICE

## 2018-05-28 RX ORDER — RANITIDINE 150 MG/1
150 TABLET ORAL PRN
COMMUNITY
Start: 2018-03-26

## 2018-05-28 RX ORDER — POLYETHYLENE GLYCOL 400, AND PROPYLENE GLYCOL 4; 3 MG/ML; MG/ML
2 SOLUTION/ DROPS OPHTHALMIC PRN
COMMUNITY
Start: 2018-03-26

## 2018-05-28 RX ADMIN — IPRATROPIUM BROMIDE AND ALBUTEROL SULFATE 1 AMPULE: .5; 3 SOLUTION RESPIRATORY (INHALATION) at 01:06

## 2018-05-28 RX ADMIN — APIXABAN 10 MG: 5 TABLET, FILM COATED ORAL at 03:42

## 2018-05-28 RX ADMIN — Medication 30 ML: at 01:03

## 2018-05-28 RX ADMIN — IOPAMIDOL 100 ML: 755 INJECTION, SOLUTION INTRAVENOUS at 02:54

## 2018-05-28 RX ADMIN — SODIUM CHLORIDE 150 ML/HR: 9 INJECTION, SOLUTION INTRAVENOUS at 01:03

## 2018-05-28 RX ADMIN — FAMOTIDINE 20 MG: 10 INJECTION INTRAVENOUS at 01:03

## 2018-05-28 ASSESSMENT — PAIN SCALES - GENERAL: PAINLEVEL_OUTOF10: 10

## 2018-05-28 ASSESSMENT — PAIN DESCRIPTION - PAIN TYPE: TYPE: ACUTE PAIN

## 2018-05-28 ASSESSMENT — PAIN DESCRIPTION - DESCRIPTORS: DESCRIPTORS: SHARP;SHOOTING

## 2018-05-28 ASSESSMENT — PAIN DESCRIPTION - LOCATION: LOCATION: LEG

## 2018-05-28 ASSESSMENT — PAIN DESCRIPTION - FREQUENCY: FREQUENCY: CONTINUOUS

## 2018-05-28 ASSESSMENT — PAIN DESCRIPTION - ORIENTATION: ORIENTATION: RIGHT;INNER

## 2018-05-29 ENCOUNTER — HOSPITAL ENCOUNTER (OUTPATIENT)
Dept: VASCULAR LAB | Age: 48
Discharge: HOME | End: 2018-05-31
Payer: COMMERCIAL

## 2018-05-29 DIAGNOSIS — M79.604 PAIN OF RIGHT LOWER EXTREMITY: ICD-10-CM

## 2018-05-29 PROCEDURE — 93971 EXTREMITY STUDY: CPT

## 2018-05-30 ENCOUNTER — HOSPITAL ENCOUNTER (OUTPATIENT)
Dept: PHYSICAL THERAPY | Age: 48
Setting detail: THERAPIES SERIES
Discharge: HOME | End: 2018-05-30
Payer: COMMERCIAL

## 2018-06-01 ENCOUNTER — APPOINTMENT (OUTPATIENT)
Dept: PHYSICAL THERAPY | Age: 48
End: 2018-06-01
Payer: COMMERCIAL

## 2018-06-06 ENCOUNTER — HOSPITAL ENCOUNTER (OUTPATIENT)
Dept: PHYSICAL THERAPY | Age: 48
Setting detail: THERAPIES SERIES
Discharge: HOME | End: 2018-06-06
Payer: COMMERCIAL

## 2018-06-06 PROCEDURE — 97110 THERAPEUTIC EXERCISES: CPT

## 2018-06-13 ENCOUNTER — HOSPITAL ENCOUNTER (OUTPATIENT)
Dept: NUCLEAR MEDICINE | Age: 48
Discharge: HOME | End: 2018-06-15
Payer: COMMERCIAL

## 2018-06-13 ENCOUNTER — HOSPITAL ENCOUNTER (OUTPATIENT)
Dept: ULTRASOUND IMAGING | Age: 48
Discharge: HOME | End: 2018-06-15
Payer: COMMERCIAL

## 2018-06-13 DIAGNOSIS — R09.89 ABNORMAL FOOT PULSE: ICD-10-CM

## 2018-06-13 DIAGNOSIS — R06.02 SOB (SHORTNESS OF BREATH): ICD-10-CM

## 2018-06-13 DIAGNOSIS — R00.1 BRADYCARDIA: ICD-10-CM

## 2018-06-13 PROCEDURE — 6360000002 HC RX W HCPCS: Performed by: INTERNAL MEDICINE

## 2018-06-13 PROCEDURE — 3430000000 HC RX DIAGNOSTIC RADIOPHARMACEUTICAL: Performed by: INTERNAL MEDICINE

## 2018-06-13 PROCEDURE — 78452 HT MUSCLE IMAGE SPECT MULT: CPT

## 2018-06-13 PROCEDURE — 93924 LWR XTR VASC STDY BILAT: CPT | Performed by: INTERNAL MEDICINE

## 2018-06-13 PROCEDURE — 2580000003 HC RX 258: Performed by: INTERNAL MEDICINE

## 2018-06-13 PROCEDURE — 93924 LWR XTR VASC STDY BILAT: CPT

## 2018-06-13 PROCEDURE — 93017 CV STRESS TEST TRACING ONLY: CPT

## 2018-06-13 PROCEDURE — A9502 TC99M TETROFOSMIN: HCPCS | Performed by: INTERNAL MEDICINE

## 2018-06-13 RX ORDER — SODIUM CHLORIDE 0.9 % (FLUSH) 0.9 %
10 SYRINGE (ML) INJECTION PRN
Status: COMPLETED | OUTPATIENT
Start: 2018-06-13 | End: 2018-06-13

## 2018-06-13 RX ADMIN — TETROFOSMIN 35.4 MILLICURIE: 0.23 INJECTION, POWDER, LYOPHILIZED, FOR SOLUTION INTRAVENOUS at 11:38

## 2018-06-13 RX ADMIN — Medication 10 ML: at 11:39

## 2018-06-13 RX ADMIN — Medication 10 ML: at 09:47

## 2018-06-13 RX ADMIN — REGADENOSON 0.4 MG: 0.08 INJECTION, SOLUTION INTRAVENOUS at 11:38

## 2018-06-13 RX ADMIN — Medication 10 ML: at 11:38

## 2018-06-13 RX ADMIN — TETROFOSMIN 10.5 MILLICURIE: 0.23 INJECTION, POWDER, LYOPHILIZED, FOR SOLUTION INTRAVENOUS at 09:47

## 2018-06-14 ENCOUNTER — HOSPITAL ENCOUNTER (OUTPATIENT)
Dept: PHYSICAL THERAPY | Age: 48
Setting detail: THERAPIES SERIES
End: 2018-06-14
Payer: COMMERCIAL

## 2018-06-15 ENCOUNTER — HOSPITAL ENCOUNTER (OUTPATIENT)
Dept: PHYSICAL THERAPY | Age: 48
Setting detail: THERAPIES SERIES
Discharge: HOME | End: 2018-06-15
Payer: COMMERCIAL

## 2018-06-15 PROCEDURE — 93018 CV STRESS TEST I&R ONLY: CPT | Performed by: INTERNAL MEDICINE

## 2018-06-18 ENCOUNTER — HOSPITAL ENCOUNTER (OUTPATIENT)
Dept: PHYSICAL THERAPY | Age: 48
Setting detail: THERAPIES SERIES
Discharge: HOME | End: 2018-06-18
Payer: COMMERCIAL

## 2018-06-18 PROCEDURE — 97110 THERAPEUTIC EXERCISES: CPT

## 2018-06-18 ASSESSMENT — PAIN SCALES - GENERAL: PAINLEVEL_OUTOF10: 6

## 2018-06-22 ENCOUNTER — HOSPITAL ENCOUNTER (OUTPATIENT)
Dept: PHYSICAL THERAPY | Age: 48
Setting detail: THERAPIES SERIES
Discharge: HOME | End: 2018-06-22
Payer: COMMERCIAL

## 2018-06-22 PROCEDURE — 97110 THERAPEUTIC EXERCISES: CPT

## 2018-06-25 ENCOUNTER — HOSPITAL ENCOUNTER (OUTPATIENT)
Dept: PHYSICAL THERAPY | Age: 48
Setting detail: THERAPIES SERIES
Discharge: HOME | End: 2018-06-25
Payer: COMMERCIAL

## 2018-06-25 PROCEDURE — 97110 THERAPEUTIC EXERCISES: CPT

## 2018-06-27 ENCOUNTER — HOSPITAL ENCOUNTER (OUTPATIENT)
Dept: PHYSICAL THERAPY | Age: 48
Setting detail: THERAPIES SERIES
Discharge: HOME | End: 2018-06-27
Payer: COMMERCIAL

## 2018-06-27 PROCEDURE — 97110 THERAPEUTIC EXERCISES: CPT

## 2018-06-29 ENCOUNTER — HOSPITAL ENCOUNTER (OUTPATIENT)
Dept: PHYSICAL THERAPY | Age: 48
Setting detail: THERAPIES SERIES
Discharge: HOME | End: 2018-06-29
Payer: COMMERCIAL

## 2018-06-29 PROCEDURE — 97110 THERAPEUTIC EXERCISES: CPT

## 2020-11-18 ENCOUNTER — HOSPITAL ENCOUNTER (OUTPATIENT)
Dept: GENERAL RADIOLOGY | Age: 50
Discharge: HOME | End: 2020-11-20
Payer: COMMERCIAL

## 2020-11-18 ENCOUNTER — HOSPITAL ENCOUNTER (OUTPATIENT)
Age: 50
Discharge: HOME | End: 2020-11-20
Payer: COMMERCIAL

## 2020-11-18 ENCOUNTER — HOSPITAL ENCOUNTER (OUTPATIENT)
Age: 50
Discharge: HOME | End: 2020-11-18
Payer: COMMERCIAL

## 2020-11-18 LAB
ANION GAP SERPL CALCULATED.3IONS-SCNC: 7 MMOL/L (ref 9–17)
BUN BLDV-MCNC: 14 MG/DL (ref 6–20)
BUN/CREAT BLD: 18 (ref 9–20)
CALCIUM SERPL-MCNC: 10.5 MG/DL (ref 8.6–10.4)
CHLORIDE BLD-SCNC: 100 MMOL/L (ref 98–107)
CO2: 30 MMOL/L (ref 20–31)
CREAT SERPL-MCNC: 0.76 MG/DL (ref 0.5–0.9)
GFR AFRICAN AMERICAN: >60 ML/MIN
GFR NON-AFRICAN AMERICAN: >60 ML/MIN
GFR SERPL CREATININE-BSD FRML MDRD: ABNORMAL ML/MIN/{1.73_M2}
GFR SERPL CREATININE-BSD FRML MDRD: ABNORMAL ML/MIN/{1.73_M2}
GLUCOSE BLD-MCNC: 150 MG/DL (ref 70–99)
HCT VFR BLD CALC: 42 % (ref 36–46)
HEMOGLOBIN: 14 G/DL (ref 12–16)
MCH RBC QN AUTO: 28 PG (ref 26–34)
MCHC RBC AUTO-ENTMCNC: 33.3 G/DL (ref 31–37)
MCV RBC AUTO: 84.1 FL (ref 80–100)
NRBC AUTOMATED: NORMAL PER 100 WBC
PDW BLD-RTO: 15.2 % (ref 12.1–15.2)
PLATELET # BLD: 295 K/UL (ref 140–450)
PMV BLD AUTO: NORMAL FL (ref 6–12)
POTASSIUM SERPL-SCNC: 4.3 MMOL/L (ref 3.7–5.3)
RBC # BLD: 5 M/UL (ref 4–5.2)
SODIUM BLD-SCNC: 137 MMOL/L (ref 135–144)
WBC # BLD: 7.6 K/UL (ref 3.5–11)

## 2020-11-18 PROCEDURE — 36415 COLL VENOUS BLD VENIPUNCTURE: CPT

## 2020-11-18 PROCEDURE — 73110 X-RAY EXAM OF WRIST: CPT

## 2020-11-18 PROCEDURE — 93005 ELECTROCARDIOGRAM TRACING: CPT | Performed by: ORTHOPAEDIC SURGERY

## 2020-11-18 PROCEDURE — 80048 BASIC METABOLIC PNL TOTAL CA: CPT

## 2020-11-18 PROCEDURE — 85027 COMPLETE CBC AUTOMATED: CPT

## 2020-11-18 PROCEDURE — 71046 X-RAY EXAM CHEST 2 VIEWS: CPT

## 2020-11-19 LAB
EKG ATRIAL RATE: 72 BPM
EKG P AXIS: 67 DEGREES
EKG P-R INTERVAL: 160 MS
EKG Q-T INTERVAL: 390 MS
EKG QRS DURATION: 84 MS
EKG QTC CALCULATION (BAZETT): 427 MS
EKG R AXIS: -11 DEGREES
EKG T AXIS: 53 DEGREES
EKG VENTRICULAR RATE: 72 BPM

## 2020-11-19 PROCEDURE — 93010 ELECTROCARDIOGRAM REPORT: CPT | Performed by: INTERNAL MEDICINE

## 2021-01-27 ENCOUNTER — HOSPITAL ENCOUNTER (OUTPATIENT)
Dept: GENERAL RADIOLOGY | Age: 51
Discharge: HOME | End: 2021-01-29
Payer: COMMERCIAL

## 2021-01-27 ENCOUNTER — HOSPITAL ENCOUNTER (OUTPATIENT)
Age: 51
Discharge: HOME | End: 2021-01-29
Payer: COMMERCIAL

## 2021-01-27 DIAGNOSIS — M25.561 PAIN IN BOTH KNEES, UNSPECIFIED CHRONICITY: ICD-10-CM

## 2021-01-27 DIAGNOSIS — M25.562 PAIN IN BOTH KNEES, UNSPECIFIED CHRONICITY: ICD-10-CM

## 2021-01-27 PROCEDURE — 73564 X-RAY EXAM KNEE 4 OR MORE: CPT

## 2021-02-24 ENCOUNTER — HOSPITAL ENCOUNTER (EMERGENCY)
Age: 51
Discharge: HOME | End: 2021-02-24
Attending: EMERGENCY MEDICINE
Payer: COMMERCIAL

## 2021-02-24 VITALS
WEIGHT: 268 LBS | BODY MASS INDEX: 45.75 KG/M2 | OXYGEN SATURATION: 98 % | SYSTOLIC BLOOD PRESSURE: 101 MMHG | TEMPERATURE: 98.6 F | DIASTOLIC BLOOD PRESSURE: 63 MMHG | RESPIRATION RATE: 18 BRPM | HEIGHT: 64 IN | HEART RATE: 96 BPM

## 2021-02-24 DIAGNOSIS — E86.0 DEHYDRATION: Primary | ICD-10-CM

## 2021-02-24 LAB
-: NORMAL
ABSOLUTE EOS #: 0 K/UL (ref 0–0.4)
ABSOLUTE IMMATURE GRANULOCYTE: ABNORMAL K/UL (ref 0–0.3)
ABSOLUTE LYMPH #: 2.3 K/UL (ref 1–4.8)
ABSOLUTE MONO #: 0.4 K/UL (ref 0–1)
ALBUMIN SERPL-MCNC: 4.1 G/DL (ref 3.5–5.2)
ALBUMIN/GLOBULIN RATIO: ABNORMAL (ref 1–2.5)
ALP BLD-CCNC: 195 U/L (ref 35–104)
ALT SERPL-CCNC: 55 U/L (ref 5–33)
AMORPHOUS: NORMAL
ANION GAP SERPL CALCULATED.3IONS-SCNC: 13 MMOL/L (ref 9–17)
AST SERPL-CCNC: 51 U/L
BACTERIA: NORMAL
BASOPHILS # BLD: 1 % (ref 0–2)
BASOPHILS ABSOLUTE: 0.1 K/UL (ref 0–0.2)
BILIRUB SERPL-MCNC: 1.24 MG/DL (ref 0.3–1.2)
BILIRUBIN URINE: NEGATIVE
BUN BLDV-MCNC: 14 MG/DL (ref 6–20)
BUN/CREAT BLD: 20 (ref 9–20)
CALCIUM SERPL-MCNC: 10.9 MG/DL (ref 8.6–10.4)
CASTS UA: NORMAL /LPF
CHLORIDE BLD-SCNC: 99 MMOL/L (ref 98–107)
CO2: 24 MMOL/L (ref 20–31)
COLOR: YELLOW
COMMENT UA: ABNORMAL
CREAT SERPL-MCNC: 0.71 MG/DL (ref 0.5–0.9)
CRYSTALS, UA: NORMAL /HPF
DIFFERENTIAL TYPE: YES
EOSINOPHILS RELATIVE PERCENT: 1 % (ref 0–5)
EPITHELIAL CELLS UA: NORMAL /HPF
GFR AFRICAN AMERICAN: >60 ML/MIN
GFR NON-AFRICAN AMERICAN: >60 ML/MIN
GFR SERPL CREATININE-BSD FRML MDRD: ABNORMAL ML/MIN/{1.73_M2}
GFR SERPL CREATININE-BSD FRML MDRD: ABNORMAL ML/MIN/{1.73_M2}
GLUCOSE BLD-MCNC: 191 MG/DL (ref 70–99)
GLUCOSE URINE: ABNORMAL
HCT VFR BLD CALC: 47.3 % (ref 36–46)
HEMOGLOBIN: 15.8 G/DL (ref 12–16)
IMMATURE GRANULOCYTES: ABNORMAL %
KETONES, URINE: ABNORMAL
LEUKOCYTE ESTERASE, URINE: NEGATIVE
LYMPHOCYTES # BLD: 25 % (ref 15–40)
MCH RBC QN AUTO: 28.4 PG (ref 26–34)
MCHC RBC AUTO-ENTMCNC: 33.4 G/DL (ref 31–37)
MCV RBC AUTO: 84.9 FL (ref 80–100)
MONOCYTES # BLD: 4 % (ref 4–8)
MUCUS: NORMAL
NITRITE, URINE: NEGATIVE
NRBC AUTOMATED: ABNORMAL PER 100 WBC
OTHER OBSERVATIONS UA: NORMAL
PDW BLD-RTO: 14.6 % (ref 12.1–15.2)
PH UA: 5 (ref 5–8)
PLATELET # BLD: 304 K/UL (ref 140–450)
PLATELET ESTIMATE: ABNORMAL
PMV BLD AUTO: ABNORMAL FL (ref 6–12)
POTASSIUM SERPL-SCNC: 4.5 MMOL/L (ref 3.7–5.3)
PROTEIN UA: NEGATIVE
RBC # BLD: 5.57 M/UL (ref 4–5.2)
RBC # BLD: ABNORMAL 10*6/UL
RBC UA: NORMAL /HPF (ref 0–2)
RENAL EPITHELIAL, UA: NORMAL /HPF
SEG NEUTROPHILS: 69 % (ref 47–75)
SEGMENTED NEUTROPHILS ABSOLUTE COUNT: 6.5 K/UL (ref 2.5–7)
SODIUM BLD-SCNC: 136 MMOL/L (ref 135–144)
SPECIFIC GRAVITY UA: 1.02 (ref 1–1.03)
TOTAL PROTEIN: 7.8 G/DL (ref 6.4–8.3)
TRICHOMONAS: NORMAL
TURBIDITY: CLEAR
URINE HGB: ABNORMAL
UROBILINOGEN, URINE: NORMAL
WBC # BLD: 9.3 K/UL (ref 3.5–11)
WBC # BLD: ABNORMAL 10*3/UL
WBC UA: NORMAL /HPF
YEAST: NORMAL

## 2021-02-24 PROCEDURE — 2580000003 HC RX 258: Performed by: EMERGENCY MEDICINE

## 2021-02-24 PROCEDURE — 85025 COMPLETE CBC W/AUTO DIFF WBC: CPT

## 2021-02-24 PROCEDURE — 80053 COMPREHEN METABOLIC PANEL: CPT

## 2021-02-24 PROCEDURE — 6370000000 HC RX 637 (ALT 250 FOR IP): Performed by: EMERGENCY MEDICINE

## 2021-02-24 PROCEDURE — 81001 URINALYSIS AUTO W/SCOPE: CPT

## 2021-02-24 PROCEDURE — 96374 THER/PROPH/DIAG INJ IV PUSH: CPT

## 2021-02-24 PROCEDURE — 6360000002 HC RX W HCPCS: Performed by: EMERGENCY MEDICINE

## 2021-02-24 PROCEDURE — 99283 EMERGENCY DEPT VISIT LOW MDM: CPT

## 2021-02-24 RX ORDER — 0.9 % SODIUM CHLORIDE 0.9 %
1000 INTRAVENOUS SOLUTION INTRAVENOUS ONCE
Status: DISCONTINUED | OUTPATIENT
Start: 2021-02-24 | End: 2021-02-24

## 2021-02-24 RX ORDER — BUMETANIDE 1 MG/1
1 TABLET ORAL DAILY
COMMUNITY
Start: 2020-06-29

## 2021-02-24 RX ORDER — ONDANSETRON 2 MG/ML
4 INJECTION INTRAMUSCULAR; INTRAVENOUS ONCE
Status: COMPLETED | OUTPATIENT
Start: 2021-02-24 | End: 2021-02-24

## 2021-02-24 RX ORDER — BUDESONIDE AND FORMOTEROL FUMARATE DIHYDRATE 80; 4.5 UG/1; UG/1
2 AEROSOL RESPIRATORY (INHALATION) 2 TIMES DAILY
COMMUNITY
Start: 2020-08-04

## 2021-02-24 RX ORDER — 0.9 % SODIUM CHLORIDE 0.9 %
1000 INTRAVENOUS SOLUTION INTRAVENOUS ONCE
Status: COMPLETED | OUTPATIENT
Start: 2021-02-24 | End: 2021-02-24

## 2021-02-24 RX ORDER — ATORVASTATIN CALCIUM 10 MG/1
10 TABLET, FILM COATED ORAL DAILY
COMMUNITY
Start: 2020-06-29

## 2021-02-24 RX ORDER — ONDANSETRON 4 MG/1
4 TABLET, ORALLY DISINTEGRATING ORAL ONCE
Status: COMPLETED | OUTPATIENT
Start: 2021-02-24 | End: 2021-02-24

## 2021-02-24 RX ADMIN — SODIUM CHLORIDE 1000 ML: 9 INJECTION, SOLUTION INTRAVENOUS at 21:20

## 2021-02-24 RX ADMIN — ONDANSETRON 4 MG: 2 INJECTION INTRAMUSCULAR; INTRAVENOUS at 21:49

## 2021-02-24 RX ADMIN — ONDANSETRON 4 MG: 4 TABLET, ORALLY DISINTEGRATING ORAL at 23:23

## 2021-07-26 NOTE — ED PROVIDER NOTES
Birth Control Pills Counseling: Birth Control Pill Counseling: I discussed with the patient the potential side effects of OCPs including but not limited to increased risk of stroke, heart attack, thrombophlebitis, deep venous thrombosis, hepatic adenomas, breast changes, GI upset, headaches, and depression.  The patient verbalized understanding of the proper use and possible adverse effects of OCPs. All of the patient's questions and concerns were addressed. [address] COMPLAINT    Chief Complaint   Patient presents with    Hyperglycemia     pt BS was in the 300s last night and she took her insulin and it only dropped down 9 \"points\" and she has been puking since last night. HPI    Spenser Hamilton is a 44 y.o. female who presentsto ED from home. By car. With complaint of high blood sugar   Onset tonight. Patient has insulin-dependent diabetes. Patient's of blood sugar was in the 300s at home. Patient has been vomiting. Patient is not able to keep liquids down. Patient denies fever.       PAST MEDICAL HISTORY    Past Medical History:   Diagnosis Date    Arthritis     Asthma     Bipolar disorder ([address]     Bradycardia 2018    [facility]     Diabetes mellitus [facility]     Essential hypertension 2018    History of diabetes mellitus 2018    Hyperlipidemia     diet /exercise management    Mixed hyperlipidemia 2018    JOSEPH (obstructive sleep apnea) 2018    JOSEPH on CPAP     PONV (postoperative nausea and vomiting)     Sleep apnea     Thyroid disease        SURGICAL HISTORY    Past Surgical History:   Procedure Laterality Date    ACHILLES TENDON SURGERY Bilateral     APPENDECTOMY      BREAST SURGERY       SECTION      x2    CHOLECYSTECTOMY      COLONOSCOPY      ENDOSCOPY, COLON, DIAGNOSTIC      HERNIA REPAIR      HYSTERECTOMY      KNEE SURGERY Left     meniscus    OTHER SURGICAL HISTORY      scar tissue removal from left foot     SC KNEE SCOPE,DIAGNOSTIC Left 3/2/2017    LEFT KNEE ARTHROSCOPIC PARTIAL MEDIAL MENISCECTOMY , LATEX ALLERGY  performed by Paolo Montelongo MD at [facility] Right 2017    RIGHT KNEE ARTHROSCOPY CHONDROPLASTY, LATEX ALLERGY  performed by Paolo Montelongo MD at [address] [place] [address]. Left 2017    LEFT  KNEE TOTAL KNEE ARTHROPLASTY performed by Elena Herr Minocycline Pregnancy And Lactation Text: This medication is Pregnancy Category D and not consider safe during pregnancy. It is also excreted in breast milk. SOCIAL HISTORY    Social History     Socioeconomic History    Marital status:      Spouse name: None    Number of children: None    Years of education: None    Highest education level: None   Occupational History    None   Social Needs    Financial resource strain: None    Food insecurity     Worry: None     Inability: None    Transportation needs     Medical: None     Non-medical: None   Tobacco Use    Smoking status: Former Smoker     Years: 15.00     Quit date: 12/1/2010     Years since quitting: 10.2    Smokeless tobacco: Never Used   Substance and Sexual Activity    Alcohol use: No    Drug use: Yes     Types: Marijuana     Comment: medical     Sexual activity: None   Lifestyle    Physical activity     Days per week: None     Minutes per session: None    Stress: None   Relationships    Social connections     Talks on phone: None     Gets together: None     Attends Yazidism service: None     Active member of club or organization: None     Attends meetings of clubs or organizations: None     Relationship status: None    Intimate partner violence     Fear of current or ex partner: None     Emotionally abused: None     Physically abused: None     Forced sexual activity: None   Other Topics Concern    None   Social History Narrative    None           Review of Systems:  Constitutional: Positive for generalized weakness and nausea and vomiting  Eyes:  Denies photophobia or discharge   HENT:  Denies sore throat or ear pain   Respiratory:  Denies cough or shortness of breath   Cardiovascular:  Denies chest pain, palpitations or swelling   GI: Positive for nausea and vomiting musculoskeletal:  Denies back pain   Skin:  Denies rash   Neurologic:  Denies headache, focal weakness or sensory changes   Endocrine:  Denies polyuria or polydypsia   Lymphatic:  Denies swollen glands   Psychiatric:  Denies depression, suicidal ideation or homicidal ideation   All systems negative except as marked. Isotretinoin Pregnancy And Lactation Text: This medication is Pregnancy Category X and is considered extremely dangerous during pregnancy. It is unknown if it is excreted in breast milk. PHYSICAL EXAM    VITAL SIGNS: /63   Pulse 96   Temp 98.6 °F (37 °C)   Resp 18   Ht 5' 4\" (1.626 m)   Wt 268 lb (121.6 kg)   SpO2 98%   BMI 46.00 kg/m²    Constitutional: Ill-appearing female HENT:  Normocephalic, Atraumatic, Bilateral external ears normal, Oropharynx dry, No oral exudates, Nose normal. Neck- Normal range of motion, No tenderness, Supple, No stridor. Eyes:  PERRL, EOMI, Conjunctiva normal, No discharge. Respiratory:  Normal breath sounds, No respiratory distress, No wheezing, No chest tenderness. Cardiovascular:  Normal heart rate, Normal rhythm, No murmurs, No rubs, No gallops. GI:  Bowel sounds normal, Soft, No tenderness, No masses, No pulsatile masses. : External genitalia appear normal, No masses or lesions. No discharge. No CVA tenderness. Musculoskeletal:  Intact distal pulses, No edema, No tenderness, No cyanosis, No clubbing. Good range of motion in all major joints. No tenderness to palpation or major deformities noted. Back- No tenderness. Integument:  Warm, Dry, No erythema, No rash. Lymphatic:  No lymphadenopathy noted. Neurologic:  Alert & oriented x 3, Normal motor function, Normal sensory function, No focal deficits noted.    Psychiatric:  Affect normal, Judgment normal, Mood normal.     EKG        RADIOLOGY    No orders to display       PROCEDURES        Labs  Labs Reviewed   CBC WITH AUTO DIFFERENTIAL - Abnormal; Notable for the following components:       Result Value    RBC 5.57 (*)     Hematocrit 47.3 (*)     All other components within normal limits   COMPREHENSIVE METABOLIC PANEL - Abnormal; Notable for the following components:    Glucose 191 (*)     Calcium 10.9 (*)     Alkaline Phosphatase 195 (*)     ALT 55 (*)     AST 51 (*)     Total Bilirubin 1.24 (*)     All other components within normal limits   URINALYSIS - Abnormal; Notable for the following components:    Glucose, Ur 1000 mg/dL (*)     Ketones, Urine LARGE (*)     Urine Hgb 1+ Topical Clindamycin Pregnancy And Lactation Text: This medication is Pregnancy Category B and is considered safe during pregnancy. It is unknown if it is excreted in breast milk. Isotretinoin Counseling: Patient should get monthly blood tests, not donate blood, not drive at night if vision affected, not share medication, and not undergo elective surgery for 6 months after tx completed. Side effects reviewed, pt to contact office should one occur. Spironolactone Pregnancy And Lactation Text: This medication can cause feminization of the male fetus and should be avoided during pregnancy. The active metabolite is also found in breast milk. Topical Clindamycin Counseling: Patient counseled that this medication may cause skin irritation or allergic reactions.  In the event of skin irritation, the patient was advised to reduce the amount of the drug applied or use it less frequently.   The patient verbalized understanding of the proper use and possible adverse effects of clindamycin.  All of the patient's questions and concerns were addressed. Topical Retinoid counseling:  Patient advised to apply a pea-sized amount only at bedtime and wait 30 minutes after washing their face before applying.  If too drying, patient may add a non-comedogenic moisturizer. The patient verbalized understanding of the proper use and possible adverse effects of retinoids.  All of the patient's questions and concerns were addressed. Doxycycline Counseling:  Patient counseled regarding possible photosensitivity and increased risk for sunburn.  Patient instructed to avoid sunlight, if possible.  When exposed to sunlight, patients should wear protective clothing, sunglasses, and sunscreen.  The patient was instructed to call the office immediately if the following severe adverse effects occur:  hearing changes, easy bruising/bleeding, severe headache, or vision changes.  The patient verbalized understanding of the proper use and possible adverse effects of doxycycline.  All of the patient's questions and concerns were addressed. High Dose Vitamin A Counseling: Side effects reviewed, pt to contact office should one occur. Topical Retinoid Pregnancy And Lactation Text: This medication is Pregnancy Category C. It is unknown if this medication is excreted in breast milk. Birth Control Pills Pregnancy And Lactation Text: This medication should be avoided if pregnant and for the first 30 days post-partum. Use Enhanced Medication Counseling?: No Tetracycline Counseling: Patient counseled regarding possible photosensitivity and increased risk for sunburn.  Patient instructed to avoid sunlight, if possible.  When exposed to sunlight, patients should wear protective clothing, sunglasses, and sunscreen.  The patient was instructed to call the office immediately if the following severe adverse effects occur:  hearing changes, easy bruising/bleeding, severe headache, or vision changes.  The patient verbalized understanding of the proper use and possible adverse effects of tetracycline.  All of the patient's questions and concerns were addressed. Patient understands to avoid pregnancy while on therapy due to potential birth defects. Doxycycline Pregnancy And Lactation Text: This medication is Pregnancy Category D and not consider safe during pregnancy. It is also excreted in breast milk but is considered safe for shorter treatment courses. Azithromycin Counseling:  I discussed with the patient the risks of azithromycin including but not limited to GI upset, allergic reaction, drug rash, diarrhea, and yeast infections. Sarecycline Counseling: Patient advised regarding possible photosensitivity and discoloration of the teeth, skin, lips, tongue and gums.  Patient instructed to avoid sunlight, if possible.  When exposed to sunlight, patients should wear protective clothing, sunglasses, and sunscreen.  The patient was instructed to call the office immediately if the following severe adverse effects occur:  hearing changes, easy bruising/bleeding, severe headache, or vision changes.  The patient verbalized understanding of the proper use and possible adverse effects of sarecycline.  All of the patient's questions and concerns were addressed. Benzoyl Peroxide Counseling: Patient counseled that medicine may cause skin irritation and bleach clothing.  In the event of skin irritation, the patient was advised to reduce the amount of the drug applied or use it less frequently.   The patient verbalized understanding of the proper use and possible adverse effects of benzoyl peroxide.  All of the patient's questions and concerns were addressed. Dapsone Counseling: I discussed with the patient the risks of dapsone including but not limited to hemolytic anemia, agranulocytosis, rashes, methemoglobinemia, kidney failure, peripheral neuropathy, headaches, GI upset, and liver toxicity.  Patients who start dapsone require monitoring including baseline LFTs and weekly CBCs for the first month, then every month thereafter.  The patient verbalized understanding of the proper use and possible adverse effects of dapsone.  All of the patient's questions and concerns were addressed. Topical Sulfur Applications Pregnancy And Lactation Text: This medication is Pregnancy Category C and has an unknown safety profile during pregnancy. It is unknown if this topical medication is excreted in breast milk. Bactrim Counseling:  I discussed with the patient the risks of sulfa antibiotics including but not limited to GI upset, allergic reaction, drug rash, diarrhea, dizziness, photosensitivity, and yeast infections.  Rarely, more serious reactions can occur including but not limited to aplastic anemia, agranulocytosis, methemoglobinemia, blood dyscrasias, liver or kidney failure, lung infiltrates or desquamative/blistering drug rashes. Azithromycin Pregnancy And Lactation Text: This medication is considered safe during pregnancy and is also secreted in breast milk. Topical Sulfur Applications Counseling: Topical Sulfur Counseling: Patient counseled that this medication may cause skin irritation or allergic reactions.  In the event of skin irritation, the patient was advised to reduce the amount of the drug applied or use it less frequently.   The patient verbalized understanding of the proper use and possible adverse effects of topical sulfur application.  All of the patient's questions and concerns were addressed. Detail Level: Zone Erythromycin Counseling:  I discussed with the patient the risks of erythromycin including but not limited to GI upset, allergic reaction, drug rash, diarrhea, increase in liver enzymes, and yeast infections. Tazorac Counseling:  Patient advised that medication is irritating and drying.  Patient may need to apply sparingly and wash off after an hour before eventually leaving it on overnight.  The patient verbalized understanding of the proper use and possible adverse effects of tazorac.  All of the patient's questions and concerns were addressed. High Dose Vitamin A Pregnancy And Lactation Text: High dose vitamin A therapy is contraindicated during pregnancy and breast feeding. Minocycline Counseling: Patient advised regarding possible photosensitivity and discoloration of the teeth, skin, lips, tongue and gums.  Patient instructed to avoid sunlight, if possible.  When exposed to sunlight, patients should wear protective clothing, sunglasses, and sunscreen.  The patient was instructed to call the office immediately if the following severe adverse effects occur:  hearing changes, easy bruising/bleeding, severe headache, or vision changes.  The patient verbalized understanding of the proper use and possible adverse effects of minocycline.  All of the patient's questions and concerns were addressed. Benzoyl Peroxide Pregnancy And Lactation Text: This medication is Pregnancy Category C. It is unknown if benzoyl peroxide is excreted in breast milk. Bactrim Pregnancy And Lactation Text: This medication is Pregnancy Category D and is known to cause fetal risk.  It is also excreted in breast milk. Spironolactone Counseling: Patient advised regarding risks of diarrhea, abdominal pain, hyperkalemia, birth defects (for female patients), liver toxicity and renal toxicity. The patient may need blood work to monitor liver and kidney function and potassium levels while on therapy. The patient verbalized understanding of the proper use and possible adverse effects of spironolactone.  All of the patient's questions and concerns were addressed. Erythromycin Pregnancy And Lactation Text: This medication is Pregnancy Category B and is considered safe during pregnancy. It is also excreted in breast milk. Tazorac Pregnancy And Lactation Text: This medication is not safe during pregnancy. It is unknown if this medication is excreted in breast milk. Dapsone Pregnancy And Lactation Text: This medication is Pregnancy Category C and is not considered safe during pregnancy or breast feeding. Detail Level: Simple

## 2021-08-09 ENCOUNTER — APPOINTMENT (OUTPATIENT)
Dept: GENERAL RADIOLOGY | Age: 51
End: 2021-08-09
Payer: COMMERCIAL

## 2021-08-09 ENCOUNTER — HOSPITAL ENCOUNTER (EMERGENCY)
Age: 51
Discharge: HOME | End: 2021-08-09
Attending: FAMILY MEDICINE
Payer: COMMERCIAL

## 2021-08-09 VITALS
HEART RATE: 75 BPM | SYSTOLIC BLOOD PRESSURE: 102 MMHG | BODY MASS INDEX: 44.9 KG/M2 | HEIGHT: 64 IN | RESPIRATION RATE: 20 BRPM | DIASTOLIC BLOOD PRESSURE: 41 MMHG | TEMPERATURE: 98.4 F | OXYGEN SATURATION: 97 % | WEIGHT: 263 LBS

## 2021-08-09 DIAGNOSIS — S93.402A SPRAIN OF LEFT ANKLE, UNSPECIFIED LIGAMENT, INITIAL ENCOUNTER: Primary | ICD-10-CM

## 2021-08-09 PROCEDURE — 99283 EMERGENCY DEPT VISIT LOW MDM: CPT

## 2021-08-09 PROCEDURE — 73610 X-RAY EXAM OF ANKLE: CPT

## 2021-08-09 ASSESSMENT — PAIN DESCRIPTION - DESCRIPTORS: DESCRIPTORS: ACHING

## 2021-08-09 ASSESSMENT — PAIN DESCRIPTION - LOCATION: LOCATION: ANKLE

## 2021-08-09 ASSESSMENT — PAIN DESCRIPTION - ORIENTATION: ORIENTATION: LEFT

## 2021-08-09 ASSESSMENT — PAIN DESCRIPTION - PAIN TYPE: TYPE: ACUTE PAIN

## 2021-08-09 ASSESSMENT — PAIN SCALES - GENERAL: PAINLEVEL_OUTOF10: 10

## 2021-08-09 ASSESSMENT — PAIN DESCRIPTION - FREQUENCY: FREQUENCY: CONTINUOUS

## 2021-08-09 NOTE — ED PROVIDER NOTES
eMERGENCY dEPARTMENT eNCOUnter        [address] [facility]    Chief Complaint   Patient presents with    Ankle Pain     Left ankle pain after slipping on water at [place]       [facility]    Gi Shah is a 44 y.o. female who presents to ED from home. By car. With complaint of left ankle pain. Onset prior to arrival. Patient states that she slipped on water at [place]  Intensity of symptoms moderate pain in the left ankle. Location of symptoms left ankle. Patient is able to walk without assistance.     REVIEW OF SYSTEMS    All systems reviewed and positives are in the HPI      PAST MEDICAL HISTORY    Past Medical History:   Diagnosis Date    Arthritis     Asthma     [facility]     Bradycardia 2018    COPD (chronic obstructive pulmonary disease) ([facility]     Diabetes mellitus ([facility]     Essential hypertension 2018    History of diabetes mellitus 2018    Hyperlipidemia     diet /exercise management    Mixed hyperlipidemia 2018    JOSEPH (obstructive sleep apnea) 2018    JOSEPH on CPAP     PONV (postoperative nausea and vomiting)     Sleep apnea     Thyroid disease        SURGICAL HISTORY    Past Surgical History:   Procedure Laterality Date    ACHILLES TENDON SURGERY Bilateral     APPENDECTOMY      BREAST SURGERY       SECTION      x2    CHOLECYSTECTOMY      COLONOSCOPY      ENDOSCOPY, COLON, DIAGNOSTIC      HERNIA REPAIR      HYSTERECTOMY      KNEE SURGERY Left     meniscus    OTHER SURGICAL HISTORY      scar tissue removal from left foot     DC KNEE SCOPE,DIAGNOSTIC Left 3/2/2017    LEFT KNEE ARTHROSCOPIC PARTIAL MEDIAL MENISCECTOMY , LATEX ALLERGY  performed by Hernán Longo MD at [place] Right 2017    RIGHT KNEE ARTHROSCOPY CHONDROPLASTY, LATEX ALLERGY  performed by Hernán Longo MD at [place] [address] Left 2017    LEFT  KNEE TOTAL KNEE ARTHROPLASTY performed by Malik Snyder Segun Sanz MD at [facility]. Right 4/26/2018    RIGHT KNEE TOTAL KNEE  ARTHROPLASTY, CLAUDIO CR UNCEMENTED, NERVE BLOCK, LATEX ALLERGY performed by Rita Wren MD at [address] Bilateral        CURRENT MEDICATIONS    Current Outpatient Rx   Medication Sig Dispense Refill    metoprolol tartrate (LOPRESSOR) 25 MG tablet Take 25 mg by mouth      lurasidone (LATUDA) 60 MG TABS tablet Take 60 mg by mouth      Liraglutide (VICTOZA) 18 MG/3ML SOPN SC injection Inject 1.8mg once daily      bumetanide (BUMEX) 1 MG tablet Take 1 mg by mouth daily      atorvastatin (LIPITOR) 10 MG tablet Take 10 mg by mouth daily      budesonide-formoterol (SYMBICORT) 80-4.5 MCG/ACT AERO Inhale 2 puffs into the lungs 2 times daily      EQ LUBRICANT EYE DROPS 0.4-0.3 % ophthalmic solution Apply 2 drops to eye as needed      ranitidine (ZANTAC) 150 MG tablet Take 150 mg by mouth as needed      acetaminophen (TYLENOL) 325 MG tablet Take 2 tablets by mouth every 6 hours (Patient taking differently: Take 1,000 mg by mouth every 6 hours ) 120 tablet 0    aspirin 81 MG EC tablet Take 1 tablet by mouth 2 times daily 60 tablet 0    UNKNOWN TO PATIENT Take 1 tablet by mouth nightly Bipolar medicine      fluticasone-vilanterol (BREO ELLIPTA) 100-25 MCG/INH AEPB inhaler Inhale 1 puff into the lungs daily      ipratropium-albuterol (DUONEB) 0.5-2.5 (3) MG/3ML SOLN nebulizer solution Take 1 vial by nebulization every 4 hours      levothyroxine (SYNTHROID) 300 MCG tablet Take 350 mcg by mouth Daily          ALLERGIES    Allergies   Allergen Reactions    Latex Rash    Bactrim [Sulfamethoxazole-Trimethoprim] Rash    Tape Corrinne Gens Tape] Rash       FAMILY HISTORY    Family History   Problem Relation Age of Onset    Cancer Mother     Diabetes Mother     Cancer Father     Diabetes Father     Diabetes Sister     Thyroid Disease Sister     Arthritis Sister     Asthma Son SOCIAL HISTORY    Social History     Socioeconomic History    Marital status:      Spouse name: None    Number of children: None    Years of education: None    Highest education level: None   Occupational History    None   Tobacco Use    Smoking status: Former Smoker     Years: 15.00     Quit date: 12/1/2010     Years since quitting: 10.6    Smokeless tobacco: Never Used   Vaping Use    Vaping Use: Never used   Substance and Sexual Activity    Alcohol use: No    Drug use: Yes     Types: Marijuana     Comment: medical     Sexual activity: None   Other Topics Concern    None   Social History Narrative    None     Social Determinants of Health     Financial Resource Strain:     Difficulty of Paying Living Expenses:    Food Insecurity:     Worried About Running Out of Food in the Last Year:     Ran Out of Food in the Last Year:    Transportation Needs:     Lack of Transportation (Medical):      Lack of Transportation (Non-Medical):    Physical Activity:     Days of Exercise per Week:     Minutes of Exercise per Session:    Stress:     Feeling of Stress :    Social Connections:     Frequency of Communication with Friends and Family:     Frequency of Social Gatherings with Friends and Family:     Attends Restoration Services:     Active Member of Clubs or Organizations:     Attends Club or Organization Meetings:     Marital Status:    Intimate Partner Violence:     Fear of Current or Ex-Partner:     Emotionally Abused:     Physically Abused:     Sexually Abused:        PHYSICAL EXAM    VITAL SIGNS: BP (!) 102/41   Pulse 75   Temp 98.4 °F (36.9 °C) (Oral)   Resp 20   Ht 5' 4\" (1.626 m)   Wt 263 lb (119.3 kg)   SpO2 97%   BMI 45.14 kg/m²   Constitutional:  Well developed, well nourished, no acute distress, non-toxic appearance   HENT:  Atraumatic, external ears normal, nose normal, oropharynx moist.  Neck- normal range of motion, no tenderness, supple   Respiratory:  No respiratory distress, normal breath sounds. Cardiovascular:  Normal rate, normal rhythm, no murmurs, no gallops, no rubs   GI:  Soft, nondistended, normal bowel sounds, nontender   Musculoskeletal: Left ankle with lateral malleolar swelling. Tenderness to palpation. Able to bear weight. Integument:  Well hydrated, no rash   Neurologic: No focal deficits. RADIOLOGY/PROCEDURES    XR ANKLE LEFT (MIN 3 VIEWS)   Final Result      Spur on the calcaneus. Soft tissue swelling without fracture. Labs  Labs Reviewed - No data to display          Summation      Patient Course: Ace wrap is applied to the left ankle. Weightbearing as tolerated. Ibuprofen with Tylenol for the pain. Elevate and ice    ED Medications administered this visit:  Medications - No data to display    New Prescriptions from this visit:    New Prescriptions    No medications on file       Follow-up:  [facility]  Schedule an appointment as soon as possible for a visit in 1 week  As needed, If symptoms worsen        Final Impression:   1.  Sprain of left ankle, unspecified ligament, initial encounter               (Please note that portions of this note were completed with a voice recognition program.  Efforts were made to edit the dictations but occasionally words are mis-transcribed.)          Micah Marley MD  08/09/21 [id number]

## 2021-12-31 ENCOUNTER — APPOINTMENT (OUTPATIENT)
Dept: GENERAL RADIOLOGY | Age: 51
End: 2021-12-31
Payer: COMMERCIAL

## 2021-12-31 ENCOUNTER — HOSPITAL ENCOUNTER (EMERGENCY)
Age: 51
Discharge: HOME | End: 2021-12-31
Attending: FAMILY MEDICINE
Payer: COMMERCIAL

## 2021-12-31 VITALS
TEMPERATURE: 98.2 F | SYSTOLIC BLOOD PRESSURE: 121 MMHG | DIASTOLIC BLOOD PRESSURE: 84 MMHG | HEIGHT: 64 IN | WEIGHT: 251 LBS | BODY MASS INDEX: 42.85 KG/M2 | RESPIRATION RATE: 20 BRPM | HEART RATE: 88 BPM | OXYGEN SATURATION: 95 %

## 2021-12-31 DIAGNOSIS — J06.9 ACUTE UPPER RESPIRATORY INFECTION: Primary | ICD-10-CM

## 2021-12-31 PROCEDURE — 71045 X-RAY EXAM CHEST 1 VIEW: CPT

## 2021-12-31 PROCEDURE — 99283 EMERGENCY DEPT VISIT LOW MDM: CPT

## 2021-12-31 RX ORDER — TIZANIDINE 4 MG/1
2-4 TABLET ORAL 2 TIMES DAILY PRN
COMMUNITY
Start: 2021-12-06

## 2021-12-31 RX ORDER — ALBUTEROL SULFATE 90 UG/1
2 AEROSOL, METERED RESPIRATORY (INHALATION) PRN
COMMUNITY
Start: 2021-12-27

## 2021-12-31 ASSESSMENT — PAIN DESCRIPTION - PAIN TYPE: TYPE: ACUTE PAIN

## 2021-12-31 ASSESSMENT — PAIN DESCRIPTION - LOCATION: LOCATION: CHEST

## 2021-12-31 ASSESSMENT — PAIN SCALES - GENERAL: PAINLEVEL_OUTOF10: 5

## 2022-01-01 NOTE — ED PROVIDER NOTES
[address]  eMERGENCY dEPARTMENT eNCOUnter          [address] [facility]       Chief Complaint   Patient presents with    Cough     States she has been coughing for 4 days, has been bringing up \"flem, my lungs feel like the are rattling\" Deneis fever. Nurses Notes reviewed and I agree except as noted in the HPI. HISTORY OF PRESENT ILLNESS    Kiko La is a 46 y.o. female who presents to the emergency room via private vehicle, patient went have cough and some chest discomfort with cough, denies any fever, states chills for the past 2 days though I was indicates she is always cold, denies any myalgias, states sore throat earlier today, did have a posttussive vomiting event x4, denies any loss of smell or taste. Patient rates her does chest discomfort 5 out of 10 while coughing. No known sick contacts. Onset of symptoms 1 day prior. Patient had received flu and Covid vaccines this year. REVIEW OF SYSTEMS     Review of Systems   All other systems reviewed and are negative. PAST MEDICAL HISTORY    has a past medical history of Arthritis, Asthma, Bipolar disorder ([address] Bradycardia, COPD (chronic obstructive pulmonary disease) ([address] Diabetes mellitus ([address] Essential hypertension, History of diabetes mellitus, Hyperlipidemia, Mixed hyperlipidemia, JOSEPH (obstructive sleep apnea), JOSEPH on CPAP, PONV (postoperative nausea and vomiting), Sleep apnea, and Thyroid disease. SURGICAL HISTORY      has a past surgical history that includes Hysterectomy; Breast surgery; Appendectomy; Cholecystectomy;  section; hernia repair; Achilles tendon surgery (Bilateral); Varicose vein surgery (Bilateral); knee surgery (Left); other surgical history; pr knee scope,diagnostic (Left, 3/2/2017); pr knee scope,diagnostic (Right, 2017); pr total knee arthroplasty (Left, 2017);  Colonoscopy; Endoscopy, colon, diagnostic; and pr total knee arthroplasty (Right, 4/26/2018).     CURRENT MEDICATIONS       Discharge Medication List as of 12/31/2021  1:26 PM      CONTINUE these medications which have NOT CHANGED    Details   vitamin D (CHOLECALCIFEROL) 38487 UNIT CAPS Take 50,000 capsules by mouth dailyHistorical Med      albuterol sulfate  (90 Base) MCG/ACT inhaler Inhale 2 puffs into the lungs as neededHistorical Med      metFORMIN (GLUCOPHAGE) 1000 MG tablet Take 1,000 mg by mouth 2 times daily (with meals)Historical Med      insulin glargine (LANTUS;BASAGLAR) 100 UNIT/ML injection pen Inject 15 Units into the skin nightlyHistorical Med      metoprolol tartrate (LOPRESSOR) 25 MG tablet Take 25 mg by mouthHistorical Med      lurasidone (LATUDA) 60 MG TABS tablet Take 60 mg by mouthHistorical Med      Liraglutide (VICTOZA) 18 MG/3ML SOPN SC injection Inject 1.8mg once dailyHistorical Med      bumetanide (BUMEX) 1 MG tablet Take 1 mg by mouth dailyHistorical Med      atorvastatin (LIPITOR) 10 MG tablet Take 10 mg by mouth dailyHistorical Med      budesonide-formoterol (SYMBICORT) 80-4.5 MCG/ACT AERO Inhale 2 puffs into the lungs 2 times dailyHistorical Med      ranitidine (ZANTAC) 150 MG tablet Take 150 mg by mouth as neededHistorical Med      aspirin 81 MG EC tablet Take 1 tablet by mouth 2 times daily, Disp-60 tablet, R-0Print      levothyroxine (SYNTHROID) 300 MCG tablet Take 350 mcg by mouth Daily Historical Med      tiZANidine (ZANAFLEX) 4 MG tablet Take 2-4 mg by mouth 2 times daily as neededHistorical Med      EQ LUBRICANT EYE DROPS 0.4-0.3 % ophthalmic solution Apply 2 drops to eye as needed, DAWHistorical Med      acetaminophen (TYLENOL) 325 MG tablet Take 2 tablets by mouth every 6 hours, Disp-120 tablet, R-0Print      UNKNOWN TO PATIENT Take 1 tablet by mouth nightly Bipolar medicineHistorical Med      ipratropium-albuterol (DUONEB) 0.5-2.5 (3) MG/3ML SOLN nebulizer solution Take 1 vial by nebulization every 4 hours             ALLERGIES     is allergic to latex, bactrim [sulfamethoxazole-trimethoprim], and tape [adhesive tape]. FAMILY HISTORY     She indicated that her mother is . She indicated that her father is . She indicated that her sister is alive. She indicated that her brother is alive. She indicated that her daughter is alive. She indicated that her son is alive. family history includes Arthritis in her sister; Asthma in her son; Cancer in her father and mother; Diabetes in her father, mother, and sister; Thyroid Disease in her sister. SOCIAL HISTORY      reports that she quit smoking about 11 years ago. She quit after 15.00 years of use. She has never used smokeless tobacco. She reports current drug use. Drug: Marijuana Deboraha Sanes). She reports that she does not drink alcohol. PHYSICAL EXAM     INITIAL VITALS:  height is 5' 4\" (1.626 m) and weight is 251 lb (113.9 kg). Her oral temperature is 98.2 °F (36.8 °C). Her blood pressure is 121/84 and her pulse is 88. Her respiration is 20 and oxygen saturation is 95%. Physical Exam   Constitutional: Patient is oriented to person, place, and time. Patient appears well-developed and well-nourished. Patient is active and cooperative. HENT:   Head: Normocephalic and atraumatic. Head is without contusion. Right Ear: Hearing and external ear normal. No drainage. Left Ear: Hearing and external ear normal. No drainage. Nose: Nose normal. No nasal deformity. No epistaxis. Mouth/Throat: Mucous membranes are not dry. Negative oral lesions or exudate, some posterior pharyngeal erythema and cobblestoning, noted some PND  Eyes: EOMI. Conjunctivae, sclera, and lids are normal. Right eye exhibits no discharge. Left eye exhibits no discharge. Neck: Full passive range of motion without pain and phonation normal.   Cardiovascular:  Normal rate, regular rhythm and intact distal pulses. Pulses: Right radial pulse  2+   Pulmonary/Chest: Effort normal. No tachypnea and no bradypnea.  No wheezes, rhonchi, or rales. Abdominal: BMI 43.0. Soft. Patient without distension or tenderness  Musculoskeletal:   Noted patient's right foot in a walking shoe    Except as otherwise noted, negative acute trauma or deformity,  apparent full range of motion and normal strength all extremities appropriate to age. Neurological: Patient is alert and oriented to person, place, and time. patient displays no tremor. Patient displays no seizure activity. .  Lymphatic: No gross cervical lymphadenopathy  Skin: Skin is warm and dry. Patient is not diaphoretic. Psychiatric: Patient has a normal mood and affect. Patient speech is normal and behavior is normal. Cognition and memory are normal.    DIFFERENTIAL DIAGNOSIS:   Pneumonia bronchitis URI    DIAGNOSTIC RESULTS           RADIOLOGY: non-plain film images(s) such as CT, Ultrasound and MRI are read by the radiologist.  XR CHEST PORTABLE   Final Result      No active disease in the chest.                      LABS:   Labs Reviewed - No data to display    EMERGENCY DEPARTMENT COURSE:   Vitals:    Vitals:    12/31/21 1158   BP: 121/84   Pulse: 88   Resp: 20   Temp: 98.2 °F (36.8 °C)   TempSrc: Oral   SpO2: 95%   Weight: 251 lb (113.9 kg)   Height: 5' 4\" (1.626 m)     Patient history and physical exam taken at bedside, discussed patient symptoms and exam findings, discussed it with a get a chest x-ray, did discuss limitations of getting rapid Covid in the emergency room is there not been unsafe for admission or transfers, I do note patient not showing any hypoxia or tachypnea at this time, patient acknowledges. Chest x-ray reviewed    Discussed with patient diagnosis URI, with otherwise normal chest x-ray and good pulse oximetry less likely to be pneumonia or bronchitis. Discussed OTC cough cold medications with may be safe for patient, follow up with primary care, return to ER if symptoms change worse other concerns, acknowledged      FINAL IMPRESSION      1.  Acute upper respiratory infection          DISPOSITION/PLAN   D/c    PATIENT REFERRED TO:  Carolyneia Jono  [facility] [address]  [phone]    Call       [facility] ED  [address] [facility] [address] [phone]    As needed, If symptoms worsen      DISCHARGE MEDICATIONS:  Discharge Medication List as of 12/31/2021  1:26 PM              Summation      Patient Course:  D/c    ED Medications administered this visit:  Medications - No data to display    New Prescriptions from this visit:    Discharge Medication List as of 12/31/2021  1:26 PM          Follow-up:  Domitila De La Cruz  [facility] [address]  [phone]    Call       [facility] ED  [address] [facility] [id number] [phone]    As needed, If symptoms worsen        Final Impression:   1.  Acute upper respiratory infection               (Please note that portions of this note were completed with a voice recognition program.  Efforts were made to edit the dictations but occasionally words are mis-transcribed.)    MD Jan Swanson MD  01/01/22 8972

## 2022-03-21 ENCOUNTER — APPOINTMENT (OUTPATIENT)
Dept: GENERAL RADIOLOGY | Age: 52
End: 2022-03-21
Payer: COMMERCIAL

## 2022-03-21 ENCOUNTER — HOSPITAL ENCOUNTER (EMERGENCY)
Age: 52
Discharge: HOME | End: 2022-03-21
Attending: EMERGENCY MEDICINE
Payer: COMMERCIAL

## 2022-03-21 VITALS
HEIGHT: 64 IN | TEMPERATURE: 98.1 F | SYSTOLIC BLOOD PRESSURE: 82 MMHG | OXYGEN SATURATION: 94 % | WEIGHT: 265.8 LBS | HEART RATE: 87 BPM | BODY MASS INDEX: 45.38 KG/M2 | RESPIRATION RATE: 18 BRPM | DIASTOLIC BLOOD PRESSURE: 55 MMHG

## 2022-03-21 DIAGNOSIS — M72.0 DUPUYTREN CONTRACTURE: Primary | ICD-10-CM

## 2022-03-21 DIAGNOSIS — M25.512 ACUTE PAIN OF LEFT SHOULDER: ICD-10-CM

## 2022-03-21 PROCEDURE — 99283 EMERGENCY DEPT VISIT LOW MDM: CPT

## 2022-03-21 PROCEDURE — 73120 X-RAY EXAM OF HAND: CPT

## 2022-03-21 PROCEDURE — 73030 X-RAY EXAM OF SHOULDER: CPT

## 2022-03-21 RX ORDER — NAPROXEN 375 MG/1
375 TABLET ORAL 2 TIMES DAILY WITH MEALS
Qty: 20 TABLET | Refills: 1 | Status: SHIPPED | OUTPATIENT
Start: 2022-03-21

## 2022-03-21 ASSESSMENT — PAIN SCALES - GENERAL: PAINLEVEL_OUTOF10: 10

## 2022-03-21 ASSESSMENT — PAIN DESCRIPTION - ORIENTATION: ORIENTATION: LEFT

## 2022-03-21 ASSESSMENT — PAIN DESCRIPTION - LOCATION: LOCATION: HAND;SHOULDER

## 2022-03-21 ASSESSMENT — PAIN DESCRIPTION - FREQUENCY: FREQUENCY: CONTINUOUS

## 2022-03-21 ASSESSMENT — PAIN DESCRIPTION - DESCRIPTORS: DESCRIPTORS: SHARP

## 2022-03-21 ASSESSMENT — PAIN DESCRIPTION - PAIN TYPE: TYPE: ACUTE PAIN

## 2022-03-21 ASSESSMENT — PAIN - FUNCTIONAL ASSESSMENT: PAIN_FUNCTIONAL_ASSESSMENT: 0-10

## 2022-03-21 NOTE — ED NOTES
Ace bandage applied to left hand and right upper shoulder area. Patient taught to check for circulation to remove at night and to take off if it is causing pain and tingling.       Bethany Rodriges RN  03/21/22 1950

## 2022-03-21 NOTE — ED PROVIDER NOTES
eMERGENCY dEPARTMENT eNCOUnter        [address] [facility]    Chief Complaint   Patient presents with    Shoulder Pain     Pt has had left shoulder pain for past couple months, worse today    Hand Pain     Pt c/o knot to palm of left hand that she just noticed today. HPI    Helen Hauser is a 46 y.o. female who presents to ED from home. By car. With complaint of left shoulder pain. Patient states the pain started couple months ago. No injury. Today patient noticed also pain and contracture of the left hand. Patient denies injury. Patient states that her left hand feels like \"a knot\"  Intensity of symptoms moderate. Location of symptoms L hand and L shoulder. Patient denies injury.       REVIEW OF SYSTEMS    All systems reviewed and positives are in the HPI      PAST MEDICAL HISTORY    Past Medical History:   Diagnosis Date    Arthritis     Asthma     [address] [facility] [address]     Bradycardia 2018    COPD (chronic obstructive pulmonary disease) ([facility]     Diabetes mellitus ([facility]     Essential hypertension 2018    History of diabetes mellitus 2018    Hyperlipidemia     diet /exercise management    Mixed hyperlipidemia 2018    JOSEPH (obstructive sleep apnea) 2018    JOSEPH on CPAP     PONV (postoperative nausea and vomiting)     Sleep apnea     Thyroid disease        SURGICAL HISTORY    Past Surgical History:   Procedure Laterality Date    ACHILLES TENDON SURGERY Bilateral     APPENDECTOMY      BREAST SURGERY       SECTION      x2    CHOLECYSTECTOMY      COLONOSCOPY      ENDOSCOPY, COLON, DIAGNOSTIC      HERNIA REPAIR      HYSTERECTOMY      KNEE SURGERY Left     meniscus    OTHER SURGICAL HISTORY      scar tissue removal from left foot     MD KNEE SCOPE,DIAGNOSTIC Left 3/2/2017    LEFT KNEE ARTHROSCOPIC PARTIAL MEDIAL MENISCECTOMY , LATEX ALLERGY  performed by Dusty Brown MD at [facility] Right 2017    RIGHT KNEE UNKNOWN TO PATIENT Take 1 tablet by mouth nightly Bipolar medicine      ipratropium-albuterol (DUONEB) 0.5-2.5 (3) MG/3ML SOLN nebulizer solution Take 1 vial by nebulization every 4 hours      levothyroxine (SYNTHROID) 300 MCG tablet Take 350 mcg by mouth Daily          ALLERGIES    Allergies   Allergen Reactions    Latex Rash    Bactrim [Sulfamethoxazole-Trimethoprim] Rash    Tape Ludwin Spitz Tape] Rash       FAMILY HISTORY    Family History   Problem Relation Age of Onset    Cancer Mother     Diabetes Mother     Cancer Father     Diabetes Father     Diabetes Sister     Thyroid Disease Sister     Arthritis Sister     Asthma Son        SOCIAL HISTORY    Social History     Socioeconomic History    Marital status:      Spouse name: None    Number of children: None    Years of education: None    Highest education level: None   Occupational History    None   Tobacco Use    Smoking status: Former Smoker     Years: 15.00     Quit date: 2010     Years since quittin.3    Smokeless tobacco: Never Used   Vaping Use    Vaping Use: Never used   Substance and Sexual Activity    Alcohol use: No    Drug use: Yes     Types: Marijuana Barbara Bachelor)     Comment: medical     Sexual activity: None   Other Topics Concern    None   Social History Narrative    None     Social Determinants of Health     Financial Resource Strain:     Difficulty of Paying Living Expenses: Not on file   Food Insecurity:     Worried About Running Out of Food in the Last Year: Not on file    Marsha of Food in the Last Year: Not on file   Transportation Needs:     Lack of Transportation (Medical): Not on file    Lack of Transportation (Non-Medical):  Not on file   Physical Activity:     Days of Exercise per Week: Not on file    Minutes of Exercise per Session: Not on file   Stress:     Feeling of Stress : Not on file   Social Connections:     Frequency of Communication with Friends and Family: Not on file    Frequency of Social Gatherings with Friends and Family: Not on file    Attends Quaker Services: Not on file    Active Member of Clubs or Organizations: Not on file    Attends Club or Organization Meetings: Not on file    Marital Status: Not on file   Intimate Partner Violence:     Fear of Current or Ex-Partner: Not on file    Emotionally Abused: Not on file    Physically Abused: Not on file    Sexually Abused: Not on file   Housing Stability:     Unable to Pay for Housing in the Last Year: Not on file    Number of [place] in the Last Year: Not on file    Unstable Housing in the Last Year: Not on file       PHYSICAL EXAM    VITAL SIGNS: BP (!) 82/55   Pulse 87   Temp 98.1 °F (36.7 °C) (Oral)   Resp 18   Ht 5' 4\" (1.626 m)   Wt 265 lb 12.8 oz (120.6 kg)   SpO2 94%   BMI 45.62 kg/m²   Constitutional:  Well developed, well nourished, no acute distress, non-toxic appearance   HENT:  Atraumatic, external ears normal, nose normal, oropharynx moist.  Neck- normal range of motion, no tenderness, supple   Respiratory:  No respiratory distress, normal breath sounds. Cardiovascular:  Normal rate, normal rhythm, no murmurs, no gallops, no rubs   GI:  Soft, nondistended, normal bowel sounds, nontender   Musculoskeletal: Left shoulder pain. Patient has good range of motion left shoulder. No deformity   Left hand with symptoms of mild Dupuytren contracture  integument:  Well hydrated, no rash   Neurologic: Alert and oriented x3 no focal deficits    RADIOLOGY/PROCEDURES    XR SHOULDER LEFT (MIN 2 VIEWS)   Preliminary Result   Preliminary report only      IMPRESSION:       Moderate degenerative change acromioclavicular joint. There is some    undersurface spurring that could cause mild outlet impingement. XR HAND LEFT (2 VIEWS)    (Results Pending)         Labs  Labs Reviewed - No data to display          Summation      Patient Course: X-rays are negative for fractures.  Ace wrap applied to the left hand. The patient will be sent home. Naprosyn twice daily as prescribed. Follow-up with Ortho if not better in a week. Return to ED if worse    ED Medications administered this visit:  Medications - No data to display    New Prescriptions from this visit:    New Prescriptions    NAPROXEN (NAPROSYN) 375 MG TABLET    Take 1 tablet by mouth 2 times daily (with meals)       Follow-up:  [address] [facility] [address]  Schedule an appointment as soon as possible for a visit in 1 week  As needed, If symptoms worsen        Final Impression:   1. Dupuytren contracture    2.  Acute pain of left shoulder               (Please note that portions of this note were completed with a voice recognition program.  Efforts were made to edit the dictations but occasionally words are mis-transcribed.)          Laura Singh MD  03/21/22 0260

## 2022-04-28 ENCOUNTER — HOSPITAL ENCOUNTER (OUTPATIENT)
Dept: MRI IMAGING | Age: 52
Discharge: HOME | End: 2022-04-30
Payer: COMMERCIAL

## 2022-04-28 DIAGNOSIS — M19.012 ARTHRITIS OF LEFT SHOULDER REGION: ICD-10-CM

## 2022-04-28 DIAGNOSIS — M75.42 IMPINGEMENT SYNDROME OF LEFT SHOULDER: ICD-10-CM

## 2022-04-28 PROCEDURE — 73221 MRI JOINT UPR EXTREM W/O DYE: CPT

## 2022-04-29 ENCOUNTER — HOSPITAL ENCOUNTER (EMERGENCY)
Age: 52
Discharge: HOME | End: 2022-04-29
Attending: FAMILY MEDICINE
Payer: COMMERCIAL

## 2022-04-29 ENCOUNTER — APPOINTMENT (OUTPATIENT)
Dept: GENERAL RADIOLOGY | Age: 52
End: 2022-04-29
Payer: COMMERCIAL

## 2022-04-29 VITALS
HEART RATE: 71 BPM | OXYGEN SATURATION: 96 % | BODY MASS INDEX: 45.38 KG/M2 | TEMPERATURE: 98.1 F | SYSTOLIC BLOOD PRESSURE: 100 MMHG | RESPIRATION RATE: 20 BRPM | DIASTOLIC BLOOD PRESSURE: 67 MMHG | WEIGHT: 264.4 LBS

## 2022-04-29 DIAGNOSIS — M25.511 RIGHT SHOULDER PAIN, UNSPECIFIED CHRONICITY: ICD-10-CM

## 2022-04-29 DIAGNOSIS — M75.21 BICIPITAL TENDONITIS OF RIGHT SHOULDER: Primary | ICD-10-CM

## 2022-04-29 PROCEDURE — 99283 EMERGENCY DEPT VISIT LOW MDM: CPT

## 2022-04-29 PROCEDURE — 6370000000 HC RX 637 (ALT 250 FOR IP): Performed by: FAMILY MEDICINE

## 2022-04-29 PROCEDURE — 73030 X-RAY EXAM OF SHOULDER: CPT

## 2022-04-29 RX ORDER — HYDROCODONE BITARTRATE AND ACETAMINOPHEN 5; 325 MG/1; MG/1
1 TABLET ORAL EVERY 6 HOURS PRN
Qty: 8 TABLET | Refills: 0 | Status: SHIPPED | OUTPATIENT
Start: 2022-04-29 | End: 2022-05-02

## 2022-04-29 ASSESSMENT — PAIN SCALES - GENERAL: PAINLEVEL_OUTOF10: 10

## 2022-04-29 ASSESSMENT — PAIN DESCRIPTION - ORIENTATION: ORIENTATION: RIGHT

## 2022-04-29 ASSESSMENT — PAIN - FUNCTIONAL ASSESSMENT: PAIN_FUNCTIONAL_ASSESSMENT: 0-10

## 2022-04-29 ASSESSMENT — PAIN DESCRIPTION - LOCATION: LOCATION: SHOULDER

## 2022-04-30 NOTE — ED PROVIDER NOTES
[facility]  eMERGENCY dEPARTMENT eNCOUnter          [facility]       Chief Complaint   Patient presents with    Shoulder Pain     Patient has history of shoulder pain. She has bad shoulders. She recently had a MRI on her left shoulder but today she is having bad right shou;dharmesh pains now. Patient states \" I felt it pop last night\"       Nurses Notes reviewed and I agree except as noted in the HPI. HISTORY OF PRESENT ILLNESS    Raghu Jasso is a 46 y.o. female who presents the emergency room via private vehicle, patient complaining of right shoulder pain, patient states she is a history of chronic bilateral shoulder pain, recently having MRI of her left shoulder, but now she is having pain on the right side, patient states last night it was \"out of socket\", her  had put it back in, and since that time she is had continued pain. Patient states pain worse with any movement, indicates that she cannot raise her arm above 60 degrees and abduction, with pain similar with flexion or extension. Denies any trauma or falls denies any direct blows to the shoulder. She rates her pain 10 out of 10    REVIEW OF SYSTEMS     Review of Systems   All other systems reviewed and are negative. PAST MEDICAL HISTORY    has a past medical history of Arthritis, Asthma, Bipolar disorder ([address] Bradycardia, COPD (chronic obstructive pulmonary disease) ([address] Diabetes mellitus ([address] Essential hypertension, History of diabetes mellitus, Hyperlipidemia, Mixed hyperlipidemia, JOSEPH (obstructive sleep apnea), JOSEPH on CPAP, PONV (postoperative nausea and vomiting), Sleep apnea, and Thyroid disease. SURGICAL HISTORY      has a past surgical history that includes Hysterectomy; Breast surgery; Appendectomy; Cholecystectomy;   section; hernia repair; Achilles tendon surgery (Bilateral); Varicose vein surgery (Bilateral); knee surgery (Left); other surgical history; pr knee scope,diagnostic (Left, 3/2/2017); pr knee scope,diagnostic (Right, 6/8/2017); pr total knee arthroplasty (Left, 12/28/2017); Colonoscopy; Endoscopy, colon, diagnostic; and pr total knee arthroplasty (Right, 4/26/2018).     CURRENT MEDICATIONS       Discharge Medication List as of 4/29/2022  9:04 PM      CONTINUE these medications which have NOT CHANGED    Details   naproxen (NAPROSYN) 375 MG tablet Take 1 tablet by mouth 2 times daily (with meals), Disp-20 tablet, R-1Normal      vitamin D (CHOLECALCIFEROL) 19301 UNIT CAPS Take 50,000 capsules by mouth dailyHistorical Med      albuterol sulfate  (90 Base) MCG/ACT inhaler Inhale 2 puffs into the lungs as neededHistorical Med      metFORMIN (GLUCOPHAGE) 1000 MG tablet Take 1,000 mg by mouth 2 times daily (with meals)Historical Med      insulin glargine (LANTUS;BASAGLAR) 100 UNIT/ML injection pen Inject 15 Units into the skin nightlyHistorical Med      tiZANidine (ZANAFLEX) 4 MG tablet Take 2-4 mg by mouth 2 times daily as neededHistorical Med      metoprolol tartrate (LOPRESSOR) 25 MG tablet Take 25 mg by mouthHistorical Med      lurasidone (LATUDA) 60 MG TABS tablet Take 60 mg by mouthHistorical Med      Liraglutide (VICTOZA) 18 MG/3ML SOPN SC injection Inject 1.8mg once dailyHistorical Med      bumetanide (BUMEX) 1 MG tablet Take 1 mg by mouth dailyHistorical Med      atorvastatin (LIPITOR) 10 MG tablet Take 10 mg by mouth dailyHistorical Med      budesonide-formoterol (SYMBICORT) 80-4.5 MCG/ACT AERO Inhale 2 puffs into the lungs 2 times dailyHistorical Med      EQ LUBRICANT EYE DROPS 0.4-0.3 % ophthalmic solution Apply 2 drops to eye as needed, DAWHistorical Med      ranitidine (ZANTAC) 150 MG tablet Take 150 mg by mouth as neededHistorical Med      acetaminophen (TYLENOL) 325 MG tablet Take 2 tablets by mouth every 6 hours, Disp-120 tablet, R-0Print      aspirin 81 MG EC tablet Take 1 tablet by mouth 2 times daily, Disp-60 tablet, R-0Print      UNKNOWN TO PATIENT Take 1 tablet by mouth nightly Bipolar medicineHistorical Med      ipratropium-albuterol (DUONEB) 0.5-2.5 (3) MG/3ML SOLN nebulizer solution Take 1 vial by nebulization every 4 hours      levothyroxine (SYNTHROID) 300 MCG tablet Take 350 mcg by mouth Daily Historical Med             ALLERGIES     is allergic to latex, bactrim [sulfamethoxazole-trimethoprim], and tape [adhesive tape]. FAMILY HISTORY     She indicated that her mother is . She indicated that her father is . She indicated that her sister is alive. She indicated that her brother is alive. She indicated that her daughter is alive. She indicated that her son is alive. family history includes Arthritis in her sister; Asthma in her son; Cancer in her father and mother; Diabetes in her father, mother, and sister; Thyroid Disease in her sister. SOCIAL HISTORY      reports that she quit smoking about 11 years ago. She quit after 15.00 years of use. She has never used smokeless tobacco. She reports current drug use. Drug: Marijuana Garon Kettle). She reports that she does not drink alcohol. PHYSICAL EXAM     INITIAL VITALS:  weight is 264 lb 6.4 oz (119.9 kg). Her oral temperature is 98.1 °F (36.7 °C). Her blood pressure is 100/67 and her pulse is 71. Her respiration is 20 and oxygen saturation is 96%. Physical Exam   Constitutional: Patient is oriented to person, place, and time. Patient appears well-developed and well-nourished. Patient is active and cooperative. HENT:   Head: Normocephalic and atraumatic. Head is without contusion. Right Ear: Hearing and external ear normal. No drainage. Left Ear: Hearing and external ear normal. No drainage. Nose: Nose normal. No nasal deformity. No epistaxis. Mouth/Throat: Mucous membranes are not dry. Eyes: EOMI. Conjunctivae, sclera, and lids are normal. Right eye exhibits no discharge. Left eye exhibits no discharge.    Neck: Full passive range of motion without pain and phonation normal. Cardiovascular:  Normal rate, regular rhythm and intact distal pulses. Pulses: Right radial pulse  2+   Pulmonary/Chest: Effort normal. No tachypnea and no bradypnea. Abdominal: BMI 45.3, soft. Patient without distension   Musculoskeletal:   Focused examination patient's right shoulder shows tenderness with movement throughout the shoulder, limited range of motion no more than 60 degrees in abduction flexion or extension, there is some pain to palpation the clavicle right ACM, there is marked pain over the lateral midline aspect of the deltoid consistent with the long arm of the biceps tendon, less so with a short arm of the biceps tendon insertion point, distal CSM intact    Except as otherwise noted, negative acute trauma or deformity,  apparent full range of motion and normal strength all extremities appropriate to age. Neurological: Patient is alert and oriented to person, place, and time. patient displays no tremor. Patient displays no seizure activity. .    Skin: Skin is warm and dry. Patient is not diaphoretic. Psychiatric: Patient has a normal mood and affect. Patient speech is normal and behavior is normal. Cognition and memory are normal.    DIFFERENTIAL DIAGNOSIS:   Fracture dislocation sprain strain contusion tendinitis bursitis    DIAGNOSTIC RESULTS         RADIOLOGY: non-plain film images(s) such as CT, Ultrasound and MRI are read by the radiologist.  XR SHOULDER RIGHT (MIN 2 VIEWS)   Final Result      No acute bony abnormality.              LABS:   Labs Reviewed - No data to display    EMERGENCY DEPARTMENT COURSE:   Vitals:    Vitals:    04/29/22 1947   BP: 100/67   Pulse: 71   Resp: 20   Temp: 98.1 °F (36.7 °C)   TempSrc: Oral   SpO2: 96%   Weight: 264 lb 6.4 oz (119.9 kg)     Patient history and physical exam taken patient sitting upright in chair, discussed patient symptoms and exam findings, discussed getting x-rays of the right shoulder and will reevaluate, patient acknowledges    OARRS = 230, last narcotic prescription for Percocet #10 1/27/2022 by podiatry    Imaging reviewed    Patient's right shoulder reexamined after reviewing imaging, this patient may have some bicipital tendinitis over the long arm of the bicep, as well as some acid right shoulder pain which may be bursitis versus tendinitis, at this time I feel we can safely discharge patient home, I would strongly advise patient not to use any type of sling specially in light of likely inflammation in the shoulder joint, discussed ice versus heat as desired being careful not to burn the skin, Motrin/Tylenol for baseline pain control, will give prescription for Norco for any breakthrough pain, patient is established with Dr. Cortez Cruz orthopedics and advised follow-up, acknowledged    FINAL IMPRESSION      1. Bicipital tendonitis of right shoulder    2. Right shoulder pain, unspecified chronicity          DISPOSITION/PLAN   D/c    PATIENT REFERRED TO:  Mesha Humphries DO  [address] [place] [address]  [phone]    Call         DISCHARGE MEDICATIONS:  Discharge Medication List as of 4/29/2022  9:04 PM      START taking these medications    Details   HYDROcodone-acetaminophen (NORCO) 5-325 MG per tablet Take 1 tablet by mouth every 6 hours as needed for Pain for up to 3 days. Intended supply: 3 days. Take lowest dose possible to manage pain, Disp-8 tablet, R-0Print                 Summation      Patient Course:  D/c    ED Medications administered this visit:  Medications - No data to display    New Prescriptions from this visit:    Discharge Medication List as of 4/29/2022  9:04 PM      START taking these medications    Details   HYDROcodone-acetaminophen (NORCO) 5-325 MG per tablet Take 1 tablet by mouth every 6 hours as needed for Pain for up to 3 days. Intended supply: 3 days.  Take lowest dose possible to manage pain, Disp-8 tablet, R-0Print             Follow-up:  Mesha Humphries DO  [facility], [address]  [phone]    Call           Final Impression:   1. Bicipital tendonitis of right shoulder    2.  Right shoulder pain, unspecified chronicity               (Please note that portions of this note were completed with a voice recognition program.  Efforts were made to edit the dictations but occasionally words are mis-transcribed.)    MD Arina Nunez MD  04/30/22 [id number]

## 2022-07-11 ENCOUNTER — HOSPITAL ENCOUNTER (EMERGENCY)
Age: 52
Discharge: HOME | End: 2022-07-11
Attending: EMERGENCY MEDICINE
Payer: COMMERCIAL

## 2022-07-11 VITALS
BODY MASS INDEX: 43.54 KG/M2 | OXYGEN SATURATION: 96 % | HEART RATE: 82 BPM | HEIGHT: 64 IN | WEIGHT: 255 LBS | DIASTOLIC BLOOD PRESSURE: 99 MMHG | SYSTOLIC BLOOD PRESSURE: 147 MMHG | TEMPERATURE: 98.3 F | RESPIRATION RATE: 18 BRPM

## 2022-07-11 DIAGNOSIS — S66.911A STRAIN OF RIGHT HAND, INITIAL ENCOUNTER: Primary | ICD-10-CM

## 2022-07-11 PROCEDURE — 99282 EMERGENCY DEPT VISIT SF MDM: CPT

## 2022-07-11 RX ORDER — ASPIRIN 81 MG/1
81 TABLET, CHEWABLE ORAL DAILY
COMMUNITY
Start: 2022-01-27 | End: 2022-07-11

## 2022-07-11 ASSESSMENT — PAIN DESCRIPTION - DESCRIPTORS: DESCRIPTORS: SHOOTING;SHARP

## 2022-07-11 ASSESSMENT — PAIN SCALES - GENERAL: PAINLEVEL_OUTOF10: 4

## 2022-07-11 ASSESSMENT — PAIN DESCRIPTION - LOCATION: LOCATION: HAND

## 2022-07-11 ASSESSMENT — PAIN DESCRIPTION - ORIENTATION: ORIENTATION: RIGHT

## 2022-07-11 ASSESSMENT — PAIN - FUNCTIONAL ASSESSMENT: PAIN_FUNCTIONAL_ASSESSMENT: 0-10

## 2023-03-17 ENCOUNTER — HOSPITAL ENCOUNTER (EMERGENCY)
Age: 53
Discharge: HOME | End: 2023-03-17
Attending: FAMILY MEDICINE
Payer: COMMERCIAL

## 2023-03-17 ENCOUNTER — APPOINTMENT (OUTPATIENT)
Dept: GENERAL RADIOLOGY | Age: 53
End: 2023-03-17
Payer: COMMERCIAL

## 2023-03-17 DIAGNOSIS — R07.9 CHEST PAIN, UNSPECIFIED TYPE: Primary | ICD-10-CM

## 2023-03-17 DIAGNOSIS — R79.1 SUBTHERAPEUTIC INTERNATIONAL NORMALIZED RATIO (INR): ICD-10-CM

## 2023-03-17 LAB
ABSOLUTE EOS #: 0.2 K/UL (ref 0–0.4)
ABSOLUTE LYMPH #: 3 K/UL (ref 1–4.8)
ABSOLUTE MONO #: 0.5 K/UL (ref 0–1)
ANION GAP SERPL CALCULATED.3IONS-SCNC: 11 MMOL/L (ref 9–17)
BASOPHILS # BLD: 1 % (ref 0–2)
BASOPHILS ABSOLUTE: 0.1 K/UL (ref 0–0.2)
BUN SERPL-MCNC: 20 MG/DL (ref 6–20)
CALCIUM SERPL-MCNC: 9.4 MG/DL (ref 8.6–10.4)
CHLORIDE SERPL-SCNC: 105 MMOL/L (ref 98–107)
CO2 SERPL-SCNC: 26 MMOL/L (ref 20–31)
CREAT SERPL-MCNC: 0.76 MG/DL (ref 0.5–0.9)
DIFFERENTIAL TYPE: YES
EOSINOPHILS RELATIVE PERCENT: 3 % (ref 0–5)
GFR SERPL CREATININE-BSD FRML MDRD: >60 ML/MIN/1.73M2
GLUCOSE SERPL-MCNC: 108 MG/DL (ref 70–99)
HCT VFR BLD AUTO: 40.9 % (ref 36–46)
HGB BLD-MCNC: 13.9 G/DL (ref 12–16)
INR PPP: 1
LYMPHOCYTES # BLD: 35 % (ref 15–40)
MCH RBC QN AUTO: 29.3 PG (ref 26–34)
MCHC RBC AUTO-ENTMCNC: 34 G/DL (ref 31–37)
MCV RBC AUTO: 86.1 FL (ref 80–100)
MONOCYTES # BLD: 6 % (ref 4–8)
PDW BLD-RTO: 14.5 % (ref 12.1–15.2)
PLATELET # BLD AUTO: 270 K/UL (ref 140–450)
POTASSIUM SERPL-SCNC: 4.1 MMOL/L (ref 3.7–5.3)
PROTHROMBIN TIME: 13.3 SEC (ref 11.5–14.2)
RBC # BLD: 4.75 M/UL (ref 4–5.2)
SEG NEUTROPHILS: 55 % (ref 47–75)
SEGMENTED NEUTROPHILS ABSOLUTE COUNT: 4.8 K/UL (ref 2.5–7)
SODIUM SERPL-SCNC: 142 MMOL/L (ref 135–144)
TROPONIN I SERPL DL<=0.01 NG/ML-MCNC: 8 NG/L (ref 0–14)
TROPONIN I SERPL DL<=0.01 NG/ML-MCNC: <6 NG/L (ref 0–14)
WBC # BLD AUTO: 8.6 K/UL (ref 3.5–11)

## 2023-03-17 PROCEDURE — 85610 PROTHROMBIN TIME: CPT

## 2023-03-17 PROCEDURE — 99285 EMERGENCY DEPT VISIT HI MDM: CPT

## 2023-03-17 PROCEDURE — 85025 COMPLETE CBC W/AUTO DIFF WBC: CPT

## 2023-03-17 PROCEDURE — 6370000000 HC RX 637 (ALT 250 FOR IP): Performed by: FAMILY MEDICINE

## 2023-03-17 PROCEDURE — 93005 ELECTROCARDIOGRAM TRACING: CPT | Performed by: FAMILY MEDICINE

## 2023-03-17 PROCEDURE — 71045 X-RAY EXAM CHEST 1 VIEW: CPT

## 2023-03-17 PROCEDURE — 84484 ASSAY OF TROPONIN QUANT: CPT

## 2023-03-17 PROCEDURE — 36415 COLL VENOUS BLD VENIPUNCTURE: CPT

## 2023-03-17 PROCEDURE — 80048 BASIC METABOLIC PNL TOTAL CA: CPT

## 2023-03-17 RX ORDER — GABAPENTIN 300 MG/1
300 CAPSULE ORAL 3 TIMES DAILY
COMMUNITY
Start: 2022-07-14

## 2023-03-17 RX ORDER — WARFARIN SODIUM 1 MG/1
1 TABLET ORAL
COMMUNITY

## 2023-03-17 RX ORDER — ASPIRIN 81 MG/1
324 TABLET, CHEWABLE ORAL ONCE
Status: COMPLETED | OUTPATIENT
Start: 2023-03-17 | End: 2023-03-17

## 2023-03-17 RX ADMIN — ASPIRIN 81 MG 324 MG: 81 TABLET ORAL at 16:26

## 2023-03-17 ASSESSMENT — LIFESTYLE VARIABLES: HOW OFTEN DO YOU HAVE A DRINK CONTAINING ALCOHOL: NEVER

## 2023-03-17 ASSESSMENT — HEART SCORE: ECG: 0

## 2023-03-17 NOTE — ED PROVIDER NOTES
[address]      Pt Name: Caitlin Laura  MRN: [id number]  [place] 1970  Date of evaluation: 3/17/2023  Provider: Johnathan Cai MD    CHIEF COMPLAINT       Chief Complaint   Patient presents with    Chest Pain     Chest pain and tightness 10 minutes pta         HISTORY OF PRESENT ILLNESS      Caitlin Laura is a 45 y.o. female who presents to the emergency department via private vehicle, patient may have chest pain, onset to minutes prior to arrival, patient began in the chest discomfort and hitting substernal and radiating back-and-forth across her chest, describing a tight feeling, states breathing little harder, denies any nausea denies diaphoresis. Patient stopped at [employer] to let her son know that she was going to the ER to get checked, and grabbed a iced coffee en route. Patient does state cardiac history including having a stress test and heart cath performed in [address] 6 to 8 months ago. Patient states was recently diagnosed with an irregular heartbeat and started on a blood thinner. REVIEW OF SYSTEMS       Review of Systems   Cardiovascular:  Positive for chest pain. All other systems reviewed and are negative.       PAST MEDICAL HISTORY     Past Medical History:   Diagnosis Date    Arthritis     Asthma     Bipolar disorder ([address]     Bradycardia 2018    COPD (chronic obstructive pulmonary disease) ([facility]     Diabetes mellitus ([facility]     Essential hypertension 2018    History of diabetes mellitus 2018    Hyperlipidemia     diet /exercise management    Mixed hyperlipidemia 2018    JOSEPH (obstructive sleep apnea) 2018    JOSEPH on CPAP     PONV (postoperative nausea and vomiting)     Sleep apnea     Thyroid disease          SURGICAL HISTORY       Past Surgical History:   Procedure Laterality Date    ACHILLES TENDON SURGERY Bilateral     APPENDECTOMY      BREAST SURGERY       SECTION      x2    CHOLECYSTECTOMY COLONOSCOPY      ENDOSCOPY, COLON, DIAGNOSTIC      HERNIA REPAIR      HYSTERECTOMY (CERVIX STATUS UNKNOWN)      KNEE SURGERY Left     meniscus    OTHER SURGICAL HISTORY      scar tissue removal from left foot     [place] ARTHROSCOPY KNEE DIAGNOSTIC W/WO SYNOVIAL BX SPX Left 3/2/2017    LEFT KNEE ARTHROSCOPIC PARTIAL MEDIAL MENISCECTOMY , LATEX ALLERGY  performed by Yony Moraes MD at [address] [place] [address] ARTHROSCOPY KNEE DIAGNOSTIC W/WO SYNOVIAL BX SPX Right 6/8/2017    RIGHT KNEE ARTHROSCOPY CHONDROPLASTY, LATEX ALLERGY  performed by Yony Moraes MD at [facility] CONDYLE&PLATU MEDIAL&LAT COMPARTMENTS Left 12/28/2017    LEFT  KNEE TOTAL KNEE ARTHROPLASTY performed by Yony Moraes MD at [facility] [address] CONDYLE&PLATU MEDIAL&LAT COMPARTMENTS Right 4/26/2018    RIGHT KNEE TOTAL KNEE  ARTHROPLASTY, CLAUDIO CR UNCEMENTED, NERVE BLOCK, LATEX ALLERGY performed by Yony Moraes MD at [facility] Bilateral          CURRENT MEDICATIONS       Discharge Medication List as of 3/17/2023  5:50 PM        CONTINUE these medications which have NOT CHANGED    Details   gabapentin (NEURONTIN) 300 MG capsule Take 300 mg by mouth 3 times daily. Historical Med      metFORMIN (GLUCOPHAGE) 1000 MG tablet Take 1,000 mg by mouth 2 times dailyHistorical Med      warfarin (COUMADIN) 1 MG tablet Take 1 mg by mouth Unknown doseHistorical Med      naproxen (NAPROSYN) 375 MG tablet Take 1 tablet by mouth 2 times daily (with meals), Disp-20 tablet, R-1Normal      vitamin D (CHOLECALCIFEROL) 66240 UNIT CAPS Take 50,000 capsules by mouth dailyHistorical Med      albuterol sulfate  (90 Base) MCG/ACT inhaler Inhale 2 puffs into the lungs as neededHistorical Med      insulin glargine (LANTUS;BASAGLAR) 100 UNIT/ML injection pen Inject 15 Units into the skin nightlyHistorical Med      tiZANidine (ZANAFLEX) 4 MG tablet Take 2-4 mg by mouth 2 times daily as neededHistorical Med metoprolol tartrate (LOPRESSOR) 25 MG tablet Take 25 mg by mouthHistorical Med      lurasidone (LATUDA) 60 MG TABS tablet Take 60 mg by mouthHistorical Med      Liraglutide (VICTOZA) 18 MG/3ML SOPN SC injection Inject 1.8mg once dailyHistorical Med      bumetanide (BUMEX) 1 MG tablet Take 1 mg by mouth dailyHistorical Med      atorvastatin (LIPITOR) 10 MG tablet Take 10 mg by mouth dailyHistorical Med      budesonide-formoterol (SYMBICORT) 80-4.5 MCG/ACT AERO Inhale 2 puffs into the lungs 2 times dailyHistorical Med      EQ LUBRICANT EYE DROPS 0.4-0.3 % ophthalmic solution Apply 2 drops to eye as needed, DAWHistorical Med      ranitidine (ZANTAC) 150 MG tablet Take 150 mg by mouth as neededHistorical Med      acetaminophen (TYLENOL) 325 MG tablet Take 2 tablets by mouth every 6 hours, Disp-120 tablet, R-0Print      aspirin 81 MG EC tablet Take 1 tablet by mouth 2 times daily, Disp-60 tablet, R-0Print      UNKNOWN TO PATIENT Take 1 tablet by mouth nightly Bipolar medicineHistorical Med      ipratropium-albuterol (DUONEB) 0.5-2.5 (3) MG/3ML SOLN nebulizer solution Take 1 vial by nebulization every 4 hours      levothyroxine (SYNTHROID) 300 MCG tablet Take 200 mcg by mouth DailyHistorical Med             ALLERGIES       Latex, Bactrim [sulfamethoxazole-trimethoprim], and Tape Uri.Mallie tape]    FAMILY HISTORY       Family History   Problem Relation Age of Onset    Cancer Mother     Diabetes Mother     Cancer Father     Diabetes Father     Diabetes Sister     Thyroid Disease Sister     Arthritis Sister     Asthma Son           SOCIAL HISTORY       Social History     Tobacco Use    Smoking status: Former     Years: 15.00     Types: Cigarettes     Quit date: 2010     Years since quittin.3    Smokeless tobacco: Never   Vaping Use    Vaping Use: Never used   Substance Use Topics    Alcohol use: No    Drug use: Not Currently     Types: Marijuana Annita Hodgson)     Comment: medical          PHYSICAL EXAM       ED Triage Vitals   BP Temp Temp src Pulse Resp SpO2 Height Weight   -- -- -- -- -- -- -- --       Physical Exam  Physical Exam   Constitutional: Patient is oriented to person, place, and time. Patient appears well-developed and well-nourished. Patient is active and cooperative. HENT:   Head: Normocephalic and atraumatic. Head is without contusion. Right Ear: Hearing and external ear normal. No drainage. Left Ear: Hearing and external ear normal. No drainage. Nose: Nose normal. No nasal deformity. No epistaxis. Mouth/Throat: Mucous membranes are not dry. Eyes: EOMI. Conjunctivae, sclera, and lids are normal. Right eye exhibits no discharge. Left eye exhibits no discharge. Neck: Full passive range of motion without pain and phonation normal. Negative JVD. Cardiovascular:   Normal rate, regular rhythm and intact distal pulses. No edema. Negative Griselda's sign bilaterally. Pulses: Radial pulse is 2+   Pulmonary/Chest: Effort normal.   No tachypnea and no bradypnea. No wheezes, rhonchi, rales, or stridor. Abdominal: BMI 43.7, soft. Patient without distension or tenderness  Musculoskeletal:   Negative acute trauma or deformity,  apparent full range of motion and normal strength all extremities appropriate to age. Neurological: Patient is alert and oriented to person, place, and time. patient displays no tremor. Patient displays no seizure activity. Skin: Skin is warm and dry. Patient is not diaphoretic. Psychiatric: Patient has a normal mood and affect.  Patient speech is normal and behavior is normal. Cognition and memory are normal.      DIAGNOSTIC RESULTS     EKG:   EKG @ 1631 hrs -sinus rhythm rate 69, left axis, normal intervals, as compared to prior EKG 11/18/2020, no significant changes morphology    EKG @ 1732 hrs -sinus bradycardia rate 58, left axis, normal intervals    RADIOLOGY:     Interpretation per the Radiologist below, if available at the time of this note:    XR CHEST PORTABLE   Final Result FINDINGS/IMPRESSION:      1. Normal sized heart. 2. Clear lungs. LABS:  Labs Reviewed   BASIC METABOLIC PANEL W/ REFLEX TO MG FOR LOW K - Abnormal; Notable for the following components:       Result Value    Glucose 108 (*)     All other components within normal limits   CBC WITH AUTO DIFFERENTIAL   TROPONIN   PROTIME-INR   TROPONIN       All other labs were within normal range or not returned as of this dictation. MIPS    Not applicable      EMERGENCY DEPARTMENT COURSE and DIFFERENTIAL DIAGNOSIS/MDM:     Patient history and physical exam taken at bedside, discussed patient's symptoms and exam findings as well as initial plan of workup to include EKG, chest x-ray, blood work with saline lock insertion, and chewable aspirin. Patient placed on cardiac monitor and continuous pulse oximetry resting semi-Fowlers in bed. EKG as above. Patient was updated on her EKG findings, she states pain has improved since she has been here though still lingering, advised her I will try to contact [facility] in [address] to get her stated recent stress test and a more recent EKG for comparison, patient acknowledges    Chest xray as above. Initial lab workup reviewed, noting hs-Aurelio of 8    HEART Score = 4    Discussed with patient and her initial work-up including EKG imaging and labs, awaiting fax report from [facility], will get 1 hour troponin, patient acknowledges    Received fax from [facility], patient with a Peggi Hazy stress test 8/11/2021, with noted final conclusion being indeterminate, with slight reversible changes of the anterior wall seen in 2 views all this may be due to patient body habitus and size as well as breast artifact, as per nuclear medicine report, LV size normal with function 71% with normal wall motion, overall considered an intermediate risk study, as per report.     Discussed with patient receiving the fax from [facility] with Lexiscan stress test dated 8/2021 and a rhythm strip, we did not get a copy of the cath report or recent EKG, patient acknowledges    EKG as above    1 hour hs Aurelio <6    Discussed patient overall work-up, this time feel low probability of active cardiac ischemia or other condition, feel we can safely discharge patient home, close outpatient follow-up with established cardiologist, advised her to follow-up with her Coumadin clinic given her low INR number, return to nearest ER if any symptoms change or worsen other concerns, acknowledged    1)  Number and Complexity of Problems  Problem List This Visit: Chest pain    Differential Diagnosis: ACS, AA, PE, GERD/gastritis/achalasia, PTX, pericarditis, pleurisy, myocarditis, anxiety, msk    Diagnoses Considered but Do Not Suspect: N/A    Pertinent Comorbid Conditions: As above    2)  Data Reviewed  My EKG interpretation:  As above    Decision Rules/Scores utilized:  HEART Score    Tests considered but not ordered and why: As above    External Documents Reviewed: [facility] from [facility], as above    Imaging that is independently reviewed and interpreted by me as: As above    See more data below for the lab and radiology tests and orders. 3)  Treatment and Disposition    Patient repeat assessment:  As above    Disposition discussion with patient/family: Discharge with outpatient follow-up    Case discussed with consulting clinician:  As above    Social determinants of health impacting treatment or disposition: N/A    Shared Decision Making: As above    Code Status Discussion: N/A      REASSESSMENT     As above      CRITICAL CARE TIME     Total Critical Care time was 0 minutes, excluding separately reportable procedures. There was a high probability of clinically significant/life threatening deterioration in the patient's condition which required my urgent intervention. PROCEDURES:  Unless otherwise noted below, none     Procedures    FINAL IMPRESSION      1. Chest pain, unspecified type    2.  Subtherapeutic international normalized ratio (INR)          DISPOSITION/PLAN     DISPOSITION Decision To Discharge 03/17/2023 05:49:16 PM      PATIENT REFERRED TO:  Follow up with your estabished Cardiology in [place], [place]    Call       John Fontanez & [address]  [phone]    Call       [facility]  [address] [facility] [address] [phone]    As needed, If symptoms worsen    DISCHARGE MEDICATIONS:  Discharge Medication List as of 3/17/2023  5:50 PM          (Please note that portions of this note were completed with a voice recognition program.  Efforts were made to edit the dictations but occasionally words are mis-transcribed.)    Penny Ruiz MD (electronically signed)  Attending Emergency Physician          Penny Ruiz MD  03/18/23 1817

## 2023-03-18 LAB
EKG ATRIAL RATE: 58 BPM
EKG ATRIAL RATE: 69 BPM
EKG P AXIS: 44 DEGREES
EKG P AXIS: 53 DEGREES
EKG P-R INTERVAL: 176 MS
EKG P-R INTERVAL: 178 MS
EKG Q-T INTERVAL: 390 MS
EKG Q-T INTERVAL: 428 MS
EKG QRS DURATION: 78 MS
EKG QRS DURATION: 80 MS
EKG QTC CALCULATION (BAZETT): 417 MS
EKG QTC CALCULATION (BAZETT): 420 MS
EKG R AXIS: -21 DEGREES
EKG R AXIS: -30 DEGREES
EKG T AXIS: 54 DEGREES
EKG T AXIS: 55 DEGREES
EKG VENTRICULAR RATE: 58 BPM
EKG VENTRICULAR RATE: 69 BPM

## 2023-03-18 PROCEDURE — 93010 ELECTROCARDIOGRAM REPORT: CPT | Performed by: INTERNAL MEDICINE

## 2023-06-17 ENCOUNTER — HOSPITAL ENCOUNTER (EMERGENCY)
Age: 53
Discharge: HOME | End: 2023-06-17
Attending: EMERGENCY MEDICINE
Payer: COMMERCIAL

## 2023-06-17 VITALS
HEART RATE: 73 BPM | TEMPERATURE: 97.5 F | DIASTOLIC BLOOD PRESSURE: 69 MMHG | WEIGHT: 223 LBS | OXYGEN SATURATION: 96 % | HEIGHT: 65 IN | SYSTOLIC BLOOD PRESSURE: 122 MMHG | BODY MASS INDEX: 37.15 KG/M2 | RESPIRATION RATE: 18 BRPM

## 2023-06-17 DIAGNOSIS — M65.9 TENOSYNOVITIS OF LEFT WRIST: Primary | ICD-10-CM

## 2023-06-17 PROCEDURE — 99282 EMERGENCY DEPT VISIT SF MDM: CPT

## 2023-06-17 ASSESSMENT — LIFESTYLE VARIABLES
HOW MANY STANDARD DRINKS CONTAINING ALCOHOL DO YOU HAVE ON A TYPICAL DAY: PATIENT DOES NOT DRINK
HOW OFTEN DO YOU HAVE A DRINK CONTAINING ALCOHOL: NEVER

## 2023-06-17 ASSESSMENT — PAIN DESCRIPTION - ORIENTATION: ORIENTATION: LEFT

## 2023-06-17 ASSESSMENT — PAIN DESCRIPTION - LOCATION: LOCATION: HAND

## 2023-06-17 ASSESSMENT — PAIN - FUNCTIONAL ASSESSMENT: PAIN_FUNCTIONAL_ASSESSMENT: 0-10

## 2023-06-17 ASSESSMENT — PAIN DESCRIPTION - DESCRIPTORS: DESCRIPTORS: SHARP

## 2023-06-17 ASSESSMENT — PAIN SCALES - GENERAL: PAINLEVEL_OUTOF10: 9

## 2023-06-17 ASSESSMENT — PAIN DESCRIPTION - PAIN TYPE: TYPE: ACUTE PAIN

## 2023-06-17 ASSESSMENT — PAIN DESCRIPTION - FREQUENCY: FREQUENCY: INTERMITTENT

## 2023-06-17 NOTE — DISCHARGE INSTRUCTIONS
Wear splint for comfort. Avoid excessive use of the left wrist.  Use Tylenol or Motrin for pain.   Follow-up with primary care

## 2023-06-17 NOTE — ED PROVIDER NOTES
the time of this note:    No orders to display         LABS:  Labs Reviewed - No data to display    All other labs were within normal range or not returned as of this dictation. MIPS    Not applicable      EMERGENCY DEPARTMENT COURSE and DIFFERENTIAL DIAGNOSIS/MDM:   Left thumb and wrist pain discomfort. Has some slight swelling. Nothing else to suggest DVT or vascular compromise. No wound to suggest infection. Treat for tenosynovitis. 1)  Number and Complexity of Problems  Problem List This Visit: Left wrist pain  Problem List Items Addressed This Visit    None  Visit Diagnoses       Tenosynovitis of left wrist    -  Primary            Differential Diagnosis: Sprain, tenosynovitis, infectious tenosynovitis, fracture      2)  Data Reviewed    Imaging that is independently reviewed with the associated radiologist report, not interpreted by me are:  Not applicable    Imaging that is independently reviewed and interpreted by me as:  Not applicable          See more data below for the lab and radiology tests and orders. 3)  Treatment and Disposition    Shared Decision Making:  Not applicable      FINAL  REASSESSMENT     3:55 PM     Patient be discharged home placed in a thumb spica Velcro splint for comfort. She does not feel she can work tomorrow. PROCEDURES:     none    Procedures     FINAL IMPRESSION      1.  Tenosynovitis of left wrist          DISPOSITION/PLAN     DISPOSITION Decision To Discharge 06/17/2023 03:53:25 PM      PATIENT REFERRED TO:  Magdalena Frazier  [facility] [address]  [phone]    Call   Follow up from ER condition      DISCHARGE MEDICATIONS:  New Prescriptions    No medications on file       (Please note that portions of this note were completed with a voice recognition program.  Efforts were made to edit the dictations but occasionally words are mis-transcribed.)    Dinah Mojica MD (electronically signed)  Attending Emergency

## 2023-10-27 ENCOUNTER — ANCILLARY PROCEDURE (OUTPATIENT)
Dept: RADIOLOGY | Facility: CLINIC | Age: 53
End: 2023-10-27
Payer: COMMERCIAL

## 2023-10-27 ENCOUNTER — OFFICE VISIT (OUTPATIENT)
Dept: ORTHOPEDIC SURGERY | Facility: CLINIC | Age: 53
End: 2023-10-27
Payer: COMMERCIAL

## 2023-10-27 DIAGNOSIS — M25.512 LEFT SHOULDER PAIN, UNSPECIFIED CHRONICITY: ICD-10-CM

## 2023-10-27 DIAGNOSIS — M24.812 INTERNAL DERANGEMENT OF LEFT SHOULDER: Primary | ICD-10-CM

## 2023-10-27 DIAGNOSIS — Z87.39 HISTORY OF ROTATOR CUFF TEAR: ICD-10-CM

## 2023-10-27 PROCEDURE — 73030 X-RAY EXAM OF SHOULDER: CPT | Mod: LEFT SIDE | Performed by: ORTHOPAEDIC SURGERY

## 2023-10-27 PROCEDURE — 99203 OFFICE O/P NEW LOW 30 MIN: CPT | Performed by: ORTHOPAEDIC SURGERY

## 2023-10-27 PROCEDURE — 73030 X-RAY EXAM OF SHOULDER: CPT | Mod: LT,FY

## 2023-10-27 PROCEDURE — 99213 OFFICE O/P EST LOW 20 MIN: CPT | Performed by: ORTHOPAEDIC SURGERY

## 2023-10-27 NOTE — LETTER
October 27, 2023     Patient: Allison Foley   YOB: 1970   Date of Visit: 10/27/2023       To Whom It May Concern:    It is my medical opinion that Allison Foley  is not able to work until further notice .    If you have any questions or concerns, please don't hesitate to call.         Sincerely,        Gustavo Villegas MD    CC: No Recipients

## 2023-10-27 NOTE — PROGRESS NOTES
History of Present Illness   Chief Complaint   Patient presents with    Left Shoulder - Pain       The patient is here with a complaint of side: left shoulder pain.  The patient is side: left hand dominant.  They have had 1 week of shoulder pain.  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 .  She notes difficulty with overhead activities.  She states she has had an MRI in the past which is roughly a year and a half ago which showed a rotator cuff tear.  She is not on any blood thinning medications however she is diabetic.    History reviewed. No pertinent past medical history.    Medication Documentation Review Audit       Reviewed by Leigh Mcdonald MA (Medical Assistant) on 10/27/23 at 0930      Medication Order Taking? Sig Documenting Provider Last Dose Status            No Medications to Display                                   Not on File    Social History     Socioeconomic History    Marital status:      Spouse name: Not on file    Number of children: Not on file    Years of education: Not on file    Highest education level: Not on file   Occupational History    Not on file   Tobacco Use    Smoking status: Not on file    Smokeless tobacco: Not on file   Substance and Sexual Activity    Alcohol use: Not on file    Drug use: Not on file    Sexual activity: Not on file   Other Topics Concern    Not on file   Social History Narrative    Not on file     Social Determinants of Health     Financial Resource Strain: Not on file   Food Insecurity: Not on file   Transportation Needs: Not on file   Physical Activity: Not on file   Stress: Not on file   Social Connections: Not on file   Intimate Partner Violence: Not on file   Housing Stability: Not on file       History reviewed. No pertinent surgical history.      Location:  Posteriorly along the superior scapula  Pain level: 5  Description: aching and sore  Pain with: reaching, lifting, pulling, pushing, carrying, and work at or above shoulder  height  Night pain: pain at night.     Review of Systems   GENERAL: Negative  GI: Negative  MUSCULOSKELETAL: See HPI  SKIN: Negative  NEURO:  Negative     Physical Exam:    This is a 53-year-old female in no acute distress  Neck is supple nontender, Spurling's test is negative  side: left Shoulder:  There is mild tenderness to palpation over the acromioclavicular joint.  Active range of motion: Forward elevation 120, internal rotation L1 external rotation 80 abduction 90  Jobes test positive for pain negative for weakness  External rotation test positive for pain negative for weakness  Belly press test negative  [place]'s test negative  Lift off test negative  [place]'s test positive  Elbow and wrist motion were not irritable.  Radial pulse 2+ and palpable.     Imaging  XR shoulder left 2+ views  Interpreted By:  Gustavo Villegas,   STUDY:  XR SHOULDER LEFT 2+ VIEWS;  ;  10/27/2023 10:51 am      INDICATION:  Signs/Symptoms:pain.      ACCESSION NUMBER(S):  [id number]      ORDERING CLINICIAN:  GUSTAVO VILLEGAS      FINDINGS:  Left shoulder films are negative for fracture, dislocation or  destructive lesion. There is moderate degenerative change of the  acromioclavicular joint. There is a decrease in the acromial humeral  distance with acetabularization of the acromion consistent with  rotator cuff insufficiency. No soft tissue abnormality is identified.          Signed by: Gustavo Villegas 10/27/2023 11:20 AM  Dictation workstation:   [id number]         Assessment   side: left Shoulder pain  History of left rotator cuff tear    Plan  Given the fact that her last MRI is roughly 18 months old we wish to repeat this before making any surgical decisions.  Her tear may have propagated or may be irreparable.  Left shoulder MRI.  She will follow-up after the MRI to go over the results and formulate treatment plan.  All questions answered.

## 2023-11-10 ENCOUNTER — HOSPITAL ENCOUNTER (OUTPATIENT)
Dept: RADIOLOGY | Facility: HOSPITAL | Age: 53
Discharge: HOME | End: 2023-11-10
Payer: COMMERCIAL

## 2023-11-10 DIAGNOSIS — M24.812 INTERNAL DERANGEMENT OF LEFT SHOULDER: ICD-10-CM

## 2023-11-10 PROCEDURE — 73221 MRI JOINT UPR EXTREM W/O DYE: CPT | Mod: LT

## 2023-11-10 PROCEDURE — 73221 MRI JOINT UPR EXTREM W/O DYE: CPT | Mod: LEFT SIDE | Performed by: STUDENT IN AN ORGANIZED HEALTH CARE EDUCATION/TRAINING PROGRAM

## 2023-11-16 ENCOUNTER — OFFICE VISIT (OUTPATIENT)
Dept: ORTHOPEDIC SURGERY | Facility: CLINIC | Age: 53
End: 2023-11-16
Payer: COMMERCIAL

## 2023-11-16 DIAGNOSIS — M75.122 COMPLETE TEAR OF LEFT ROTATOR CUFF, UNSPECIFIED WHETHER TRAUMATIC: Primary | ICD-10-CM

## 2023-11-16 PROCEDURE — 99214 OFFICE O/P EST MOD 30 MIN: CPT | Mod: 57 | Performed by: ORTHOPAEDIC SURGERY

## 2023-11-16 PROCEDURE — 99214 OFFICE O/P EST MOD 30 MIN: CPT | Performed by: ORTHOPAEDIC SURGERY

## 2023-11-16 NOTE — PROGRESS NOTES
History of Present Illness   No chief complaint on file.      Patient returns today with side: left shoulder pain.  The patient is here to review MRI based on failure to improve with non-surgical modalities.    The pain is sharp in nature, localizes lateral and deep.  Better with rest.    No past medical history on file.    Medication Documentation Review Audit       Reviewed by Leigh Mcdonald MA (Medical Assistant) on 10/27/23 at 0930      Medication Order Taking? Sig Documenting Provider Last Dose Status            No Medications to Display                                   Not on File    Social History     Socioeconomic History    Marital status:      Spouse name: Not on file    Number of children: Not on file    Years of education: Not on file    Highest education level: Not on file   Occupational History    Not on file   Tobacco Use    Smoking status: Not on file    Smokeless tobacco: Not on file   Substance and Sexual Activity    Alcohol use: Not on file    Drug use: Not on file    Sexual activity: Not on file   Other Topics Concern    Not on file   Social History Narrative    Not on file     Social Determinants of Health     Financial Resource Strain: Not on file   Food Insecurity: Not on file   Transportation Needs: Not on file   Physical Activity: Not on file   Stress: Not on file   Social Connections: Not on file   Intimate Partner Violence: Not on file   Housing Stability: Not on file       No past surgical history on file.    BMI  38     Review of Systems   GENERAL: Negative for malaise, significant weight loss, fever  MUSCULOSKELETAL: see HPI  NEURO:  Negative     Physical Exam  This is a 53 year old female in no acute distress  Neck is supple nontender, Spurling's test is negative  side: left Shoulder:  There is  no  tenderness to palpation over the acromioclavicular joint.  Active range of motion: Forward elevation 160, internal rotation to L1, external rotation 80  Jobes test  positive  External rotation test positive for pain, negative for weakness  Belly press test negative  [place]'s test negative  Ring maneuver positive  Elbow and wrist motion were not irritable.  Radial pulse 2+ and palpable.     Imaging:  MR shoulder left wo IV contrast  Narrative: Interpreted By:  Alex Montague,   STUDY:  MRI of the  left shoulder without IV contrast;  11/10/2023 10:57 am      INDICATION:  Signs/Symptoms:History of rotator cuff tear, progressive symptoms.      COMPARISON:  10/27/2023      ACCESSION NUMBER(S):  [id number]      ORDERING CLINICIAN:  ALEXANDRO REVELES      TECHNIQUE:  MR imaging of the  left shoulder was obtained  without IV contrast.      FINDINGS:  ROTATOR CUFF TENDONS:  There is a full-thickness tear of the anterior insertional  supraspinatus tendon, measuring 14 mm in AP dimension. High-grade,  partial-thickness articular surface tearing extends posteriorly into  the anterior infraspinatus tendon by approximately 5 mm. These  findings are superimposed on mild tendinosis. No tendon retraction.  The teres minor tendon is intact. The subscapularis tendon  demonstrates mild tendinosis with partial-thickness bursal sided  tearing superiorly. Mild supraspinatus volume loss.      BICEPS TENDON AND ROTATOR INTERVAL:  The intra-articular long head biceps tendon is intact and has a  normal course. The rotator interval is unremarkable.      JOINTS:  Moderate acromioclavicular degenerative change with downward  projecting osteophytes and mass effect on the subjacent supraspinatus  myotendinous junction.      Evaluation of the glenohumeral articulation demonstrates no articular  cartilage defects.      No evidence of significant joint effusion. Small volume subacromial  subdeltoid bursal fluid.      LABRUM:  There is superior, anterior superior, and posterosuperior  degenerative labral truncation.      OSSEOUS STRUCTURES:  No focal marrow replacing lesions are identified. There is  no  fracture.      SOFT TISSUES:  The suprascapular nerve is intact at the suprascapular and  spinoglenoid notches.      Impression: Full-thickness tear of the anterior insertional supraspinatus tendon,  measuring 14 mm in AP dimension. There is high-grade,  partial-thickness articular surface tearing extending into the  anterior infraspinatus tendon by 5 mm. Mild tendinosis. Mild  supraspinatus volume loss.      Moderate acromioclavicular osteoarthrosis with findings which may  reflect external impingement the appropriate clinical scenario.      MACRO:  None      Signed by: Alex Montague 11/10/2023 11:03 AM  Dictation workstation:   [id number]         Assessment:  Patient with side: left shoulder  rotator cuff tear      Plan:  MRI was reviewed.   Patient would like to proceed with surgical intervention.  We had a lengthy discussion regarding rotator cuff tears.  We discussed non-operative treatment and concern for atrophy, weakness and possible progression to cuff arthropathy.    We discussed surgical intervention and rotator cuff repair, including associated interventions at the time of surgery.       The risk of failure to heal/re-rupture was reviewed and the role of the size of the tear and timing of intervention in regards to the outcome.     Shoulder arthroscopy was discussed including the risks (stiffness, infection, medical complication, etc.) and timeframe for recovery.       We discussed the importance of formal physical therapy and strict adherence to a home exercise program.    If the patient would like to proceed, we will obtain appropriate pre-operative testing.       Not on File    REVIEW OF SYSTEMS  General: Negative for malaise, significant weight loss, fever  Musculoskeletal: See HPI  Neuro:  Negative for numbness/tingling      Medication Documentation Review Audit       Reviewed by Leigh Mcdonald MA (Medical Assistant) on 10/27/23 at 0930      Medication Order Taking? Sig Documenting Provider  Last Dose Status            No Medications to Display                                   PHYSICAL EXAM  General Appearance/Mental Status: A&Ox3, no apparent distress  HEENT: Normal  Lungs: Clear  Heart: RRR  Abdomen: Soft, nontender  Extremities: Abnormal left shoulder rotator cuff tear

## 2023-11-24 PROBLEM — M75.42 IMPINGEMENT SYNDROME OF LEFT SHOULDER: Status: ACTIVE | Noted: 2023-11-24

## 2023-11-24 PROBLEM — M75.122 COMPLETE ROTATOR CUFF TEAR OR RUPTURE OF LEFT SHOULDER, NOT SPECIFIED AS TRAUMATIC: Status: ACTIVE | Noted: 2023-11-24

## 2023-12-01 ENCOUNTER — LAB (OUTPATIENT)
Dept: LAB | Facility: HOSPITAL | Age: 53
End: 2023-12-01
Payer: COMMERCIAL

## 2023-12-01 ENCOUNTER — HOSPITAL ENCOUNTER (OUTPATIENT)
Dept: CARDIOLOGY | Facility: HOSPITAL | Age: 53
Discharge: HOME | End: 2023-12-01
Payer: COMMERCIAL

## 2023-12-01 DIAGNOSIS — M75.122 COMPLETE ROTATOR CUFF TEAR OR RUPTURE OF LEFT SHOULDER, NOT SPECIFIED AS TRAUMATIC: ICD-10-CM

## 2023-12-01 DIAGNOSIS — M75.42 IMPINGEMENT SYNDROME OF LEFT SHOULDER: ICD-10-CM

## 2023-12-01 LAB
ALBUMIN SERPL BCP-MCNC: 4.7 G/DL (ref 3.4–5)
ALP SERPL-CCNC: 103 U/L (ref 33–110)
ALT SERPL W P-5'-P-CCNC: 22 U/L (ref 7–45)
ANION GAP SERPL CALC-SCNC: 13 MMOL/L (ref 10–20)
AST SERPL W P-5'-P-CCNC: 26 U/L (ref 9–39)
ATRIAL RATE: 63 BPM
BASOPHILS # BLD AUTO: 0.09 X10*3/UL (ref 0–0.1)
BASOPHILS NFR BLD AUTO: 1.1 %
BILIRUB SERPL-MCNC: 1.3 MG/DL (ref 0–1.2)
BUN SERPL-MCNC: 17 MG/DL (ref 6–23)
CALCIUM SERPL-MCNC: 10.1 MG/DL (ref 8.6–10.3)
CHLORIDE SERPL-SCNC: 98 MMOL/L (ref 98–107)
CO2 SERPL-SCNC: 31 MMOL/L (ref 21–32)
CREAT SERPL-MCNC: 1.04 MG/DL (ref 0.5–1.05)
EOSINOPHIL # BLD AUTO: 0.1 X10*3/UL (ref 0–0.7)
EOSINOPHIL NFR BLD AUTO: 1.2 %
ERYTHROCYTE [DISTWIDTH] IN BLOOD BY AUTOMATED COUNT: 13.2 % (ref 11.5–14.5)
GFR SERPL CREATININE-BSD FRML MDRD: 64 ML/MIN/1.73M*2
GLUCOSE SERPL-MCNC: 80 MG/DL (ref 74–99)
HCT VFR BLD AUTO: 43.1 % (ref 36–46)
HGB BLD-MCNC: 14.7 G/DL (ref 12–16)
IMM GRANULOCYTES # BLD AUTO: 0.02 X10*3/UL (ref 0–0.7)
IMM GRANULOCYTES NFR BLD AUTO: 0.2 % (ref 0–0.9)
INR PPP: 1 (ref 0.9–1.1)
LYMPHOCYTES # BLD AUTO: 2.95 X10*3/UL (ref 1.2–4.8)
LYMPHOCYTES NFR BLD AUTO: 34.8 %
MCH RBC QN AUTO: 30.9 PG (ref 26–34)
MCHC RBC AUTO-ENTMCNC: 34.1 G/DL (ref 32–36)
MCV RBC AUTO: 91 FL (ref 80–100)
MONOCYTES # BLD AUTO: 0.47 X10*3/UL (ref 0.1–1)
MONOCYTES NFR BLD AUTO: 5.5 %
NEUTROPHILS # BLD AUTO: 4.84 X10*3/UL (ref 1.2–7.7)
NEUTROPHILS NFR BLD AUTO: 57.2 %
NRBC BLD-RTO: 0 /100 WBCS (ref 0–0)
P AXIS: 23 DEGREES
P OFFSET: 182 MS
P ONSET: 110 MS
PLATELET # BLD AUTO: 273 X10*3/UL (ref 150–450)
POTASSIUM SERPL-SCNC: 3.9 MMOL/L (ref 3.5–5.3)
PR INTERVAL: 208 MS
PROT SERPL-MCNC: 7.5 G/DL (ref 6.4–8.2)
PROTHROMBIN TIME: 11.4 SECONDS (ref 9.8–12.8)
Q ONSET: 214 MS
QRS COUNT: 11 BEATS
QRS DURATION: 88 MS
QT INTERVAL: 418 MS
QTC CALCULATION(BAZETT): 427 MS
QTC FREDERICIA: 424 MS
R AXIS: -35 DEGREES
RBC # BLD AUTO: 4.76 X10*6/UL (ref 4–5.2)
SODIUM SERPL-SCNC: 138 MMOL/L (ref 136–145)
T AXIS: 51 DEGREES
T OFFSET: 423 MS
VENTRICULAR RATE: 63 BPM
WBC # BLD AUTO: 8.5 X10*3/UL (ref 4.4–11.3)

## 2023-12-01 PROCEDURE — 85025 COMPLETE CBC W/AUTO DIFF WBC: CPT

## 2023-12-01 PROCEDURE — 83036 HEMOGLOBIN GLYCOSYLATED A1C: CPT | Mod: ELYLAB

## 2023-12-01 PROCEDURE — 93010 ELECTROCARDIOGRAM REPORT: CPT | Performed by: INTERNAL MEDICINE

## 2023-12-01 PROCEDURE — 85610 PROTHROMBIN TIME: CPT

## 2023-12-01 PROCEDURE — 36415 COLL VENOUS BLD VENIPUNCTURE: CPT

## 2023-12-01 PROCEDURE — 93005 ELECTROCARDIOGRAM TRACING: CPT

## 2023-12-01 PROCEDURE — 80053 COMPREHEN METABOLIC PANEL: CPT

## 2023-12-02 LAB
EST. AVERAGE GLUCOSE BLD GHB EST-MCNC: 137 MG/DL
HBA1C MFR BLD: 6.4 %

## 2023-12-12 ENCOUNTER — TELEPHONE (OUTPATIENT)
Dept: ORTHOPEDIC SURGERY | Facility: CLINIC | Age: 53
End: 2023-12-12
Payer: COMMERCIAL

## 2023-12-14 ENCOUNTER — APPOINTMENT (OUTPATIENT)
Dept: ORTHOPEDIC SURGERY | Facility: CLINIC | Age: 53
End: 2023-12-14
Payer: COMMERCIAL

## 2023-12-20 RX ORDER — CHOLECALCIFEROL (VITAMIN D3) 125 MCG
125 CAPSULE ORAL DAILY
COMMUNITY
Start: 2023-10-19

## 2023-12-20 RX ORDER — ALBUTEROL SULFATE 90 UG/1
2 AEROSOL, METERED RESPIRATORY (INHALATION) EVERY 4 HOURS PRN
COMMUNITY
Start: 2021-12-27

## 2023-12-20 RX ORDER — PANTOPRAZOLE SODIUM 40 MG/1
40 TABLET, DELAYED RELEASE ORAL
COMMUNITY
Start: 2023-04-24

## 2023-12-20 RX ORDER — METOPROLOL TARTRATE 25 MG/1
25 TABLET, FILM COATED ORAL 2 TIMES DAILY
COMMUNITY
Start: 2020-06-29

## 2023-12-20 RX ORDER — TIZANIDINE 4 MG/1
TABLET ORAL
COMMUNITY

## 2023-12-20 RX ORDER — BUMETANIDE 1 MG/1
1 TABLET ORAL
COMMUNITY
Start: 2020-06-29

## 2023-12-20 RX ORDER — NORTRIPTYLINE HYDROCHLORIDE 75 MG/1
75 CAPSULE ORAL
COMMUNITY
Start: 2023-10-20

## 2023-12-20 RX ORDER — VARENICLINE TARTRATE 1 MG/1
TABLET, FILM COATED ORAL
Status: ON HOLD | COMMUNITY
Start: 2023-09-24 | End: 2023-12-27 | Stop reason: ALTCHOICE

## 2023-12-20 RX ORDER — LEVOTHYROXINE SODIUM 125 UG/1
125 TABLET ORAL DAILY
COMMUNITY

## 2023-12-20 RX ORDER — CYPROHEPTADINE HYDROCHLORIDE 4 MG/1
2 TABLET ORAL NIGHTLY
COMMUNITY
Start: 2021-05-28

## 2023-12-20 RX ORDER — ASPIRIN 81 MG/1
81 TABLET ORAL
COMMUNITY
Start: 2021-04-08

## 2023-12-20 RX ORDER — GABAPENTIN 300 MG/1
300 CAPSULE ORAL 3 TIMES DAILY
COMMUNITY

## 2023-12-20 RX ORDER — ATORVASTATIN CALCIUM 80 MG/1
80 TABLET, FILM COATED ORAL
COMMUNITY
Start: 2022-06-24

## 2023-12-20 RX ORDER — FLUTICASONE FUROATE AND VILANTEROL 100; 25 UG/1; UG/1
1 POWDER RESPIRATORY (INHALATION) DAILY
Status: ON HOLD | COMMUNITY
Start: 2019-08-21 | End: 2023-12-27 | Stop reason: ALTCHOICE

## 2023-12-20 RX ORDER — BUDESONIDE AND FORMOTEROL FUMARATE DIHYDRATE 80; 4.5 UG/1; UG/1
2 AEROSOL RESPIRATORY (INHALATION) 2 TIMES DAILY
COMMUNITY
Start: 2020-08-04

## 2023-12-20 RX ORDER — LUMATEPERONE 42 MG/1
42 CAPSULE ORAL
COMMUNITY
Start: 2023-01-05

## 2023-12-20 RX ORDER — METFORMIN HYDROCHLORIDE 1000 MG/1
1000 TABLET ORAL 2 TIMES DAILY
COMMUNITY

## 2023-12-20 RX ORDER — POTASSIUM CHLORIDE 20 MEQ/1
20 TABLET, EXTENDED RELEASE ORAL
COMMUNITY
Start: 2022-06-24

## 2023-12-20 RX ORDER — ALBUTEROL SULFATE 0.83 MG/ML
2.5 SOLUTION RESPIRATORY (INHALATION) EVERY 6 HOURS PRN
COMMUNITY
Start: 2022-10-24

## 2023-12-20 NOTE — PREPROCEDURE INSTRUCTIONS
PT STATES SHE HAS SLING FROM DR MARTINEZ'S OFFICE; INSTRUCTED TO BRING IN DAY OF SURGERY

## 2023-12-21 RX ORDER — SODIUM CHLORIDE AND POTASSIUM CHLORIDE 150; 900 MG/100ML; MG/100ML
100 INJECTION, SOLUTION INTRAVENOUS CONTINUOUS
Status: CANCELLED | OUTPATIENT
Start: 2023-12-21

## 2023-12-26 ENCOUNTER — ANESTHESIA EVENT (OUTPATIENT)
Dept: OPERATING ROOM | Facility: HOSPITAL | Age: 53
End: 2023-12-26
Payer: COMMERCIAL

## 2023-12-26 PROBLEM — I10 HTN (HYPERTENSION): Status: ACTIVE | Noted: 2023-12-26

## 2023-12-26 PROBLEM — E03.9 HYPOTHYROIDISM: Status: ACTIVE | Noted: 2023-12-26

## 2023-12-26 PROBLEM — E78.5 HYPERLIPIDEMIA: Status: ACTIVE | Noted: 2023-12-26

## 2023-12-26 PROBLEM — E11.9 DIABETES MELLITUS, TYPE 2 (MULTI): Status: ACTIVE | Noted: 2023-12-26

## 2023-12-26 NOTE — ANESTHESIA PREPROCEDURE EVALUATION
Patient: Allison Foley    Procedure Information       Date/Time: 12/27/23 1642    Procedure: Repair Arthroscopy Rotator Cuff Shoulder LEFT DIABETIC, ALLERGY ADHESIVE TAPE AND LATEX, LATERAL DECUB (Left: Shoulder)    Location: [facility] / [facility]    Surgeons: Gustavo Villegas MD            Relevant Problems   Cardiovascular   (+) HTN (hypertension)   (+) Hyperlipidemia      Endocrine   (+) Diabetes mellitus, type 2 (CMS/HCC)   (+) Hypothyroidism      Other   (+) Impingement syndrome of left shoulder       Clinical information reviewed:   Tobacco  Allergies  Meds   Med Hx  Surg Hx   Fam Hx  Soc Hx        NPO Detail:  No data recorded     Physical Exam    Airway  Mallampati: II     Cardiovascular   Rhythm: regular  Rate: normal     Dental    Pulmonary - normal exam     Abdominal - normal exam             Anesthesia Plan    ASA 3     general and regional     intravenous induction   Postoperative administration of opioids is intended.  Anesthetic plan and risks discussed with patient.

## 2023-12-27 ENCOUNTER — ANESTHESIA (OUTPATIENT)
Dept: OPERATING ROOM | Facility: HOSPITAL | Age: 53
End: 2023-12-27
Payer: COMMERCIAL

## 2023-12-27 ENCOUNTER — HOSPITAL ENCOUNTER (OUTPATIENT)
Facility: HOSPITAL | Age: 53
Setting detail: OUTPATIENT SURGERY
Discharge: HOME | End: 2023-12-27
Attending: ORTHOPAEDIC SURGERY | Admitting: ORTHOPAEDIC SURGERY
Payer: COMMERCIAL

## 2023-12-27 VITALS
HEART RATE: 73 BPM | RESPIRATION RATE: 20 BRPM | DIASTOLIC BLOOD PRESSURE: 64 MMHG | HEIGHT: 64 IN | SYSTOLIC BLOOD PRESSURE: 117 MMHG | TEMPERATURE: 96.1 F | OXYGEN SATURATION: 94 % | BODY MASS INDEX: 40.16 KG/M2 | WEIGHT: 235.23 LBS

## 2023-12-27 DIAGNOSIS — M75.122 COMPLETE ROTATOR CUFF TEAR OR RUPTURE OF LEFT SHOULDER, NOT SPECIFIED AS TRAUMATIC: ICD-10-CM

## 2023-12-27 DIAGNOSIS — Z98.890 S/P LEFT ROTATOR CUFF REPAIR: Primary | ICD-10-CM

## 2023-12-27 DIAGNOSIS — S46.012S TRAUMATIC COMPLETE TEAR OF LEFT ROTATOR CUFF, SEQUELA: ICD-10-CM

## 2023-12-27 DIAGNOSIS — M75.42 IMPINGEMENT SYNDROME OF LEFT SHOULDER: ICD-10-CM

## 2023-12-27 LAB
GLUCOSE BLD MANUAL STRIP-MCNC: 129 MG/DL (ref 74–99)
GLUCOSE BLD MANUAL STRIP-MCNC: 136 MG/DL (ref 74–99)

## 2023-12-27 PROCEDURE — 2500000004 HC RX 250 GENERAL PHARMACY W/ HCPCS (ALT 636 FOR OP/ED): Performed by: NURSE ANESTHETIST, CERTIFIED REGISTERED

## 2023-12-27 PROCEDURE — 2500000001 HC RX 250 WO HCPCS SELF ADMINISTERED DRUGS (ALT 637 FOR MEDICARE OP): Performed by: STUDENT IN AN ORGANIZED HEALTH CARE EDUCATION/TRAINING PROGRAM

## 2023-12-27 PROCEDURE — 29827 SHO ARTHRS SRG RT8TR CUF RPR: CPT | Performed by: ORTHOPAEDIC SURGERY

## 2023-12-27 PROCEDURE — 2500000004 HC RX 250 GENERAL PHARMACY W/ HCPCS (ALT 636 FOR OP/ED): Performed by: ORTHOPAEDIC SURGERY

## 2023-12-27 PROCEDURE — 2720000007 HC OR 272 NO HCPCS: Performed by: ORTHOPAEDIC SURGERY

## 2023-12-27 PROCEDURE — 7100000002 HC RECOVERY ROOM TIME - EACH INCREMENTAL 1 MINUTE: Performed by: ORTHOPAEDIC SURGERY

## 2023-12-27 PROCEDURE — 2500000004 HC RX 250 GENERAL PHARMACY W/ HCPCS (ALT 636 FOR OP/ED): Performed by: STUDENT IN AN ORGANIZED HEALTH CARE EDUCATION/TRAINING PROGRAM

## 2023-12-27 PROCEDURE — 29826 SHO ARTHRS SRG DECOMPRESSION: CPT | Performed by: ORTHOPAEDIC SURGERY

## 2023-12-27 PROCEDURE — 2500000004 HC RX 250 GENERAL PHARMACY W/ HCPCS (ALT 636 FOR OP/ED): Performed by: PHYSICIAN ASSISTANT

## 2023-12-27 PROCEDURE — 7100000009 HC PHASE TWO TIME - INITIAL BASE CHARGE: Performed by: ORTHOPAEDIC SURGERY

## 2023-12-27 PROCEDURE — 29827 SHO ARTHRS SRG RT8TR CUF RPR: CPT | Performed by: PHYSICIAN ASSISTANT

## 2023-12-27 PROCEDURE — 3700000001 HC GENERAL ANESTHESIA TIME - INITIAL BASE CHARGE: Performed by: ORTHOPAEDIC SURGERY

## 2023-12-27 PROCEDURE — 2500000005 HC RX 250 GENERAL PHARMACY W/O HCPCS: Performed by: NURSE ANESTHETIST, CERTIFIED REGISTERED

## 2023-12-27 PROCEDURE — 3700000002 HC GENERAL ANESTHESIA TIME - EACH INCREMENTAL 1 MINUTE: Performed by: ORTHOPAEDIC SURGERY

## 2023-12-27 PROCEDURE — A4217 STERILE WATER/SALINE, 500 ML: HCPCS | Performed by: ORTHOPAEDIC SURGERY

## 2023-12-27 PROCEDURE — C1713 ANCHOR/SCREW BN/BN,TIS/BN: HCPCS | Performed by: ORTHOPAEDIC SURGERY

## 2023-12-27 PROCEDURE — 82947 ASSAY GLUCOSE BLOOD QUANT: CPT

## 2023-12-27 PROCEDURE — 7100000001 HC RECOVERY ROOM TIME - INITIAL BASE CHARGE: Performed by: ORTHOPAEDIC SURGERY

## 2023-12-27 PROCEDURE — 2780000003 HC OR 278 NO HCPCS: Performed by: ORTHOPAEDIC SURGERY

## 2023-12-27 PROCEDURE — 3600000009 HC OR TIME - EACH INCREMENTAL 1 MINUTE - PROCEDURE LEVEL FOUR: Performed by: ORTHOPAEDIC SURGERY

## 2023-12-27 PROCEDURE — 7100000010 HC PHASE TWO TIME - EACH INCREMENTAL 1 MINUTE: Performed by: ORTHOPAEDIC SURGERY

## 2023-12-27 PROCEDURE — 29826 SHO ARTHRS SRG DECOMPRESSION: CPT | Performed by: PHYSICIAN ASSISTANT

## 2023-12-27 PROCEDURE — 3600000004 HC OR TIME - INITIAL BASE CHARGE - PROCEDURE LEVEL FOUR: Performed by: ORTHOPAEDIC SURGERY

## 2023-12-27 DEVICE — SUTURE, 2.6 FIBERTAK RC SP, 1.7 TIGERTAPE LOOP, WH/BLK: Type: IMPLANTABLE DEVICE | Site: SHOULDER | Status: FUNCTIONAL

## 2023-12-27 RX ORDER — LIDOCAINE HYDROCHLORIDE 10 MG/ML
0.1 INJECTION, SOLUTION EPIDURAL; INFILTRATION; INTRACAUDAL; PERINEURAL ONCE
Status: DISCONTINUED | OUTPATIENT
Start: 2023-12-27 | End: 2023-12-27 | Stop reason: HOSPADM

## 2023-12-27 RX ORDER — LABETALOL HYDROCHLORIDE 5 MG/ML
5 INJECTION, SOLUTION INTRAVENOUS ONCE AS NEEDED
Status: DISCONTINUED | OUTPATIENT
Start: 2023-12-27 | End: 2023-12-27 | Stop reason: HOSPADM

## 2023-12-27 RX ORDER — SODIUM CHLORIDE 0.9 G/100ML
IRRIGANT IRRIGATION AS NEEDED
Status: DISCONTINUED | OUTPATIENT
Start: 2023-12-27 | End: 2023-12-27 | Stop reason: HOSPADM

## 2023-12-27 RX ORDER — MEPERIDINE HYDROCHLORIDE 25 MG/ML
12.5 INJECTION INTRAMUSCULAR; INTRAVENOUS; SUBCUTANEOUS EVERY 10 MIN PRN
Status: DISCONTINUED | OUTPATIENT
Start: 2023-12-27 | End: 2023-12-27 | Stop reason: HOSPADM

## 2023-12-27 RX ORDER — SODIUM CHLORIDE, SODIUM LACTATE, POTASSIUM CHLORIDE, CALCIUM CHLORIDE 600; 310; 30; 20 MG/100ML; MG/100ML; MG/100ML; MG/100ML
100 INJECTION, SOLUTION INTRAVENOUS CONTINUOUS
Status: DISCONTINUED | OUTPATIENT
Start: 2023-12-27 | End: 2023-12-27 | Stop reason: HOSPADM

## 2023-12-27 RX ORDER — OXYCODONE HYDROCHLORIDE 5 MG/1
5 TABLET ORAL EVERY 4 HOURS PRN
Status: DISCONTINUED | OUTPATIENT
Start: 2023-12-27 | End: 2023-12-27 | Stop reason: HOSPADM

## 2023-12-27 RX ORDER — FENTANYL CITRATE 50 UG/ML
50 INJECTION, SOLUTION INTRAMUSCULAR; INTRAVENOUS EVERY 5 MIN PRN
Status: DISCONTINUED | OUTPATIENT
Start: 2023-12-27 | End: 2023-12-27 | Stop reason: HOSPADM

## 2023-12-27 RX ORDER — FENTANYL CITRATE 50 UG/ML
INJECTION, SOLUTION INTRAMUSCULAR; INTRAVENOUS AS NEEDED
Status: DISCONTINUED | OUTPATIENT
Start: 2023-12-27 | End: 2023-12-27

## 2023-12-27 RX ORDER — EPINEPHRINE 1 MG/ML
INJECTION, SOLUTION, CONCENTRATE INTRAVENOUS AS NEEDED
Status: DISCONTINUED | OUTPATIENT
Start: 2023-12-27 | End: 2023-12-27 | Stop reason: HOSPADM

## 2023-12-27 RX ORDER — DIPHENHYDRAMINE HYDROCHLORIDE 50 MG/ML
12.5 INJECTION INTRAMUSCULAR; INTRAVENOUS ONCE AS NEEDED
Status: DISCONTINUED | OUTPATIENT
Start: 2023-12-27 | End: 2023-12-27 | Stop reason: HOSPADM

## 2023-12-27 RX ORDER — SCOLOPAMINE TRANSDERMAL SYSTEM 1 MG/1
PATCH, EXTENDED RELEASE TRANSDERMAL AS NEEDED
Status: DISCONTINUED | OUTPATIENT
Start: 2023-12-27 | End: 2023-12-27

## 2023-12-27 RX ORDER — LIDOCAINE HYDROCHLORIDE 20 MG/ML
INJECTION, SOLUTION INFILTRATION; PERINEURAL AS NEEDED
Status: DISCONTINUED | OUTPATIENT
Start: 2023-12-27 | End: 2023-12-27

## 2023-12-27 RX ORDER — OXYCODONE HYDROCHLORIDE 5 MG/1
5 TABLET ORAL EVERY 6 HOURS PRN
Qty: 28 TABLET | Refills: 0 | Status: SHIPPED | OUTPATIENT
Start: 2023-12-27 | End: 2024-01-03

## 2023-12-27 RX ORDER — MIDAZOLAM HYDROCHLORIDE 1 MG/ML
INJECTION, SOLUTION INTRAMUSCULAR; INTRAVENOUS AS NEEDED
Status: DISCONTINUED | OUTPATIENT
Start: 2023-12-27 | End: 2023-12-27

## 2023-12-27 RX ORDER — CEFAZOLIN SODIUM 2 G/100ML
2 INJECTION, SOLUTION INTRAVENOUS ONCE
Status: COMPLETED | OUTPATIENT
Start: 2023-12-27 | End: 2023-12-27

## 2023-12-27 RX ORDER — DROPERIDOL 2.5 MG/ML
0.62 INJECTION, SOLUTION INTRAMUSCULAR; INTRAVENOUS ONCE AS NEEDED
Status: DISCONTINUED | OUTPATIENT
Start: 2023-12-27 | End: 2023-12-27 | Stop reason: HOSPADM

## 2023-12-27 RX ORDER — PROPOFOL 10 MG/ML
INJECTION, EMULSION INTRAVENOUS AS NEEDED
Status: DISCONTINUED | OUTPATIENT
Start: 2023-12-27 | End: 2023-12-27

## 2023-12-27 RX ORDER — ROCURONIUM BROMIDE 10 MG/ML
INJECTION, SOLUTION INTRAVENOUS AS NEEDED
Status: DISCONTINUED | OUTPATIENT
Start: 2023-12-27 | End: 2023-12-27

## 2023-12-27 RX ADMIN — FENTANYL CITRATE 50 MCG: 50 INJECTION, SOLUTION INTRAMUSCULAR; INTRAVENOUS at 08:09

## 2023-12-27 RX ADMIN — OXYCODONE HYDROCHLORIDE 5 MG: 5 TABLET ORAL at 10:17

## 2023-12-27 RX ADMIN — CEFAZOLIN SODIUM 2 G: 2 INJECTION, SOLUTION INTRAVENOUS at 07:37

## 2023-12-27 RX ADMIN — DEXAMETHASONE SODIUM PHOSPHATE: 10 INJECTION, SOLUTION INTRAMUSCULAR; INTRAVENOUS at 07:23

## 2023-12-27 RX ADMIN — ROCURONIUM BROMIDE 70 MG: 10 SOLUTION INTRAVENOUS at 07:35

## 2023-12-27 RX ADMIN — SODIUM CHLORIDE, POTASSIUM CHLORIDE, SODIUM LACTATE AND CALCIUM CHLORIDE 100 ML/HR: 600; 310; 30; 20 INJECTION, SOLUTION INTRAVENOUS at 06:19

## 2023-12-27 RX ADMIN — PROPOFOL 200 MG: 10 INJECTION, EMULSION INTRAVENOUS at 07:35

## 2023-12-27 RX ADMIN — LIDOCAINE HYDROCHLORIDE 60 MG: 20 INJECTION, SOLUTION INFILTRATION; PERINEURAL at 07:35

## 2023-12-27 RX ADMIN — MIDAZOLAM 2 MG: 1 INJECTION INTRAMUSCULAR; INTRAVENOUS at 07:20

## 2023-12-27 RX ADMIN — SCOPALAMINE 1 PATCH: 1 PATCH, EXTENDED RELEASE TRANSDERMAL at 07:29

## 2023-12-27 RX ADMIN — ROCURONIUM BROMIDE 10 MG: 10 SOLUTION INTRAVENOUS at 08:26

## 2023-12-27 RX ADMIN — SUGAMMADEX 200 MG: 100 INJECTION, SOLUTION INTRAVENOUS at 08:42

## 2023-12-27 RX ADMIN — FENTANYL CITRATE 50 MCG: 50 INJECTION, SOLUTION INTRAMUSCULAR; INTRAVENOUS at 07:35

## 2023-12-27 ASSESSMENT — PAIN SCALES - GENERAL
PAINLEVEL_OUTOF10: 5 - MODERATE PAIN
PAINLEVEL_OUTOF10: 0 - NO PAIN
PAIN_LEVEL: 1
PAINLEVEL_OUTOF10: 5 - MODERATE PAIN
PAINLEVEL_OUTOF10: 0 - NO PAIN
PAINLEVEL_OUTOF10: 0 - NO PAIN
PAINLEVEL_OUTOF10: 2
PAINLEVEL_OUTOF10: 0 - NO PAIN
PAINLEVEL_OUTOF10: 0 - NO PAIN
PAINLEVEL_OUTOF10: 5 - MODERATE PAIN
PAINLEVEL_OUTOF10: 0 - NO PAIN

## 2023-12-27 ASSESSMENT — PAIN - FUNCTIONAL ASSESSMENT
PAIN_FUNCTIONAL_ASSESSMENT: 0-10

## 2023-12-27 ASSESSMENT — COLUMBIA-SUICIDE SEVERITY RATING SCALE - C-SSRS
6. HAVE YOU EVER DONE ANYTHING, STARTED TO DO ANYTHING, OR PREPARED TO DO ANYTHING TO END YOUR LIFE?: NO
1. IN THE PAST MONTH, HAVE YOU WISHED YOU WERE DEAD OR WISHED YOU COULD GO TO SLEEP AND NOT WAKE UP?: NO

## 2023-12-27 ASSESSMENT — PAIN DESCRIPTION - DESCRIPTORS: DESCRIPTORS: SHARP;BURNING

## 2023-12-27 NOTE — PERIOPERATIVE NURSING NOTE
Pt. Had pre op block. No sensation of fingers. Unable to wiggle fingers. Nail beds pink and agood pulse.

## 2023-12-27 NOTE — ANESTHESIA PROCEDURE NOTES
Peripheral Block    Patient location during procedure: pre-op  Start time: 12/27/2023 7:20 AM  End time: 12/27/2023 7:28 AM  Reason for block: post-op pain management  Staffing  Performed: attending   Authorized by: Chucho Patel DO    Performed by: Chucho Patel DO  Preanesthetic Checklist  Completed: patient identified, IV checked, site marked, risks and benefits discussed, surgical consent, monitors and equipment checked, pre-op evaluation and timeout performed   Timeout performed at:   Peripheral Block  Patient position: laying flat  Prep: ChloraPrep  Patient monitoring: heart rate, continuous pulse ox and cardiac monitor  Block type: supraclavicular  Laterality: left  Injection technique: single-shot  Guidance: ultrasound guided  Local infiltration: lidocaine  Infiltration strength: 1 %  Dose: 2 mL  Needle  Needle gauge: 21 G  Needle length: 10 cm  Needle localization: ultrasound guidance     image stored in chart  Assessment  Injection assessment: negative aspiration for heme, no paresthesia on injection, incremental injection and local visualized surrounding nerve on ultrasound  Heart rate change: no  Additional Notes  Time out performed. 20 ml .5 % Ropivacaine with 5mg Decadron used in divided doses. Patient tolerated procedure well.

## 2023-12-27 NOTE — H&P
"History Of Present Illness  Allison Foley is a 53 y.o. female presenting with left shoulder pain.     Past Medical History  Past Medical History:   Diagnosis Date    Arthritis     Asthma     COPD (chronic obstructive pulmonary disease) (CMS/[facility])     Diabetes mellitus (CMS/[facility])     Disease of thyroid gland     Fibromyalgia, primary     Hashimoto's thyroiditis     Hyperlipidemia     Hypertension     Hypothyroidism     Lymphedema     bilateral legs    Migraines     PONV (postoperative nausea and vomiting)     Sleep apnea     uses CPAP       Surgical History  Past Surgical History:   Procedure Laterality Date    ACHILLES TENDON SURGERY Bilateral     ANKLE SURGERY Bilateral     \"muscle surgery\"    APPENDECTOMY      as a child    BREAST LUMPECTOMY Right      SECTION, LOW TRANSVERSE      x 2    CHOLECYSTECTOMY      COLONOSCOPY      HERNIA REPAIR      HYSTERECTOMY      KNEE ARTHROPLASTY Bilateral     bilateral    KNEE ARTHROSCOPY W/ PARTIAL MEDIAL MENISCECTOMY Left         Social History  She reports that she quit smoking about 13 years ago. Her smoking use included cigarettes. She has never used smokeless tobacco. She reports that she does not currently use alcohol. She reports that she does not currently use drugs.    Family History  No family history on file.     Allergies  Adhesive tape-silicones, Latex, and Sulfa (sulfonamide antibiotics)    Review of Systems     Physical Exam:  HEENT: Head is normocephalic and atraumatic  Chest and lungs: Clear to auscultation  Heart: Regular rate rhythm  Abdomen: Positive bowel sounds soft nontender  Extremities: Positive Jobes test on the left     Last Recorded Vitals  Blood pressure 105/64, pulse 61, temperature 35.8 °C (96.4 °F), temperature source Temporal, resp. rate 12, height 1.626 m (5' 4\"), weight 107 kg (235 lb 3.7 oz), SpO2 100 %.    Relevant Results      Imaging:  MR shoulder left wo IV contrast  Narrative: Interpreted By:  Alex Montague,   STUDY:  MRI of the  left " shoulder without IV contrast;  11/10/2023 10:57 am      INDICATION:  Signs/Symptoms:History of rotator cuff tear, progressive symptoms.      COMPARISON:  10/27/2023      ACCESSION NUMBER(S):  [id number]      ORDERING CLINICIAN:  ALEXANDRO REVELES      TECHNIQUE:  MR imaging of the  left shoulder was obtained  without IV contrast.      FINDINGS:  ROTATOR CUFF TENDONS:  There is a full-thickness tear of the anterior insertional  supraspinatus tendon, measuring 14 mm in AP dimension. High-grade,  partial-thickness articular surface tearing extends posteriorly into  the anterior infraspinatus tendon by approximately 5 mm. These  findings are superimposed on mild tendinosis. No tendon retraction.  The teres minor tendon is intact. The subscapularis tendon  demonstrates mild tendinosis with partial-thickness bursal sided  tearing superiorly. Mild supraspinatus volume loss.      BICEPS TENDON AND ROTATOR INTERVAL:  The intra-articular long head biceps tendon is intact and has a  normal course. The rotator interval is unremarkable.      JOINTS:  Moderate acromioclavicular degenerative change with downward  projecting osteophytes and mass effect on the subjacent supraspinatus  myotendinous junction.      Evaluation of the glenohumeral articulation demonstrates no articular  cartilage defects.      No evidence of significant joint effusion. Small volume subacromial  subdeltoid bursal fluid.      LABRUM:  There is superior, anterior superior, and posterosuperior  degenerative labral truncation.      OSSEOUS STRUCTURES:  No focal marrow replacing lesions are identified. There is no  fracture.      SOFT TISSUES:  The suprascapular nerve is intact at the suprascapular and  spinoglenoid notches.      Impression: Full-thickness tear of the anterior insertional supraspinatus tendon,  measuring 14 mm in AP dimension. There is high-grade,  partial-thickness articular surface tearing extending into the  anterior infraspinatus tendon by 5  mm. Mild tendinosis. Mild  supraspinatus volume loss.      Moderate acromioclavicular osteoarthrosis with findings which may  reflect external impingement the appropriate clinical scenario.      MACRO:  None      Signed by: Alex Montague 11/10/2023 11:03 AM  Dictation workstation:   [id number]        Assessment/Plan   Active Problems:    Complete rotator cuff tear or rupture of left shoulder, not specified as traumatic    Impingement syndrome of left shoulder    HTN (hypertension)    Hyperlipidemia    Diabetes mellitus, type 2 (CMS/HCC)    Hypothyroidism      Plan: Arthroscopic left rotator cuff repair.  The procedure has been explained along with the risks, benefits and alternatives being reviewed.  Questions were answered.             Gustavo Villegas MD

## 2023-12-27 NOTE — OP NOTE
Repair Arthroscopy Rotator Cuff Shoulder LEFT DIABETIC, ALLERGY ADHESIVE TAPE AND LATEX, LATERAL DECUB (L) Operative Note     Date: 2023  OR Location: [facility]    Name: Allison Foley : 1970, Age: 53 y.o., MRN: [id number], Sex: female    Diagnosis  Pre-op Diagnosis     * Complete rotator cuff tear or rupture of left shoulder, not specified as traumatic [M75.122]     * Impingement syndrome of left shoulder [M75.42] Post-op Diagnosis     * Complete rotator cuff tear or rupture of left shoulder, not specified as traumatic [M75.122]     * Impingement syndrome of left shoulder [M75.42]     Procedures  Repair Arthroscopy Rotator Cuff Shoulder LEFT DIABETIC, ALLERGY ADHESIVE TAPE AND LATEX, LATERAL DECUB  60748 - TN SURGICAL ARTHROSCOPY SHOULDER W/ROTATOR CUFF RPR    TN SURGICAL ARTHROSCOPY LUCERO W/CORACOACRM LIGM RLS [08730]  Surgeons      * Gustavo Villegas - Primary    Resident/Fellow/Other Assistant:  Surgeon(s) and Role:     * Brent Arreola PA-C - Assisting    Procedure Summary  Anesthesia: General  ASA: III  Anesthesia Staff: Anesthesiologist: Chucho Patel DO  CRNA: LUC Campbell-CRNA  Estimated Blood Loss: Minimal  Intra-op Medications:   Medication Name Total Dose   sodium chloride 0.9 % irrigation solution 12,000 mL   EPINEPHrine HCl (PF) (Adrenalin) injection 1 mg   ceFAZolin in dextrose (iso-os) (Ancef) IVPB 2 g 2 g              Anesthesia Record               Intraprocedure I/O Totals          Intake    ceFAZolin in dextrose (iso-os) (Ancef) IVPB 2 g 100.00 mL    Total Intake 100 mL          Specimen: No specimens collected     Staff:   Circulator: Rosanne Castaneda RN  Scrub Person: Tessa Alfaro         Drains and/or Catheters: * None in log *    Tourniquet Times:         Implants:  Implants       Type Name Action Serial No.      Implant SUTURE, 2.6 FIBERTAK RC SP, 1.7 TIGERTAPE LOOP, /[facility] - [id number] Implanted      Implant SUTURE, 2.6 FIBERTAK RC SP, 1.7 TIGERTAPE LOOP, /[facility] - [id number]  Implanted               Findings: Downsloping anterior acromion, full-thickness rotator cuff tear    Indications: Allison Foley is an 53 y.o. female who is having surgery for Complete rotator cuff tear or rupture of left shoulder, not specified as traumatic [M75.122]  Impingement syndrome of left shoulder [M75.42].     The patient was seen in the preoperative area. The risks, benefits, complications, treatment options, non-operative alternatives, expected recovery and outcomes were discussed with the patient. The possibilities of reaction to medication, pulmonary aspiration, injury to surrounding structures, bleeding, recurrent infection, the need for additional procedures, failure to diagnose a condition, and creating a complication requiring transfusion or operation were discussed with the patient. The patient concurred with the proposed plan, giving informed consent.  The site of surgery was properly noted/marked if necessary per policy. The patient has been actively warmed in preoperative area. Preoperative antibiotics have been ordered and given within 1 hours of incision. Venous thrombosis prophylaxis are not indicated.    Procedure Details: Findings (Outerbridge classification):  Glenoid: 2  Humeral head: 2-3     Procedure:  The patient was met in pre-operative holding and the appropriate extremity was marked.  The patient was transported to the operating room and underwent general anesthesia.  The patient was placed in a lateral position with all bony prominences well padded including the common peroneal nerve and ulnar nerve.  An axillary roll was placed.  The ipsilateral arm was suspended with 10lbs of weight and an arm duran was carefully wrapped around the arm.  Antibiotics were administered according to protocol.   After prep, drape, antibiotics and time out the bony landmarks were palpated.     A surgical timeout was performed.  The patient was identified, the site and laterality confirmed along with  administration of antibiotics.    Next I passed a spinal needle posteriorly and infiltrated the glenohumeral joint with arthroscopic fluid.  I received a return of fluid confirming my proper positioning.       A posterior portal was created with an 11 blade and the glenohumeral joint was entered using a blunt trochar without issue.  A diagnostic arthroscopy was performed evaluating the articular cartilage of the humeral head and glenoid, the subscapularis, under surface of the supra and infraspinatus, the long head of the biceps, labrum and axillary recess.       An anterior portal was created in the rotator interval (outside / in) with an 18 G spinal needle and a blunt trocar was inserted. A probe was introduced and the biceps / labrum were palpated.     The rotator cuff tear was identified.  This involved the supraspinatus.  The biceps, subscapularis, infraspinatus were intact.  No loose bodies were noted.  No labral tear was noted.     The scope was introduce into the subacromial space.  I used the cannula for the arthroscope as a switching stick to reestablish my anterior portal for inflow into the subacromial space.  Upon entering the subacromial space I did note a thickened and hypertrophied bursa.  A lateral portal was created and using a shaver and cautery wand a thorough bursectomy was performed. The rotator cuff was evaluated from the bursal side.     The patient was noted to have significant bursal inflammation.  After the bursectomy the acromion was inspected and noted to have some downsloping.  Using the lateral portal an acromioplasty was performed. The coracohumeral ligament was slightly recessed.   The acromioplasty was performed until the cuff was deemed to have appropriate space and the acromion had a smooth surface.     The patient's rotator cuff was evaluated from its articular side and bursal side.  The patient was noted to have a full-thickness tear involving the supraspinatus.  We felt the  patient was suitable for a rotator cuff repair.  The cannulas were placed.  The greater tuberosity was gently decorticated.   Once the cuff had been appropriately identified, a percutaneous spinal needle was placed just off the edge of the acromion.  Then in standard fashion by making a small skin incision and using an awl to create the  holes.  I placed 2 medial row anchors from anterior to posterior and passed the suture through the cuff with the PA retrieving the limbs.  I placed 1 fiber link posteriorly.  It was incorporated into the anterior lateral anchor.  The PA then  the limbs of suture.  Next I placed 2 lateral row anchors.  This was done by using the awl.  Taking a single limb from the medial row anchors and incorporating them into a swivel lock anchor.  This was done from posterior to anterior.  The anchor was then engaged into the lateral tuberosity.  Being satisfied with the repair, attention was turned to closure.        The shoulder was copiously lavaged and portal sites and percutaneous incisions closed using 3-0 nylon suture. Sterile 4 x 4's, abd pads were placed followed by foam tape.  The patient was placed in a sling and stable to recovery.  All counts were reported as correct.  There were no complications.     The physician assistant was present for the entire case.  Given the nature of the procedure and disease process a skilled surgical assistant was necessary for the case.  The assistant was necessary for retraction and helped directly facilitate completion of the surgery.  A certified scrub tech was at the back table managing instruments and supplies for the surgical procedure.    Complications:  None; patient tolerated the procedure well.    Disposition: PACU - hemodynamically stable.  Condition: stable         Additional Details: Not applicable    Attending Attestation: I performed the procedure.    Gusatvo Villegas  Phone Number: [phone]

## 2023-12-27 NOTE — ANESTHESIA PROCEDURE NOTES
Airway  Date/Time: 12/27/2023 7:38 AM  Urgency: elective      Staffing  Performed: CRNA   Authorized by: Chucho Patel DO    Performed by: LUC Campbell-CRNA  Patient location during procedure: OR    Indications and Patient Condition  Indications for airway management: anesthesia  Spontaneous ventilation: present  Sedation level: deep  Preoxygenated: yes  Patient position: sniffing  MILS maintained throughout  Mask difficulty assessment: 1 - vent by mask    Final Airway Details  Final airway type: endotracheal airway      Successful airway: ETT  Cuffed: yes   Successful intubation technique: direct laryngoscopy  Endotracheal tube insertion site: oral  Blade: Barajas  Blade size: #2  ETT size (mm): 7.0  Cormack-Lehane Classification: grade I - full view of glottis  Placement verified by: chest auscultation   Measured from: lips  ETT to lips (cm): 21  Number of attempts at approach: 1  Number of other approaches attempted: 0

## 2023-12-27 NOTE — ANESTHESIA POSTPROCEDURE EVALUATION
Patient: Allison Foley    Procedure Summary       Date: 12/27/23 Room / Location: [facility] 12 / [facility]    Anesthesia Start: 0730 Anesthesia Stop: 0855    Procedure: Repair Arthroscopy Rotator Cuff Shoulder LEFT DIABETIC, ALLERGY ADHESIVE TAPE AND LATEX, LATERAL DECUB (Left: Shoulder) Diagnosis:       Complete rotator cuff tear or rupture of left shoulder, not specified as traumatic      Impingement syndrome of left shoulder      (Complete rotator cuff tear or rupture of left shoulder, not specified as traumatic [M75.122])      (Impingement syndrome of left shoulder [M75.42])    Surgeons: Gustavo Villegas MD Responsible Provider: Chucho Patel DO    Anesthesia Type: general, regional ASA Status: 3            Anesthesia Type: general, regional    Vitals Value Taken Time   /85 12/27/23 0851   Temp 36 12/27/23 0855   Pulse 60 12/27/23 0855   Resp 19 12/27/23 0854   SpO2 98 % 12/27/23 0854   Vitals shown include unvalidated device data.    Anesthesia Post Evaluation    Patient location during evaluation: PACU  Patient participation: complete - patient participated  Level of consciousness: awake  Pain score: 1  Pain management: adequate  Airway patency: patent  Cardiovascular status: acceptable  Respiratory status: acceptable  Hydration status: acceptable  Postoperative Nausea and Vomiting: none        There were no known notable events for this encounter.

## 2023-12-28 ENCOUNTER — APPOINTMENT (OUTPATIENT)
Dept: ORTHOPEDIC SURGERY | Facility: CLINIC | Age: 53
End: 2023-12-28
Payer: COMMERCIAL

## 2024-01-04 ENCOUNTER — OFFICE VISIT (OUTPATIENT)
Dept: ORTHOPEDIC SURGERY | Facility: CLINIC | Age: 54
End: 2024-01-04
Payer: COMMERCIAL

## 2024-01-04 DIAGNOSIS — Z98.890 S/P LEFT ROTATOR CUFF REPAIR: Primary | ICD-10-CM

## 2024-01-04 PROCEDURE — 99024 POSTOP FOLLOW-UP VISIT: CPT | Performed by: ORTHOPAEDIC SURGERY

## 2024-01-04 PROCEDURE — 1036F TOBACCO NON-USER: CPT | Performed by: ORTHOPAEDIC SURGERY

## 2024-01-04 NOTE — PROGRESS NOTES
History of Present Illness  No chief complaint on file.      The patient is status post side: left shoulder arthroscopy with a subacromial decompression, distal clavicle excision, and rotator cuff repair repair.  Date of surgery December 27, 2023  they complain of mild pain.  They deny any numbness or tingling in the right upper extremity.    Review of Systems   GENERAL: Negative for malaise, significant weight loss, fever  MUSCULOSKELETAL: see HPI  NEURO:  Negative    Exam  Portal sites are healing well.  There is no erythema, warmth or purulent drainage.  They have intact sensation along the lateral border of the upper arm.  They have intact thumb extension, wrist extension, finger flexion.  Range of motion:  not tested    Assessment  Status post side: left shoulder arthroscopy with subacromial decompression, distal clavicle excision, and rotator cuff repair    Plan  sutures removed, intra operative images reviewed with the patient, continue sling, and begin gentle codman and pendulum exercises  I will see them back in 5 weeks for recheck, sooner if there is any problems.    Pain medication refilled: No  All questions were answered.

## 2024-02-07 DIAGNOSIS — Z98.890 S/P LEFT ROTATOR CUFF REPAIR: ICD-10-CM

## 2024-02-08 NOTE — PROGRESS NOTES
History of Present Illness  No chief complaint on file.      The patient is status post side: left shoulder arthroscopy with a rotator cuff repair repair.  Date of surgery December 27, 2023  they complain of mild pain.  They deny any numbness or tingling in the right upper extremity.    Review of Systems   GENERAL: Negative for malaise, significant weight loss, fever  MUSCULOSKELETAL: see HPI  NEURO:  Negative    Exam  Portal sites are healing well.  There is no erythema, warmth or purulent drainage.  They have intact sensation along the lateral border of the upper arm.  They have intact thumb extension, wrist extension, finger flexion.  Range of motion: Forward flexion 95 degrees internal rotation to the back pocket external rotation to 70 degrees abduction to 70 degrees with shoulder hike    Assessment  Status post side: left shoulder arthroscopy with rotator cuff repair    Plan  Discontinue pillow of the sling  Begin physical therapy  I will see them back in 6 weeks for recheck, sooner if there is any problems.    Ibuprofen 800  Pain medication refilled: No  All questions were answered.

## 2024-02-09 ENCOUNTER — OFFICE VISIT (OUTPATIENT)
Dept: ORTHOPEDIC SURGERY | Facility: CLINIC | Age: 54
End: 2024-02-09
Payer: COMMERCIAL

## 2024-02-09 VITALS — BODY MASS INDEX: 40.34 KG/M2 | WEIGHT: 235 LBS

## 2024-02-09 DIAGNOSIS — Z98.890 S/P LEFT ROTATOR CUFF REPAIR: Primary | ICD-10-CM

## 2024-02-09 PROCEDURE — 99024 POSTOP FOLLOW-UP VISIT: CPT | Performed by: ORTHOPAEDIC SURGERY

## 2024-02-09 PROCEDURE — 1036F TOBACCO NON-USER: CPT | Performed by: ORTHOPAEDIC SURGERY

## 2024-02-09 RX ORDER — NAPROXEN 500 MG/1
500 TABLET ORAL
COMMUNITY
Start: 2023-04-20

## 2024-02-09 RX ORDER — LANCETS 33 GAUGE
EACH MISCELLANEOUS
COMMUNITY
Start: 2024-01-25

## 2024-02-09 RX ORDER — FLASH GLUCOSE SENSOR
KIT MISCELLANEOUS
COMMUNITY
Start: 2024-02-06

## 2024-02-09 RX ORDER — IPRATROPIUM BROMIDE AND ALBUTEROL SULFATE 2.5; .5 MG/3ML; MG/3ML
3 SOLUTION RESPIRATORY (INHALATION) EVERY 4 HOURS PRN
COMMUNITY
Start: 2020-05-06

## 2024-02-09 RX ORDER — IBUPROFEN 800 MG/1
800 TABLET ORAL 3 TIMES DAILY
Qty: 90 TABLET | Refills: 0 | Status: SHIPPED | OUTPATIENT
Start: 2024-02-09 | End: 2024-03-13 | Stop reason: SDUPTHER

## 2024-02-09 ASSESSMENT — PAIN SCALES - GENERAL: PAINLEVEL_OUTOF10: 3

## 2024-02-09 ASSESSMENT — PAIN DESCRIPTION - DESCRIPTORS: DESCRIPTORS: DULL;ACHING

## 2024-02-09 ASSESSMENT — PAIN - FUNCTIONAL ASSESSMENT: PAIN_FUNCTIONAL_ASSESSMENT: 0-10

## 2024-02-19 ENCOUNTER — EVALUATION (OUTPATIENT)
Dept: PHYSICAL THERAPY | Facility: CLINIC | Age: 54
End: 2024-02-19
Payer: COMMERCIAL

## 2024-02-19 DIAGNOSIS — M75.122 COMPLETE TEAR OF LEFT ROTATOR CUFF, UNSPECIFIED WHETHER TRAUMATIC: Primary | ICD-10-CM

## 2024-02-19 DIAGNOSIS — Z98.890 S/P LEFT ROTATOR CUFF REPAIR: ICD-10-CM

## 2024-02-19 PROCEDURE — 97161 PT EVAL LOW COMPLEX 20 MIN: CPT | Mod: GP

## 2024-02-19 PROCEDURE — 97110 THERAPEUTIC EXERCISES: CPT | Mod: GP

## 2024-02-19 ASSESSMENT — PAIN SCALES - GENERAL: PAINLEVEL_OUTOF10: 1

## 2024-02-19 ASSESSMENT — PATIENT HEALTH QUESTIONNAIRE - PHQ9
2. FEELING DOWN, DEPRESSED OR HOPELESS: NOT AT ALL
SUM OF ALL RESPONSES TO PHQ9 QUESTIONS 1 AND 2: 0
1. LITTLE INTEREST OR PLEASURE IN DOING THINGS: NOT AT ALL

## 2024-02-19 ASSESSMENT — PAIN DESCRIPTION - DESCRIPTORS: DESCRIPTORS: ACHING

## 2024-02-19 ASSESSMENT — PAIN - FUNCTIONAL ASSESSMENT: PAIN_FUNCTIONAL_ASSESSMENT: 0-10

## 2024-02-19 NOTE — PROGRESS NOTES
Physical Therapy      Physical Therapy Evaluation and Treatment      Patient Name: Allison Foley  MRN: [id number]  Today's Date: 2/19/2024  Time Calculation  Start Time: 1603  Stop Time: 1645  Time Calculation (min): 42 min      Assessment:  Patient presents with L shoulder pain and limited ROM and function which is likely d/t being s/p L RTC repair that was performed on 12/27/23 by Dr. Gustavo Villegas. Pt. is likely to benefit from skilled interventions to address their impairments, & treatment to emphasize manual techniques to decrease pain & improve joint/soft-tissue mobility of the L shoulder and Upper extremity exercises to increase ROM, strength, endurance, function & neuromotor control.    Standardized testing and measures administered today reveal that the patient has multiple impairments in body structures and functions, activity limitations, and participation restrictions. The patient has 1 personal factors and comorbidities that may serve as barriers affecting the plan of care, including R shoulder problems The patient's clinical presentation includes stable & uncomplicated characteristics as noted during today's evaluation, & these findings indicate that this patient is of Low complexity. Skilled PT services are warranted in order to realize measurable change in the above outcome measures and achieve improvements in the patient's functional status and individual goals.      Plan:  Treatment/Interventions: Education/ Instruction, Manual therapy, Therapeutic activities, Therapeutic exercises  PT Plan: Skilled PT (Focus on L shoulder ROM, strength, endurance and function as tolerated in MD protocol)  PT Frequency: Other (Comment) (2x/week for 4 weeks then 1x/week for 6 weeks for 14 visits)  Duration: 10 weeks  Onset Date: 12/27/23  Number of Treatments Authorized: BOA  Rehab Potential: Good  Plan of Care Agreement: Patient    Current Problem:   1. Complete tear of left rotator cuff, unspecified whether traumatic         2. S/P left rotator cuff repair  Referral to Physical Therapy          Insurance: [facility]  Visits Approved: BOA  Visit Number: 1            Subjective    General:  General Comment: L RTC repair 12/27/23    Pt. is a 53 year old F , L handed individual ,who presents for a physical therapy evaluation s/p L arthroscopic RTC repair that was performed by Dr. Gustavo Villegas on 12/27/23. Pt has persistent night pain. Pt has intermittent numbness and tingling in the L upper extremity but denies any currently.     Patient has 0/10 pain currently, with 7/10 at worst and 0/10 at best.     Relieving factors: rest    Exacerbating factors: lifting reaching       PMH:  see intake form    PSH:  B/L carpal    PLOF: I ADLs    Precautions:  Precautions  Precautions Comment: L RTC repair 12/27/23  Vital Signs:     Pain:  Pain Assessment  Pain Assessment: 0-10  Pain Score: 1  Pain Location: Shoulder  Pain Orientation: Left  Pain Descriptors: Aching  Clinical Progression: Gradually improving    Observation: Pt arrives AMB IND with no antalgic gait. Pt has slight forward head and rounded shoulder posture. Surgical scars are healing well with no signs of infection.    Palpation: soft tissue restriction noted with L GH joint play    UE Dermatomes + Myotomes  WNL B/L    Cervical ROM (WNL, Min, Mod, Max)   Flex     _WNL_     Ext      _Min_     R Rot  _Min_     L Rot   _Min_     R SB    _Min_     L SB    _Min_     *= pain  WNL=Within Normal Limits, Min= min impaired (~0-1/3 of ROM is deficient), Mod= moderately impaired (~1/3-2/3 of ROM is deficient), Max = maximally impaired (>2/3 ROM impaired)    Cervical Special Tests  Spurling   _-_    Shoulder ROM (degrees)                 R       L  Flex     _140*_   _125*_  ABD    _100*_   _75*_  IR        _90*_   _65*_  ER       _30*_   _35*_  *= pain    Shoulder MMT                 R       L  Flex     _4+/5_   _4/5_  Ext      _4+/5_   _4/5_  ABD    _4+/5_   _3+/5*_  IR        _4+/5_   _4/5_  ER        _4+/5_   _4/5_  *= pain    PT Evaluation Low Complexity (74690): PT Evaluation Time Entry  PT Evaluation (Low) Time Entry: 23    Treatment: PT Therapeutic Procedures Time Entry  Therapeutic Exercise Time Entry: 19    Therapeutic Exercise (44497):   *scapular retractions: 10x  *supine wand flex: 10x  *supine wand CP+: 15x  *supine wand ER: 10x  Band rows: RTB: 15x    (*denotes HEP)  Sheet with exercise descriptions and images issued. Skilled intervention utilized in the appropriate selection & application of above exercises. Verbal and tactile cues provided for proper form and technique. Pt. demonstrated appropriate form & verbalized understanding of optimal technique for above exercises.     Pt. Education:  The patient was educated on: the importance of positioning, proper posture, and body mechanics, joint mechanics and pathology, general tissue healing time, the appropriate use of heat and cold to control pain and inflammation, the importance of general therapeutic exercise, especially to stay within pain-free ROM, specific anatomy, function, & regional interdependence of involved areas, & likely cause of impairments & POC. Pt's questions were answered to their satisfaction, & pt. verbalized understanding & agreement with POC.      Goals:    Demonstrate independence with home exercise program  Tolerate increased exercise without adverse reaction  Demonstrate improved posture & proper body mechanics throughout session  Increase L shoulder ROM to pain free + WFL  Report decrease in pain by greater than or equal to 2 points to meet the MCID  Report decrease in Quick DASH by greater than or equal to 10 points to meet the MCID (IE 68.18)  Perform ADLs without an increase symptoms  Perform overhead activities without increase in symptoms  Pt. will be able to sleep through the night without an increase in symptoms    Outcome Measures:  Other Measures  Disability of Arm Shoulder Hand (DASH): 68.18 (QuickDASH)

## 2024-03-06 ENCOUNTER — TREATMENT (OUTPATIENT)
Dept: PHYSICAL THERAPY | Facility: CLINIC | Age: 54
End: 2024-03-06
Payer: COMMERCIAL

## 2024-03-06 DIAGNOSIS — Z98.890 S/P LEFT ROTATOR CUFF REPAIR: Primary | ICD-10-CM

## 2024-03-06 DIAGNOSIS — M75.122 COMPLETE TEAR OF LEFT ROTATOR CUFF, UNSPECIFIED WHETHER TRAUMATIC: ICD-10-CM

## 2024-03-06 PROCEDURE — 97110 THERAPEUTIC EXERCISES: CPT | Mod: GP,CQ

## 2024-03-06 ASSESSMENT — PAIN SCALES - GENERAL: PAINLEVEL_OUTOF10: 1

## 2024-03-06 ASSESSMENT — PAIN - FUNCTIONAL ASSESSMENT: PAIN_FUNCTIONAL_ASSESSMENT: 0-10

## 2024-03-06 NOTE — PROGRESS NOTES
Physical Therapy Treatment    Patient Name: Allison Foley  MRN: [id number]  Today's Date: 3/6/2024  Time Calculation  Start Time: 0720  Stop Time: 0800  Time Calculation (min): 40 min  PT Therapeutic Procedures Time Entry  Therapeutic Exercise Time Entry: 38       Insurance: [facility]  Visits Approved: BOA  Visit Number: 2/14    Current Problem  1. S/P left rotator cuff repair        2. Complete tear of left rotator cuff, unspecified whether traumatic            Subjective   General     Precautions     Pain  Pain Assessment: 0-10  Pain Score: 1    Objective   Treatments:  *supine wand flex: x20  *supine wand CP+: x20  *supine wand ER: x20  Stand wand Ext/IR: x20  Band rows: RTB: x20  TB ER/IR RTB: x20  Jobes x20    Assessment:   Pt. Still having some pain w/ all exercises but was able to complete them. Added Jobes for increasing strength. Pt. Will continue w/ current HEP.     Plan:   Continue w/ current POC.     OP EDUCATION:       Goals:

## 2024-03-11 ENCOUNTER — TREATMENT (OUTPATIENT)
Dept: PHYSICAL THERAPY | Facility: CLINIC | Age: 54
End: 2024-03-11
Payer: COMMERCIAL

## 2024-03-11 DIAGNOSIS — M75.122 COMPLETE TEAR OF LEFT ROTATOR CUFF, UNSPECIFIED WHETHER TRAUMATIC: Primary | ICD-10-CM

## 2024-03-11 DIAGNOSIS — Z98.890 S/P LEFT ROTATOR CUFF REPAIR: ICD-10-CM

## 2024-03-11 PROCEDURE — 97110 THERAPEUTIC EXERCISES: CPT | Mod: GP,CQ

## 2024-03-11 ASSESSMENT — PAIN DESCRIPTION - DESCRIPTORS: DESCRIPTORS: DULL

## 2024-03-11 ASSESSMENT — PAIN - FUNCTIONAL ASSESSMENT: PAIN_FUNCTIONAL_ASSESSMENT: 0-10

## 2024-03-11 ASSESSMENT — PAIN SCALES - GENERAL: PAINLEVEL_OUTOF10: 2

## 2024-03-11 NOTE — PROGRESS NOTES
"Physical Therapy Treatment    Patient Name: Allison Foley  MRN: [id number]  Today's Date: 3/11/2024  Time Calculation  Start Time: 0748  Stop Time: 0835  Time Calculation (min): 47 min    Insurance  [facility]  Visits Approved: BOA  Visit Number: 3/14     Current Problem  1. Complete tear of left rotator cuff, unspecified whether traumatic        2. S/P left rotator cuff repair        General Comment: L RTC repair 12/27/23     Subjective    General   \"It's not bad.  I just deal with it.\"  Pt reports she has some difficulty still using L arm with daily activities, \"but overall, it's getting better.\"  She reports doing what she recalls of ex's at home.  States she would like handouts.     Precautions   No recent falls reported  Precautions Comment: L RTC repair 12/27/23     Pain   Pain Assessment  Pain Assessment: 0-10  Pain Score: 1  Pain Location: Shoulder  Pain Orientation: Left, Anterior  Pain Descriptors: Dull  Pain Frequency: Intermittent  Clinical Progression: Gradually improving  Effect of Pain on Daily Activities: Sleep, Reaching, Lifting     Objective   No c/o numbness, tingling    Treatments:  Therapeutic Exercises (76999): 44 Minutes, 3 Units    Activities:  supine wand flex: x20  supine wand CP: x20  Supine Punch: x20  supine wand ER: x20  Stand wand Ext/IR: x20  Band rows: RTB: x20  TB ER/IR RTB: x20  Jobes x20  Sidelying Ex's (4way): x15 each  B TB ER:  RTB, x15     Assessment:   Pt exhibits good recall of current activities, suggesting good compliance with HEP at this time.  Some vc'ing needed for correct technique, with fairly good follow through observed.  She was able to progress with activities this visit, adding supine punch w/wand, sidelying ex's (4way), and B TB ER to the activities performed for further strengthening.  She was appropriately challenged with the activities given.  Normal muscle soreness, fatigue noted.  Pt provided with updated HEP this visit (including all activities initiated " previous visit).  Demonstrates good ability to continue with POC & progressions as tolerated.    Plan:   Continue with rehab POC & progress as tolerated to increase ROM, strength, stability to improve functional mobility & endurance, capacity for performing daily activities.    OP EDUCATION:  Access Code: [email or web address]  URL: [email or web address]  Date: 03/11/2024  Prepared by: Lavern Woodard    Exercises  - Seated Scapular Retraction  - 1 x daily - 7 x weekly - 3 sets - 10 reps - 5 hold  - Supine Cervical Retraction with Towel  - 1 x daily - 7 x weekly - 3 sets - 10 reps - 5 hold  - Sidelying Shoulder External Rotation  - 1 x daily - 7 x weekly - 3 sets - 10 reps  - Sidelying Shoulder Abduction Palm Forward  - 1 x daily - 7 x weekly - 3 sets - 10 reps  - Sidelying Shoulder Flexion 15 Degrees  - 1 x daily - 7 x weekly - 3 sets - 10 reps  - Sidelying Shoulder Horizontal Abduction  - 1 x daily - 7 x weekly - 3 sets - 10 reps  - Shoulder External Rotation and Scapular Retraction with Resistance  - 1 x daily - 7 x weekly - 3 sets - 10 reps - 5 hold  - Supine Shoulder External Rotation with Dowel  - 1 x daily - 7 x weekly - 3 sets - 10 reps  - Supine Shoulder Flexion with Dowel  - 1 x daily - 7 x weekly - 3 sets - 10 reps  - Supine Shoulder Press AAROM in Abduction with Dowel  - 1 x daily - 7 x weekly - 3 sets - 10 reps  - Supine Shoulder Protraction with Dowel  - 1 x daily - 7 x weekly - 3 sets - 10 reps  - Standing Shoulder Row with Anchored Resistance  - 1 x daily - 7 x weekly - 3 sets - 10 reps  - Shoulder Extension with Resistance  - 1 x daily - 7 x weekly - 3 sets - 10 reps  - Shoulder Internal Rotation with Resistance  - 1 x daily - 7 x weekly - 3 sets - 10 reps  - Shoulder External Rotation with Anchored Resistance  - 1 x daily - 7 x weekly - 3 sets - 10 reps  - Standing Shoulder Flexion to 90 Degrees  - 1 x daily - 7 x weekly - 3 sets - 10 reps  - Standing Shoulder Scaption  - 1 x daily - 7 x  weekly - 3 sets - 10 reps  - Shoulder Abduction - Thumbs Up  - 1 x daily - 7 x weekly - 3 sets - 10 reps

## 2024-03-13 ENCOUNTER — TREATMENT (OUTPATIENT)
Dept: PHYSICAL THERAPY | Facility: CLINIC | Age: 54
End: 2024-03-13
Payer: COMMERCIAL

## 2024-03-13 DIAGNOSIS — Z98.890 S/P LEFT ROTATOR CUFF REPAIR: ICD-10-CM

## 2024-03-13 DIAGNOSIS — Z98.890 S/P LEFT ROTATOR CUFF REPAIR: Primary | ICD-10-CM

## 2024-03-13 DIAGNOSIS — M75.122 COMPLETE TEAR OF LEFT ROTATOR CUFF, UNSPECIFIED WHETHER TRAUMATIC: ICD-10-CM

## 2024-03-13 PROCEDURE — 97110 THERAPEUTIC EXERCISES: CPT | Mod: GP,CQ

## 2024-03-13 ASSESSMENT — PAIN SCALES - GENERAL: PAINLEVEL_OUTOF10: 6

## 2024-03-13 ASSESSMENT — PAIN - FUNCTIONAL ASSESSMENT: PAIN_FUNCTIONAL_ASSESSMENT: 0-10

## 2024-03-13 ASSESSMENT — PAIN DESCRIPTION - DESCRIPTORS: DESCRIPTORS: DULL

## 2024-03-13 NOTE — PROGRESS NOTES
Physical Therapy Treatment    Patient Name: Allison Foley  MRN: [id number]  Today's Date: 3/13/2024  Time Calculation  Start Time: 0745  Stop Time: 0830  Time Calculation (min): 45 min     PT Therapeutic Procedures Time Entry  Therapeutic Exercise Time Entry: 43              Non-Billable Time  Non-billable time: 2  Insurance:   [facility]  Visits Approved: BOA  Visit Number: 4/14  Assessment:   Pt appropriately challenged w/ given ex's. Cues to stay on task. She has mod UT elevation and decreased scapulohumeral rhythm w/ ex's, agnes stdg jobes ex. No increased pain c/o w/ today's program. She will cont to benefit from skilled PT to address her deficit and improve her overall functional ability.     Plan:  Cont to progress as niyah.   Pt to bring TENS unit w/ her next visit to show her pad placement and make sure she understands how to use appropriately.     Current Problem  1. S/P left rotator cuff repair        2. Complete tear of left rotator cuff, unspecified whether traumatic            Subjective   General  Reason for Referral: L RTC repair  Referred By: Gustavo Villegas  General Comment: L RTC repair 12/27/23 Pt states she had some pain coming in today but took some Aleve which is helping.   Precautions       Pain  Pain Assessment: 0-10  Pain Score: 6  Pain Location: Shoulder  Pain Orientation: Left, Anterior  Pain Descriptors: Dull  Pain Frequency: Intermittent  Clinical Progression: Gradually improving  Effect of Pain on Daily Activities: Sleep, Reaching, Lifting    Objective   3/13/24 L shoulder Flex AAROM 135 deg    Treatments:  Therapeutic Exercise (75315):  supine wand flex, CP, ER : x20  Supine Punch: x20   Stand wand Ext/IR: x20   Band rows: RTB: x20  TB ER/IR RTB: x20  Jobes x20  Sidelying Ex's (4way): x15 each  B TB ER:  RTB  x15      EDUCATION:

## 2024-03-14 RX ORDER — IBUPROFEN 800 MG/1
800 TABLET ORAL 3 TIMES DAILY
Qty: 90 TABLET | Refills: 0 | Status: SHIPPED | OUTPATIENT
Start: 2024-03-14 | End: 2024-04-29

## 2024-03-18 ENCOUNTER — APPOINTMENT (OUTPATIENT)
Dept: PHYSICAL THERAPY | Facility: CLINIC | Age: 54
End: 2024-03-18
Payer: COMMERCIAL

## 2024-03-18 ENCOUNTER — CLINICAL SUPPORT (OUTPATIENT)
Dept: PHYSICAL THERAPY | Facility: CLINIC | Age: 54
End: 2024-03-18
Payer: COMMERCIAL

## 2024-03-18 DIAGNOSIS — Z98.890 S/P LEFT ROTATOR CUFF REPAIR: Primary | ICD-10-CM

## 2024-03-18 DIAGNOSIS — M75.122 COMPLETE TEAR OF LEFT ROTATOR CUFF, UNSPECIFIED WHETHER TRAUMATIC: ICD-10-CM

## 2024-03-18 PROCEDURE — 97110 THERAPEUTIC EXERCISES: CPT | Mod: GP,CQ

## 2024-03-18 NOTE — LETTER
March 18, 2024     Patient: Allison Foley   YOB: 1970   Date of Visit: 3/18/2024       To Whom It May Concern:    Allison Foley was seen in my clinic on 3/18/2024 at 3:30 pm. Please excuse Allison for her absence from work on this day to make the appointment.    If you have any questions or concerns, please don't hesitate to call.         Sincerely,         Luis Maguire, PTA        CC:   No Recipients

## 2024-03-18 NOTE — PROGRESS NOTES
Physical Therapy Treatment    Patient Name: Allison Foley  MRN: [id number]  Today's Date: 3/18/2024  Time Calculation  Start Time: 1545  Stop Time: 1610  Time Calculation (min): 25 min  PT Therapeutic Procedures Time Entry  Therapeutic Exercise Time Entry: 23       [facility]  Visits Approved: BOA  Visit Number: 5/14    Current Problem  1. S/P left rotator cuff repair        2. Complete tear of left rotator cuff, unspecified whether traumatic            Subjective   General   Pt. Reports she is doing well and has little to no pain.   Precautions     Pain       Objective   3/13/24 L shoulder Flex AAROM 135 deg   Treatments:   UBE 3/3  supine wand flex, CP, ER : x20  Supine Punch: --  Stand wand Ext/IR: x20   Band rows: RTB: x20  TB ER/IR RTB: x20  Jobes x20  Sidelying Ex's (4way): x20 each  B TB ER:  RTB  x15    Assessment:   Pt. Progressing w/ all exercises well w/o pain. No new exercises added today d/t time constraint. Pt. Educated on how to use her TENS unit at home. Pt. Will continue w/ current HEP.     Plan:   Continue w/ current POC.     OP EDUCATION:       Goals:

## 2024-03-20 ENCOUNTER — TREATMENT (OUTPATIENT)
Dept: PHYSICAL THERAPY | Facility: CLINIC | Age: 54
End: 2024-03-20
Payer: COMMERCIAL

## 2024-03-20 DIAGNOSIS — Z98.890 S/P LEFT ROTATOR CUFF REPAIR: Primary | ICD-10-CM

## 2024-03-20 DIAGNOSIS — M75.122 COMPLETE TEAR OF LEFT ROTATOR CUFF, UNSPECIFIED WHETHER TRAUMATIC: ICD-10-CM

## 2024-03-20 PROCEDURE — 97110 THERAPEUTIC EXERCISES: CPT | Mod: GP,CQ

## 2024-03-20 ASSESSMENT — PAIN DESCRIPTION - DESCRIPTORS: DESCRIPTORS: DULL;TIGHTNESS

## 2024-03-20 ASSESSMENT — PAIN - FUNCTIONAL ASSESSMENT: PAIN_FUNCTIONAL_ASSESSMENT: 0-10

## 2024-03-20 ASSESSMENT — PAIN SCALES - GENERAL: PAINLEVEL_OUTOF10: 0 - NO PAIN

## 2024-03-20 NOTE — LETTER
March 20, 2024     Patient: Allison Foley   YOB: 1970   Date of Visit: 3/20/2024       To Whom It May Concern:    Allison Foley was seen in my clinic on 3/20/2024 at 7:45 am. Please excuse Allison for her absence from work on this day to make the appointment.    If you have any questions or concerns, please don't hesitate to call.         Sincerely,         Alejandro Rogel, PTA        CC:   No Recipients

## 2024-03-20 NOTE — PROGRESS NOTES
"Physical Therapy Treatment    Patient Name: Allison Foley  MRN: [id number]  Today's Date: 3/20/2024  Time Calculation  Start Time: 0745  Stop Time: 0830  Time Calculation (min): 45 min     PT Therapeutic Procedures Time Entry  Therapeutic Exercise Time Entry: 45                 Insurance:   [facility]  Visits Approved: BOA  Visit Number: 6/14  Assessment:   Progressed her program today w/ addition of supine bilat tband horiz abd (red tband) w/ emphasis on controlled motion. Cues for positioning/technique and pacing w/ ex's w/ overall good f/t. She cont's to be most limited w/ abd and ER motions at this time however it is improving as well as causing her less discomfort at end ranges. She will cont to benefit from skilled PT to address her deficits.     Plan:  OP PT Plan  PT Plan: Skilled PT Continue w/ current POC.     Current Problem  1. S/P left rotator cuff repair        2. Complete tear of left rotator cuff, unspecified whether traumatic            Subjective   General  Reason for Referral: L RTC repair  Referred By: Gustavo Villegas  General Comment: L RTC repair 12/27/23 Pt had no pain c/o upon entering the clinic. Pain increases \"with movement\" 4/10. \"There's some tension in there but that's to be expected.\"   Precautions       Pain  Pain Assessment: 0-10  Pain Score: 0 - No pain  Pain Location: Shoulder  Pain Orientation: Left, Anterior  Pain Descriptors: Dull, Tightness  Clinical Progression: Gradually improving  Effect of Pain on Daily Activities: Sleep, Reaching, Lifting    Objective   3/20/24 L shoulder Flex AROM 110 deg (UT elevation)     Treatments:  Therapeutic Exercise (50747):  UBE 3/3  supine wand flex, CP, ER : x20  Supine Punch: (no cane) x20   Stand wand Ext/IR: x20 ---  Band rows: RTB: x20  TB ER/IR RTB: x20  Jobes x20   Sidelying Ex's (4way): x20 each  B TB ER/HABD :  RTB  2x10 (supine)    EDUCATION:  Outpatient Education  Equipment: Thera-Band (Red)  "

## 2024-03-22 NOTE — DISCHARGE INSTRUCTIONS
Close follow-up with your established cardiology group, also contact your Coumadin clinic as you may need adjustments in your dosing, no changes in your medications, if any symptoms worsen change or other concerns go to the nearest emergency room for reevaluation otherwise outpatient follow-up. I will defer ordering another stress test to your cardiology and/or primary care group at this time. Surgical Assessment Completed on: 22-Mar-2024 11:05

## 2024-03-25 ENCOUNTER — TREATMENT (OUTPATIENT)
Dept: PHYSICAL THERAPY | Facility: CLINIC | Age: 54
End: 2024-03-25
Payer: COMMERCIAL

## 2024-03-25 DIAGNOSIS — Z98.890 S/P LEFT ROTATOR CUFF REPAIR: ICD-10-CM

## 2024-03-25 DIAGNOSIS — M75.122 COMPLETE TEAR OF LEFT ROTATOR CUFF, UNSPECIFIED WHETHER TRAUMATIC: Primary | ICD-10-CM

## 2024-03-25 PROCEDURE — 97110 THERAPEUTIC EXERCISES: CPT | Mod: GP,CQ

## 2024-03-25 ASSESSMENT — PAIN - FUNCTIONAL ASSESSMENT: PAIN_FUNCTIONAL_ASSESSMENT: 0-10

## 2024-03-25 ASSESSMENT — PAIN SCALES - GENERAL: PAINLEVEL_OUTOF10: 0 - NO PAIN

## 2024-03-25 NOTE — PROGRESS NOTES
"Physical Therapy Treatment    Patient Name: Allison Foley  MRN: [id number]  Today's Date: 3/25/2024  Time Calculation  Start Time: 0746  Stop Time: 0832  Time Calculation (min): 46 min    Insurance  [facility]  Visits Approved: BOA  Visit Number: 7/14    Current Problem  1. Complete tear of left rotator cuff, unspecified whether traumatic        2. S/P left rotator cuff repair        General  Reason for Referral: L RTC repair  Referred By: Gustavo Villegas  General Comment: L RTC repair 12/27/23    Subjective    General   Pt reports she is doing better when she needs to reach for things or carry grocery bags.  Still not able to lift any heavy.  States \"I can feel it getting better.  I can wrestle with my dogs.\"  States she is hoping to be able to return to work soon.    Precautions  Precautions  Precautions Comment: L RTC repair 12/27/23    Pain  Pain Assessment  Pain Assessment: 0-10  Pain Score: 0 - No pain  Pain Location: Shoulder  Pain Orientation: Left, Anterior  Clinical Progression: Gradually improving  Effect of Pain on Daily Activities:  (Sleep; Reaching; Lifting)    Objective   Exhibits improved functional motion with IR    Treatments:  Therapeutic Exercises (76785): 43 Minutes, 3 Units    Activities:  UBE 3/3, small hills Lv1  supine wand flex, CP, ER : x20 ---(not this visit - HEP)  Supine Punch: (no cane) x20 ---(not this visit - HEP)  Stand wand Ext/IR: x20 ---(not this visit - HEP)  Wall ABC's: 1x  TB Rows: GTB: 3x10  TB LAE: GTB 3x10  TB ER/IR RTB: x20  Jobes: x20 each   Sidelying Ex's (4way): x20 each  B TB ER/HABD :  RTB  2x10 (supine)  Ball on Wall: x10 each (4 way)    Assessment:   Added small hills (Lv1) to UBE this visit with good tolerance.  Able to increase resistance of TB from red to green with rows & IR for further strengthening.  Continued with red TB with ER for better form.  Also able to add TB LAE to the activities performed today.  Reps increased with TB activities as noted.  Frequent vc's " provided for improved form/technique & reducing compensatory movements with fairly good follow through observed.  JUDITH (wand) ex's moved to HEP this visit & added wall ABC's to activities performed for improve motion & capacity for reaching in various directions.  Ball on Wall added to increase GH/scapular strength, stability.  She was appropriately challenged with progressions made this visit.  Will continue to benefit from skilled PT services to further increase GH/scapular strength, stability & improve functional mobility, capacity for daily activities.    Plan:    Continue to progress with POC as tolerated to increase ROM, strength, stability and improve overall functional mobility, endurance & capacity for performing daily activities.     OP EDUCATION:   Move wand activities to HEP.  Will update HEP NV

## 2024-03-25 NOTE — LETTER
March 25, 2024     Patient: Allison Foley   YOB: 1970   Date of Visit: 3/25/2024       To Whom It May Concern:    Allison Foley was seen in my clinic on 3/25/2024 at 7:45 am. Please excuse Allison for her absence from work on this day to make the appointment.    If you have any questions or concerns, please don't hesitate to call.         Sincerely,         Lavern Woodard, PTA        CC: No Recipients

## 2024-03-27 ENCOUNTER — TREATMENT (OUTPATIENT)
Dept: PHYSICAL THERAPY | Facility: CLINIC | Age: 54
End: 2024-03-27
Payer: COMMERCIAL

## 2024-03-27 DIAGNOSIS — M75.122 COMPLETE TEAR OF LEFT ROTATOR CUFF, UNSPECIFIED WHETHER TRAUMATIC: Primary | ICD-10-CM

## 2024-03-27 PROCEDURE — 97110 THERAPEUTIC EXERCISES: CPT | Mod: GP

## 2024-03-27 NOTE — LETTER
March 27, 2024     Patient: Allison Foley   YOB: 1970   Date of Visit: 3/27/2024       To Whom It May Concern:    Allison Foley was seen in my clinic on 3/27/2024 at 7:45 am. Please excuse Allison for her absence from work on this day to make the appointment.    If you have any questions or concerns, please don't hesitate to call.         Sincerely,         Justina Alvarez, PT        CC: No Recipients

## 2024-03-29 ENCOUNTER — OFFICE VISIT (OUTPATIENT)
Dept: ORTHOPEDIC SURGERY | Facility: CLINIC | Age: 54
End: 2024-03-29
Payer: COMMERCIAL

## 2024-03-29 DIAGNOSIS — Z98.890 S/P LEFT ROTATOR CUFF REPAIR: Primary | ICD-10-CM

## 2024-03-29 PROCEDURE — 99213 OFFICE O/P EST LOW 20 MIN: CPT | Performed by: ORTHOPAEDIC SURGERY

## 2024-03-29 PROCEDURE — 1036F TOBACCO NON-USER: CPT | Performed by: ORTHOPAEDIC SURGERY
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
## 2024-04-01 ENCOUNTER — TREATMENT (OUTPATIENT)
Dept: PHYSICAL THERAPY | Facility: CLINIC | Age: 54
End: 2024-04-01
Payer: COMMERCIAL

## 2024-04-01 DIAGNOSIS — M75.122 COMPLETE TEAR OF LEFT ROTATOR CUFF, UNSPECIFIED WHETHER TRAUMATIC: Primary | ICD-10-CM

## 2024-04-01 PROCEDURE — 97110 THERAPEUTIC EXERCISES: CPT | Mod: GP,CQ

## 2024-04-01 ASSESSMENT — PAIN - FUNCTIONAL ASSESSMENT: PAIN_FUNCTIONAL_ASSESSMENT: 0-10

## 2024-04-01 ASSESSMENT — PAIN SCALES - GENERAL: PAINLEVEL_OUTOF10: 0 - NO PAIN

## 2024-04-01 NOTE — LETTER
April 1, 2024     Patient: Allison Foley   YOB: 1970   Date of Visit: 4/1/2024       To Whom It May Concern:    Allison Foley was seen in my clinic on 4/1/2024 at 7:45 am. Please excuse Allison for her absence from work on this day to make the appointment.    If you have any questions or concerns, please don't hesitate to call.         Sincerely,         Lavern Woodard, PTA        CC: No Recipients

## 2024-04-01 NOTE — PROGRESS NOTES
Physical Therapy Treatment    Patient Name: Allison Foley  MRN: [id number]  Today's Date: 4/1/2024  Time Calculation  Start Time: 0746  Stop Time: 0833  Time Calculation (min): 47 min    Insurance  [facility]  Visits Approved: BOA  Visit Number: 9/14     Current Problem  1. Complete tear of left rotator cuff, unspecified whether traumatic        General  Reason for Referral: L RTC repair  Referred By: Gustavo Villegas  General Comment: L RTC repair 12/27/23    Subjective    General   Pt reports good outcome from MD follow up.  States MD is happy with how she is doing but also recommended more PT.  She will return for next follow up in 2 months.  Doing well today.  No new complaints.  No c/o pain.    Precautions   Precautions  Precautions Comment: L RTC repair 12/27/23     No recent falls reported    Pain   Pain Assessment  Pain Assessment: 0-10  Pain Score: 0 - No pain  Pain Location: Shoulder  Pain Orientation: Left, Anterior  Clinical Progression: Gradually improving  Effect of Pain on Daily Activities:  (Sleep; Reaching; Lifting)       Objective   Pt presents with less guarded movements, improved functional mobility.  Still compensates at times.    Treatments:  Therapeutic Exercises (05479): 45 Minutes, 3 Units    Activities:  UBE 3/3, Mobibase Lv1  supine wand flex, CP, ER : x20 ---(not this visit - HEP)  Supine Punch: (no cane) x20 ---(not this visit - HEP)  Stand wand Ext/IR: x20 ---(not this visit - HEP)  Wall ABC's: 1x  TB Rows: GTB: 3x10  TB LAE: GTB 3x10  TB ER/IR RTB: x20  Jobes: x20 each   Sidelying Ex's (4way): x20 each  B TB ER/HABD :  RTB  2x10   Ball on Wall: x20 each (4 way)  Wall slides w/lift every 3rd: 3x10       Assessment:   Pt continues with current activities, as noted, to increase ROM, strength & functional mobility.  She continues to require frequent vc's during activities for improved form/technique & decreasing compensation with fair follow through observed.  Progressions with activities &  adding activities limited secondary to time spent on improving technique/form with current activities.   She was able to add wall slide with lift every 3rd to the activities performed today with some difficulty noted.   Overall, she is making some progress in functional mobility, strength but will continue to benefit from skilled services to further address deficits & return to PLOF with daily activities.    Plan:     Continue to progress with POC as tolerated to increase ROM, strength, stability and improve overall functional mobility, endurance & capacity for performing daily activities.        OP EDUCATION:   Continue with current HEP

## 2024-04-03 ENCOUNTER — TREATMENT (OUTPATIENT)
Dept: PHYSICAL THERAPY | Facility: CLINIC | Age: 54
End: 2024-04-03
Payer: COMMERCIAL

## 2024-04-03 DIAGNOSIS — M75.122 COMPLETE TEAR OF LEFT ROTATOR CUFF, UNSPECIFIED WHETHER TRAUMATIC: Primary | ICD-10-CM

## 2024-04-03 PROCEDURE — 97140 MANUAL THERAPY 1/> REGIONS: CPT | Mod: GP

## 2024-04-03 PROCEDURE — 97110 THERAPEUTIC EXERCISES: CPT | Mod: GP

## 2024-04-03 NOTE — PROGRESS NOTES
Physical Therapy Treatment    Patient Name: Allison Foley  MRN: [id number]  Today's Date: 4/3/2024  Time Calculation  Start Time: 0745  Stop Time: 0827  Time Calculation (min): 42 min    Insurance  [facility]  Visits Approved: BOA  Visit Number: 10/14     Current Problem  1. Complete tear of left rotator cuff, unspecified whether traumatic          General  Reason for Referral: L RTC repair  Referred By: Gustavo Villegas  General Comment: L RTC repair 12/27/23    Subjective    General   Pt states she is doing well today, still having difficulty moving her arm out to the side at this time. She states she has been lifting things at home with her surgical arm ut using her other arm to help.    Precautions   Precautions  Precautions Comment: L RTC repair 12/27/23     No recent falls reported    Pain   Pain Assessment  Pain Assessment: 0-10  Pain Score: 0 - No pain  Pain Location: Shoulder  Pain Orientation: Left, Anterior  Clinical Progression: Gradually improving  Effect of Pain on Daily Activities:  (Sleep; Reaching; Lifting)       Objective   Pt with significant shoulder hiking with any type of flexion.    Treatments:      Activities:  UBE 3/3, small hills Lv1  Wall ABC's: 1x NT  TB Rows: GTB: 3x10  TB LAE: GTB 3x10  TB ER/IR RTB: x20  Jobes: x20 each   Sidelying Ex's (4way): x20 each (Attempted 1# with abduction with poor form)  Pulleys FF/A    Resume NV  B TB ER/HABD :  RTB  2x10 NT  Ball on Wall: x20 each (4 way)NT  Wall slides w/lift every 3rd: 3x10 NT    PROM Flexion/abd x8 minutes      Assessment:   Pt continues to have difficulty with overall ROM and its limited in both passive and active ROM. Returned to manual PROM of shoulder today to increase motion, pt was guarded with this today. Attempted 1# weight with S/L abduction today as pt is now 15 weeks out, but pt continues to have shoulder hiking despite having the strength in this limited range. May hold off increased weight until pt corrects shoulder hiking. Had pt  perform jobes in front of mirror for visual feedback, pt able to correct shoulder hike about 50% of the time. Pt had an instance of muscle spasms in her pectoral area following S/L exercises but she stated no sharp pains. Pt reported no pain following therapy today. Pt instructed to stay compliant with weight restriction at home as she stated she has been doing more with her arm. Pt will continue to benefit form skilled therapy in order to reach her goals.    Plan:     Continue to progress with POC as tolerated to increase ROM, strength, stability and improve overall functional mobility, endurance & capacity for performing daily activities.        OP EDUCATION:   Continue with current HEP

## 2024-04-03 NOTE — LETTER
April 3, 2024     Patient: Allison Foley   YOB: 1970   Date of Visit: 4/3/2024       To Whom It May Concern:    Allison Foley was seen in my clinic on 4/3/2024 at 7:45 am. Please excuse Allison for her absence from work on this day to make the appointment.    If you have any questions or concerns, please don't hesitate to call.         Sincerely,         Justina Alvarez, PT        CC: No Recipients

## 2024-04-09 ENCOUNTER — TREATMENT (OUTPATIENT)
Dept: PHYSICAL THERAPY | Facility: CLINIC | Age: 54
End: 2024-04-09
Payer: COMMERCIAL

## 2024-04-09 DIAGNOSIS — M75.122 COMPLETE TEAR OF LEFT ROTATOR CUFF, UNSPECIFIED WHETHER TRAUMATIC: Primary | ICD-10-CM

## 2024-04-09 PROCEDURE — 97140 MANUAL THERAPY 1/> REGIONS: CPT | Mod: GP,CQ

## 2024-04-09 PROCEDURE — 97110 THERAPEUTIC EXERCISES: CPT | Mod: GP,CQ

## 2024-04-09 ASSESSMENT — PAIN SCALES - GENERAL: PAINLEVEL_OUTOF10: 0 - NO PAIN

## 2024-04-09 ASSESSMENT — PAIN - FUNCTIONAL ASSESSMENT: PAIN_FUNCTIONAL_ASSESSMENT: 0-10

## 2024-04-09 NOTE — PROGRESS NOTES
Physical Therapy Treatment    Patient Name: Allison Foley  MRN: [id number]  Today's Date: 4/9/2024  Time Calculation  Start Time: 0805  Stop Time: 0845  Time Calculation (min): 40 min     PT Therapeutic Procedures Time Entry  Manual Therapy Time Entry: 10  Therapeutic Exercise Time Entry: 30                 Insurance:   [facility]  Visits Approved: BOA  Visit Number: 11/14  Assessment:   Pt cont's to present w/ PROM tightness at all end ranges and weakness through strengthening ex's. Cont'd emphasis on decreasing UT elevation/scapular hiking as she tends to compensate w/ ex, agnes as she fatigues. Cues for pacing and posture w/ overall good f/t. Pt cont's to be motivated to progress her program as allowed.     Plan:   Continue to progress with POC as tolerated to increase ROM, strength, stability and improve overall functional mobility, endurance & capacity for performing daily activities.        Current Problem  1. Complete tear of left rotator cuff, unspecified whether traumatic            Subjective   General  Reason for Referral: (P) L RTC repair  Referred By: Gustavo Villegas (P)  General Comment: (P) L RTC repair 12/27/23Pt states she has been using her TENS unit at home.   Precautions  Precautions  Precautions Comment: (P) L RTC repair 12/27/23    Pain  Pain Assessment: (P) 0-10  Pain Score: (P) 0 - No pain  Clinical Progression: (P) Gradually improving  Effect of Pain on Daily Activities: (P) Sleep, Reaching, Lift    Objective       Treatments:  Therapeutic Exercise (08445):  UBE 3/3, small hills Lv1  Wall ABC's: 1x NT  TB Rows: GTB: 3x10  TB LAE: GTB 3x10  TB ER/IR GTB: x20  Jobes: x20 each   Sidelying Ex's (4way): x20 each (Attempted 1# with abduction with poor form) - NT  Pulleys FF/A 1' ea     Resume NV  B TB ER/HABD :  RTB  2x10 NT  Ball on Wall: x20 each (4 way)NT  Wall slides w/lift every 3rd: 3x10 NT        Manual (71985): PROM Flexion/abd x10 minutes (incl ER and IR today)      EDUCATION:

## 2024-04-09 NOTE — LETTER
April 9, 2024     Patient: Allison Foley   YOB: 1970   Date of Visit: 4/9/2024       To Whom It May Concern:    Allison Foley was seen in my clinic on 4/9/2024 at 8:00 am. Please excuse Allison for her absence from work on this day to make the appointment.    If you have any questions or concerns, please don't hesitate to call.         Sincerely,         Alejandro Rogel, PTA        CC: No Recipients

## 2024-04-15 ENCOUNTER — TREATMENT (OUTPATIENT)
Dept: PHYSICAL THERAPY | Facility: CLINIC | Age: 54
End: 2024-04-15
Payer: COMMERCIAL

## 2024-04-15 DIAGNOSIS — M75.122 COMPLETE TEAR OF LEFT ROTATOR CUFF, UNSPECIFIED WHETHER TRAUMATIC: Primary | ICD-10-CM

## 2024-04-15 PROCEDURE — 97110 THERAPEUTIC EXERCISES: CPT | Mod: GP

## 2024-04-15 PROCEDURE — 97140 MANUAL THERAPY 1/> REGIONS: CPT | Mod: GP

## 2024-04-15 NOTE — LETTER
April 15, 2024     Patient: Allison Foley   YOB: 1970   Date of Visit: 4/15/2024       To Whom It May Concern:    Allison Foley was seen in my clinic on 4/15/2024 at 7:00 am. Please excuse Allison for her absence from work on this day to make the appointment.    If you have any questions or concerns, please don't hesitate to call.         Sincerely,         Justina Alvarez, PT        CC: No Recipients

## 2024-04-15 NOTE — PROGRESS NOTES
Physical Therapy Treatment    Patient Name: Allison Foley  MRN: [id number]  Today's Date: 4/15/2024  Time Calculation  Start Time: 0700  Stop Time: 0744  Time Calculation (min): 44 min     PT Therapeutic Procedures Time Entry  Manual Therapy Time Entry: 10  Therapeutic Exercise Time Entry: 34                 Insurance:   [facility]  Visits Approved: BOA  Visit Number: 13/14  Assessment:   Pt continues to improve but is making small gains at this time. Pt is limited by lack of overall ROM when it comes to strengthening so that has become the focus. Pt educated on not overdoing it at home and to stay within precautions. Pt has crepitus when going out into abduction which seems to be increased d/t weakness at this time. There is no pain associated with this but it does make the pt uncomfortable. Pt abduction most limited motion at this time. Pt will continue to benefit from skilled therapy in order to reach her goals and improve functional mobility.    Plan:  OP PT Plan  Treatment/Interventions: Cryotherapy, Education/ Instruction, Manual therapy, Neuromuscular re-education, Self care/ home management, Therapeutic activities, Therapeutic exercises  PT Plan: Skilled PT  PT Frequency:  (1-2x/week)  Duration: 5 weeks  Number of Treatments Authorized: BOA  Rehab Potential: Good  Plan of Care Agreement: PatientContinue to progress with POC as tolerated to increase ROM, strength, stability and improve overall functional mobility, endurance & capacity for performing daily activities.        Current Problem  1. Complete tear of left rotator cuff, unspecified whether traumatic              Subjective   General  Reason for Referral: L RTC repair  Referred By: Gustavo Villegas  General Comment: L RTC repair 12/27/23Pt states she has been improving but still having trouble lifting her arm without hiking her shoulder  Precautions  Precautions  Precautions Comment: L RTC repair 12/27/23    Pain       Objective   AROM  Shoulder flexion  0-112  Shoulder ER 0-45 at 0 deg abd  Shoulder abd 0-82   HBB PSIS    Quick DASH 61.36    MMT  Flex 4-/5 in available motion  Abd 4-/5 in available motion  ER 3-/5  IR 4/5  Treatments:  Therapeutic Exercise (35880):  Goal review  UBE 3/3, small hills Lv1  Wall slides x15  TB Rows: GTB: 3x10  TB LAE: GTB 3x10  TB ER/IR GTB: x20  Jobes: x20 each   Sidelying Ex's (4way): x20 each NT  Pulleys FF/A 1' ea NT     Resume NV  B TB ER/HABD :  RTB  2x10 NT  Ball on Wall: x20 each (4 way)NT  Wall slides w/lift every 3rd: 3x10 NT        Manual (03402): PROM x10 min all planes      EDUCATION:       Goals:     Demonstrate independence with home exercise program Progressing  Tolerate increased exercise without adverse reaction Progressing  Demonstrate improved posture & proper body mechanics throughout session Progressing  Increase L shoulder ROM to pain free + WFL Progressing  Report decrease in pain by greater than or equal to 2 points to meet the MCID Progressing  Report decrease in Quick DASH by greater than or equal to 10 points to meet the MCID (IE 68.18) Progressing  Perform ADLs without an increase symptoms Progressing  Perform overhead activities without increase in symptoms Progressing  Pt. will be able to sleep through the night without an increase in symptoms Progressing

## 2024-04-22 ENCOUNTER — TREATMENT (OUTPATIENT)
Dept: PHYSICAL THERAPY | Facility: CLINIC | Age: 54
End: 2024-04-22
Payer: COMMERCIAL

## 2024-04-22 DIAGNOSIS — M75.122 COMPLETE TEAR OF LEFT ROTATOR CUFF, UNSPECIFIED WHETHER TRAUMATIC: Primary | ICD-10-CM

## 2024-04-22 PROCEDURE — 97110 THERAPEUTIC EXERCISES: CPT | Mod: GP,CQ

## 2024-04-22 PROCEDURE — 97140 MANUAL THERAPY 1/> REGIONS: CPT | Mod: GP,CQ

## 2024-04-22 ASSESSMENT — PAIN SCALES - GENERAL: PAINLEVEL_OUTOF10: 0 - NO PAIN

## 2024-04-22 ASSESSMENT — PAIN - FUNCTIONAL ASSESSMENT: PAIN_FUNCTIONAL_ASSESSMENT: 0-10

## 2024-04-22 NOTE — LETTER
April 22, 2024     Patient: Allison Foley   YOB: 1970   Date of Visit: 4/22/2024       To Whom It May Concern:    Allison Foley was seen in my clinic on 4/22/2024 at 8:30 am. Please excuse Allison for her absence from work on this day to make the appointment.    If you have any questions or concerns, please don't hesitate to call.         Sincerely,         Alejandro Rogel, PTA        CC: No Recipients
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
## 2024-04-29 ENCOUNTER — TREATMENT (OUTPATIENT)
Dept: PHYSICAL THERAPY | Facility: CLINIC | Age: 54
End: 2024-04-29
Payer: COMMERCIAL

## 2024-04-29 DIAGNOSIS — M75.122 COMPLETE TEAR OF LEFT ROTATOR CUFF, UNSPECIFIED WHETHER TRAUMATIC: Primary | ICD-10-CM

## 2024-04-29 PROCEDURE — 97110 THERAPEUTIC EXERCISES: CPT | Mod: GP

## 2024-04-29 PROCEDURE — 97140 MANUAL THERAPY 1/> REGIONS: CPT | Mod: GP

## 2024-04-29 NOTE — PROGRESS NOTES
Physical Therapy Treatment    Patient Name: Allison Foley  MRN: [id number]  Today's Date: 4/29/2024  Time Calculation  Start Time: 0700  Stop Time: 0742  Time Calculation (min): 42 min     PT Therapeutic Procedures Time Entry  Manual Therapy Time Entry: 12  Therapeutic Exercise Time Entry: 30                 Insurance:   [facility]  Visits Approved: BOA  Visit Number: 14/24 (4/30-6/14/24)  Assessment:   Pt continues to be limited by overall ROM specifically in abd and ER. Pt able to demonstrate improvement today in abd AROM to 0-95 from 0-82 taken 2 weeks ago. Pt fatigued by end of visit but able to demonstrae improvement with jobes overall. Added bent over T for periscapular strengthening with good tolerance but compensation d/t limited ROM.   Pt will continue to benefit from skilled therapy in order to reach her goals and improve functional mobility.     Plan:   Cont to progress ROM and Strengthening as niyah to improve her ability to reach in to cupboards at home and complete ADLs.     Current Problem  1. Complete tear of left rotator cuff, unspecified whether traumatic              Subjective   General   Pt states her pain in her shoulders is better following her fall last week. She states she has a follow up visit with her PCP today and is going to talk to her about her back.  Precautions       Pain       Objective     Shoulder flexion 0-112  Shoulder ER 0-45 at 0 deg abd  Shoulder abd 0-95  HBB PSIS     Quick DASH 61.36     MMT  Flex 4-/5 in available motion  Abd 4-/5 in available motion  ER 3-/5  IR 4/5    Treatments:  Therapeutic Exercise (47049):   UBE 3/3, small hills Lv1  Wall slides x15  TB Rows: GTB: 3x10  TB LAE: GTB 3x10  TB ER/IR GTB: x20  Jobes: x20 each   Pulleys FF/A 1' ea   B TB ER/HABD : RTB  2x10   Sidelying Ex's (4way): x20 each   Bent over T 2x10  Abd foam roller table slide x15    Resume NV  Ball on Wall: x20 each (4 way)NT  Wall slides w/lift every 3rd: 3x10 NT      Manual (81268): PROM x12 min  all planes      EDUCATION:     Goals:     Demonstrate independence with home exercise program Progressing  Tolerate increased exercise without adverse reaction Progressing  Demonstrate improved posture & proper body mechanics throughout session Progressing  Increase L shoulder ROM to pain free + WFL Progressing  Report decrease in pain by greater than or equal to 2 points to meet the MCID Progressing  Report decrease in Quick DASH by greater than or equal to 10 points to meet the MCID (IE 68.18) Progressing  Perform ADLs without an increase symptoms Progressing  Perform overhead activities without increase in symptoms Progressing  Pt. will be able to sleep through the night without an increase in symptoms Progressing

## 2024-05-03 ENCOUNTER — TREATMENT (OUTPATIENT)
Dept: PHYSICAL THERAPY | Facility: CLINIC | Age: 54
End: 2024-05-03
Payer: COMMERCIAL

## 2024-05-03 DIAGNOSIS — M75.122 COMPLETE TEAR OF LEFT ROTATOR CUFF, UNSPECIFIED WHETHER TRAUMATIC: Primary | ICD-10-CM

## 2024-05-03 PROCEDURE — 97110 THERAPEUTIC EXERCISES: CPT | Mod: GP,CQ

## 2024-05-03 ASSESSMENT — PAIN SCALES - GENERAL: PAINLEVEL_OUTOF10: 0 - NO PAIN

## 2024-05-03 ASSESSMENT — PAIN - FUNCTIONAL ASSESSMENT: PAIN_FUNCTIONAL_ASSESSMENT: 0-10

## 2024-05-03 NOTE — LETTER
May 3, 2024     Patient: Allison Foley   YOB: 1970   Date of Visit: 5/3/2024       To Whom It May Concern:    Allison Foley was seen in my clinic on 5/3/2024 at 8:30 am. Please excuse Allison for her absence from work on this day to make the appointment.    If you have any questions or concerns, please don't hesitate to call.         Sincerely,         Lavern Woodard, PTA        CC: No Recipients

## 2024-05-03 NOTE — PROGRESS NOTES
"Physical Therapy Treatment    Patient Name: Allison Foley  MRN: [id number]  Today's Date: 5/3/2024  Time Calculation  Start Time: 0830  Stop Time: 0909  Time Calculation (min): 39 min    Insurance  [facility]  Visits Approved: BOA  Visit Number: 15/24 (4/30-6/14/24)    Current Problem  1. Complete tear of left rotator cuff, unspecified whether traumatic        General  Reason for Referral: L RTC repair  Referred By: Gustavo Villegas  General Comment: L RTC repair 12/27/23    Subjective    General   Pt states she is improving & has no c/o pain.  States \"I need to go back to work.  This is killing me.\"  However, she also states she would \"rather be ready & healthy\" before she returns.  She will return to MD on 5/17/24 for follow up.    Precautions  Precautions  Precautions Comment: L RTC repair 12/27/23    Pain  Pain Assessment  Pain Assessment: 0-10  Pain Score: 0 - No pain  Pain Location: Shoulder  Pain Orientation: Left  Clinical Progression: Gradually improving  Effect of Pain on Daily Activities: Sleep; Reaching; Lifting (improving)    Objective   No abnormalities reported    Treatments:  Therapeutic Exercises (12139): 38 Minutes, 3 Units    Activities:  Therapeutic Exercise (89559):  UBE 3/3, small hills Lv1  SA Wall slides YTB x20  TB Rows: GTB: 3x10  TB LAE: GTB 3x10  TB ER/IR GTB: x20  Jobes: 1# x20 each   Pulleys FF/A 1' ea (not this visit)  B TB ER/HABD : RTB  2x10   Sidelying Ex's (4way): x20 each   Bent over T: 1# 2x10  Abd foam roller table slide x15 (not this visit)  Ball on Wall: x20 each (4 way)  Wall slides w/lift every 3rd: 3x10 (not this visit)  ER stretch at door: 15\" x3      Manual (53880): PROM x12 min all planes  (not today)       Assessment:   Added 1# resistance to JOBES & semi-prone T and YTB to SA wall slides for further GH/scapular strengthening.  She struggles to keep resistance on band with SA wall slide secondary to overall weakness, but does complete.  Good tolerance for 1# resistance with JOBES " & semi-prone T.  No increased c/o pain with added/progressed activities.  Also added ER stretch at door to increase ROM.  Continues to exhibit compensation at times during activities & frequent vc's given to help reduce, improve scapular setting & maintain overall form.  Fair follow through observed to cues provided.  Overall, she tolerated session well & is progressing with POC.  Anticipate she will be able to continue progressing with activities as tolerated.    Plan:      Continue to progress with POC as tolerated to increase ROM, strength, stability and improve overall functional mobility, endurance & capacity for performing daily activities.

## 2024-05-07 ENCOUNTER — TREATMENT (OUTPATIENT)
Dept: PHYSICAL THERAPY | Facility: CLINIC | Age: 54
End: 2024-05-07
Payer: COMMERCIAL

## 2024-05-07 DIAGNOSIS — M75.122 COMPLETE TEAR OF LEFT ROTATOR CUFF, UNSPECIFIED WHETHER TRAUMATIC: Primary | ICD-10-CM

## 2024-05-07 PROCEDURE — 97110 THERAPEUTIC EXERCISES: CPT | Mod: GP

## 2024-05-07 NOTE — PROGRESS NOTES
"Physical Therapy Treatment    Patient Name: Allison Foley  MRN: [id number]  Today's Date: 5/7/2024  Time Calculation  Start Time: 0700  Stop Time: 0743  Time Calculation (min): 43 min    Insurance  [facility]  Visits Approved: BOA  Visit Number: 15/24 (4/30-6/14/24)    Current Problem  1. Complete tear of left rotator cuff, unspecified whether traumatic          General  Reason for Referral: L RTC repair  Referred By: Gustavo Villegas  General Comment: L RTC repair 12/27/23    Subjective    General   Pt states she feels she is progressing and she states she is being consistent with HEP. She reports no pain today.  She will return to MD on 5/17/24 for follow up.    Precautions       Pain   0/10    Objective   No abnormalities reported    Treatments:  Therapeutic Exercises (65883): 38 Minutes, 3 Units    Activities:  Therapeutic Exercise (52738):  UBE 3/3, small hills Lv1  SA Wall slides YTB x20 - did not add to HEP d/t form today  TB Rows: BTB: 3x10  TB LAE: BTB 3x10  TB ER/IR RTB/GTB: x20  Jobes: 1# x20 each - not abd  Pulleys FF/A 1' ea (not this visit)  B TB ER/HABD : RTB  2x10   Sidelying Ex's (4way): x20 each, 1#abd and ER  Bent over T: 1# 2x10 resume NV  Abd foam roller table slide x15   Ball on Wall: x20 each (4 way)  Wall slides w/lift every 3rd: 3x10 (not this visit)  ER stretch at door: 15\" x3      Manual (20175): PROM x12 min all planes  (not today)       Assessment:   Pt tolerated treatment session well today. Pt demonstrated improvement with jobes exercise with less shoulder hike overall. Pt still continues to be limited in abduction. Added foam roller with abd for functional stretch which pt tolerated well. Pt needs verbal cues for proper exercise form. Pt will continue to benefit from skilled therapy in order to reach her goals.    Plan:      Continue to progress with POC as tolerated to increase ROM, strength, stability and improve overall functional mobility, endurance & capacity for performing daily " activities.

## 2024-05-07 NOTE — LETTER
May 7, 2024     Patient: Allison Foley   YOB: 1970   Date of Visit: 5/7/2024       To Whom It May Concern:    Allison Foley was seen in my clinic on 5/7/2024 at 7:00 am. Please excuse Allison for her absence from work on this day to make the appointment.    If you have any questions or concerns, please don't hesitate to call.         Sincerely,         Justina Alvarez, PT        CC: No Recipients

## 2024-05-09 ENCOUNTER — TREATMENT (OUTPATIENT)
Dept: PHYSICAL THERAPY | Facility: CLINIC | Age: 54
End: 2024-05-09
Payer: COMMERCIAL

## 2024-05-09 DIAGNOSIS — M75.122 COMPLETE TEAR OF LEFT ROTATOR CUFF, UNSPECIFIED WHETHER TRAUMATIC: Primary | ICD-10-CM

## 2024-05-09 PROCEDURE — 97110 THERAPEUTIC EXERCISES: CPT | Mod: GP,CQ

## 2024-05-09 ASSESSMENT — PAIN SCALES - GENERAL: PAINLEVEL_OUTOF10: 0 - NO PAIN

## 2024-05-09 ASSESSMENT — PAIN - FUNCTIONAL ASSESSMENT: PAIN_FUNCTIONAL_ASSESSMENT: 0-10

## 2024-05-09 NOTE — PROGRESS NOTES
"Physical Therapy Treatment    Patient Name: Allison Foley  MRN: [id number]  Today's Date: 5/9/2024  Time Calculation  Start Time: 0830  Stop Time: 0916  Time Calculation (min): 46 min     PT Therapeutic Procedures Time Entry  Therapeutic Exercise Time Entry: 44                 Insurance:   [facility]  Visits Approved: BOA  Visit Number: 16/24 (4/30-6/14/24)  Assessment:   1# wt used for all SL ex's today w/ appropriate muscle fatigue observed. Cues for pacing and technique w/ overall good f/t. Pt cont's to present w/ UT elevation and trunk compensation w/ TB ex's, agnes ER however w/ cues has better ability to complete. She will cont to benefit from skilled PT to address her deficits and improve her overall functional ability.     Plan:   MD roman (Dr. Villegas) 5/17. Pt is going to discuss her R shoulder problems w/ the doctor at that time.     Current Problem  1. Complete tear of left rotator cuff, unspecified whether traumatic            Subjective   General  Reason for Referral: L RTC repair  Referred By: Gustavo Villegas  General Comment: L RTC repair 12/27/23 \"I had a rough night.\" Pt states she gets some stiffness in the L shoulder and that is worse \"with the weather.\"   Precautions  Precautions  Precautions Comment: L RTC repair 12/27/23    Pain  Pain Assessment: 0-10  Pain Score: 0 - No pain    Objective       Treatments:  Therapeutic Exercise (55959):  UBE 3/3, small hills Lv1  SA Wall slides YTB x20 - did not add to HEP d/t form today  TB Rows: BTB: 3x10  TB LAE: BTB 3x10  TB ER/IR RTB/GTB: x20  Jobes: 1# x20 each   Pulleys FF/A 1' ea ---  B TB ER/HABD : RTB 2x10 (seated)   Sidelying Ex's (4way): 1# x20 each  Bent over T: 1# 2x10   Abd foam roller table slide x15 --  Ball on Wall: x20 each (4 way)--  Wall slides w/lift every 3rd: 3x10 (not this visit)  ER stretch at door: 15\" x3      Manual (91543): PROM x12 min all planes (not today)     EDUCATION:         "

## 2024-05-09 NOTE — LETTER
May 9, 2024     Patient: Allison Foley   YOB: 1970   Date of Visit: 5/9/2024       To Whom It May Concern:    Allison Foley was seen in my clinic on 5/9/2024 at 8:30 am. Please excuse Allison for her absence from work on this day to make the appointment.    If you have any questions or concerns, please don't hesitate to call.         Sincerely,         Alejandro Rogel, PTA        CC: No Recipients

## 2024-05-13 ENCOUNTER — TREATMENT (OUTPATIENT)
Dept: PHYSICAL THERAPY | Facility: CLINIC | Age: 54
End: 2024-05-13
Payer: COMMERCIAL

## 2024-05-13 DIAGNOSIS — M75.122 COMPLETE TEAR OF LEFT ROTATOR CUFF, UNSPECIFIED WHETHER TRAUMATIC: Primary | ICD-10-CM

## 2024-05-13 PROCEDURE — 97110 THERAPEUTIC EXERCISES: CPT | Mod: GP,CQ

## 2024-05-13 ASSESSMENT — PAIN - FUNCTIONAL ASSESSMENT: PAIN_FUNCTIONAL_ASSESSMENT: 0-10

## 2024-05-13 ASSESSMENT — PAIN SCALES - GENERAL: PAINLEVEL_OUTOF10: 0 - NO PAIN

## 2024-05-13 NOTE — LETTER
May 13, 2024     Patient: Allison Foley   YOB: 1970   Date of Visit: 5/13/2024       To Whom It May Concern:    Allison Foley was seen in my clinic on 5/13/2024 at 7:45 am. Please excuse Allison for her absence from work on this day to make the appointment.    If you have any questions or concerns, please don't hesitate to call.         Sincerely,         Lavern Woodard, PTA        CC:   No Recipients

## 2024-05-13 NOTE — PROGRESS NOTES
"Physical Therapy Treatment    Patient Name: Allison Foley  MRN: [id number]  Today's Date: 5/13/2024  Time Calculation  Start Time: 0745  Stop Time: 0830  Time Calculation (min): 45 min    Insurance  [facility]  Visits Approved: BOA  Visit Number: 17/24 (4/30-6/14/24)    Current Problem  1. Complete tear of left rotator cuff, unspecified whether traumatic        General  Reason for Referral: L RTC repair  Referred By: Gustavo Villegas  General Comment: L RTC repair 12/27/23    Subjective    General  Pt reports her L shoulder is doing well, and she has no current c/o pain.  She will be having a follow up with MD this week & plans to discuss proceeding with surgery on her R shoulder while she is still off work.  She states she will also be scheduling appts this week with another facility to address lymphedema in LE's.     Precautions  Precautions  Precautions Comment: L RTC repair 12/27/23    Pain  Pain Assessment  Pain Assessment: 0-10  Pain Score: 0 - No pain  Pain Location: Shoulder  Pain Orientation: Left  Clinical Progression: Gradually improving  Effect of Pain on Daily Activities:  (Sleep; Reaching; Lifting; Work)    Objective   No c/o numbness, tingling  Compensatory movements observed during activities.    Treatments:  Therapeutic Exercises (14048): 42 Minutes, 3 Units    Activities:  UBE 4/4, Lg Hill Lv1  SA Wall slides YTB x15 - did not add to HEP d/t form today  TB Rows: BTB: 3x10  TB LAE: BTB 3x10  TB ER/IR GTB/BTB: x30  Jobes: 1# x30 each   Pulleys FF/A 1' ea ---(not this visit)  B TB ER/HABD : RTB 2x10 (seated)   Sidelying Ex's (4way): 1# x20 each (not this visit)  Bent over T: 1# 2x10   Abd foam roller table slide x15 --(not this visit)  Ball on Wall: x20 each (4 way)  Wall slides w/lift every 3rd: 3x10 (not this visit)  ER stretch at door: 15\" x3      PROM x12 min all planes (not today)     Assessment:   Pt continues with current activities to increase ROM, strength & functional ability to use L UE with daily " activities.  She exhibits good overall knowledge of the current ex's, but requires frequent vc's for improved form/technique, reducing compensation and pace of activities.  Fair follow through observed to cues provided.  She was able to increased resistance on UBE this visit with good tolerance.  Also increased time for increasing overall endurance.  TB resistance increased with IR/ER as noted.  No other changes this visit to POC.  Normal muscle soreness, fatigue reported with activities given.  Anticipate she will be able to continue with POC & progress activities as tolerated.    Plan:   Continue to progress with POC as tolerated to increase ROM, strength, stability and improve overall functional mobility, endurance & capacity for returning to PLOF with daily/work activities.

## 2024-05-16 ENCOUNTER — TREATMENT (OUTPATIENT)
Dept: PHYSICAL THERAPY | Facility: CLINIC | Age: 54
End: 2024-05-16
Payer: COMMERCIAL

## 2024-05-16 DIAGNOSIS — M75.122 COMPLETE TEAR OF LEFT ROTATOR CUFF, UNSPECIFIED WHETHER TRAUMATIC: Primary | ICD-10-CM

## 2024-05-16 PROCEDURE — 97110 THERAPEUTIC EXERCISES: CPT | Mod: GP,CQ

## 2024-05-16 ASSESSMENT — PAIN SCALES - GENERAL: PAINLEVEL_OUTOF10: 0 - NO PAIN

## 2024-05-16 ASSESSMENT — PAIN - FUNCTIONAL ASSESSMENT: PAIN_FUNCTIONAL_ASSESSMENT: 0-10

## 2024-05-16 NOTE — PROGRESS NOTES
"Physical Therapy Treatment    Patient Name: Allison Foley  MRN: [id number]  Today's Date: 5/16/2024  Time Calculation  Start Time: 0750  Stop Time: 0828  Time Calculation (min): 38 min     PT Therapeutic Procedures Time Entry  Therapeutic Exercise Time Entry: 38                 Insurance:   [facility]  Visits Approved: BOA  Visit Number: 17/24 (4/30-6/14/24)  Assessment:   Cues w/ tband ex's for pacing and form - fair f/t. Resumed wall slides w/ lift every 3rd rep - mod UT compensation observed w/ ex as her L shoulder ROM is still limited and she presents w/ decreased scapulohumeral rhythm. Pt has another appt today (lymphedema) so a few ex's not performed dt time constraints.     Plan:    Continue to progress with POC as tolerated to increase ROM, strength, stability and improve overall functional mobility and endurance.    Current Problem  1. Complete tear of left rotator cuff, unspecified whether traumatic            Subjective   General  Reason for Referral: (P) L RTC repair  Referred By: Gustavo Villegas (P)  General Comment: (P) L RTC repair 12/27/23Pt states no pain in L shoulder. Primary c/o \"stiffness\"  Precautions  Precautions  Precautions Comment: (P) L RTC repair 12/27/23    Pain  Pain Assessment: (P) 0-10  Pain Score: (P) 0 - No pain    Objective       Treatments:  Therapeutic Exercise (60725):  UBE 3/3, Lg Hill Lv1  SA Wall slides YTB x15 - did not add to HEP d/t form today  TB Rows: BTB: 3x10  TB LAE: BTB 3x10  TB ER/IR GTB: x30  Jobes: 1# x30 each   Pulleys FF/A 1' ea ---(not this visit)  B TB ER/HABD : RTB 2x10 (seated)   Sidelying Ex's (4way): 1# x20 each (not this visit)  Bent over T: 1# 2x10 (not this visit)   Abd foam roller table slide x15 --(not this visit)  Ball on Wall: x20 each (4 way)  Wall slides w/lift every 3rd: 3x10  ER stretch at door: 15\" x3       Manual (60206): PROM x12 min all planes (not today)         EDUCATION:         "

## 2024-05-16 NOTE — LETTER
May 16, 2024     Patient: Allison Foley   YOB: 1970   Date of Visit: 5/16/2024       To Whom It May Concern:    Allison Foley was seen in my clinic on 5/16/2024 at 7:45 am. Please excuse Allison for her absence from work on this day to make the appointment.    If you have any questions or concerns, please don't hesitate to call.         Sincerely,         Alejandro Rogel, PTA        CC: No Recipients

## 2024-05-16 NOTE — PROGRESS NOTES
History of Present Illness  No chief complaint on file.      The patient is status post side: left shoulder arthroscopy with a rotator cuff repair repair.  Date of surgery  December 27, 2023  they complain of mild pain.  She also lacks motion.  She has been going to therapy.  They deny any numbness or tingling in the right upper extremity.  She also complains of some right shoulder pain.  She fell 1 month ago, was treated and released from the [facility]'s emergency department.  She had bruising about the shoulder.    Review of Systems   GENERAL: Negative for malaise, significant weight loss, fever  MUSCULOSKELETAL: see HPI  NEURO:  Negative    Exam  Portal sites are  healed .  There is no erythema, warmth or purulent drainage.  They have intact sensation along the lateral border of the upper arm.  They have intact thumb extension, wrist extension, finger flexion.  Range of motion:  120 degrees forward elevation, internal rotation to the back pocket, external rotation 15 degrees  She is able to initiate forward elevation without hiking her shoulder.  Jobes test is negative  On the right she has 140 degrees forward elevation, internal rotation to L5, external rotation is 40 degrees  Jobes test is negative, external rotation test is negative    Imaging:  XR shoulder right 2+ views  Status: Final result     PACS Images - [id number]     Show images for XR shoulder right 2+ views  In Basket Actions    Done  Result Mgmt     View in In Basket  Signed by    Signed Time Phone Pager   Gustavo Villegas MD 5/17/2024 09:08 [phone]      Exam Information    Status Exam Begun Exam Ended   Final 5/17/2024 08:45 5/17/2024 08:53     Study Result    Narrative & Impression   Interpreted By:  Gustavo Villegas,   STUDY:  XR SHOULDER RIGHT 2+ VIEWS; ; 5/17/2024 8:53 am      INDICATION:  Signs/Symptoms:pain.      ACCESSION NUMBER(S):  [id number]      ORDERING CLINICIAN:  GUSTAVO VILLEGAS      FINDINGS:  Right shoulder films are negative for fracture,  dislocation or  destructive lesion. There is moderate degenerative arthrosis of the  acromioclavicular joint. No soft tissue abnormality is identified.          Signed by: Gustavo Villegas 5/17/2024 9:08 AM       Assessment  Status post side: left shoulder arthroscopy with rotator cuff repair  Adhesive capsulitis left shoulder  Right shoulder contusion    Plan  Continue physical therapy for her left shoulder to increase range of motion  Observation of her shoulder contusion  Follow-up in 3 months for recheck of her left shoulder, if she has persistent right shoulder pain she should call or return  All questions were answered

## 2024-05-17 ENCOUNTER — OFFICE VISIT (OUTPATIENT)
Dept: ORTHOPEDIC SURGERY | Facility: CLINIC | Age: 54
End: 2024-05-17
Payer: COMMERCIAL

## 2024-05-17 ENCOUNTER — HOSPITAL ENCOUNTER (OUTPATIENT)
Dept: RADIOLOGY | Facility: CLINIC | Age: 54
Discharge: HOME | End: 2024-05-17
Payer: COMMERCIAL

## 2024-05-17 DIAGNOSIS — S49.91XS RIGHT SHOULDER INJURY, SEQUELA: ICD-10-CM

## 2024-05-17 DIAGNOSIS — Z98.890 S/P LEFT ROTATOR CUFF REPAIR: ICD-10-CM

## 2024-05-17 DIAGNOSIS — M75.02 ADHESIVE CAPSULITIS OF LEFT SHOULDER: ICD-10-CM

## 2024-05-17 PROCEDURE — 73030 X-RAY EXAM OF SHOULDER: CPT | Mod: RIGHT SIDE | Performed by: ORTHOPAEDIC SURGERY

## 2024-05-17 PROCEDURE — 1036F TOBACCO NON-USER: CPT | Performed by: ORTHOPAEDIC SURGERY

## 2024-05-17 PROCEDURE — 99214 OFFICE O/P EST MOD 30 MIN: CPT | Performed by: ORTHOPAEDIC SURGERY

## 2024-05-17 PROCEDURE — 73030 X-RAY EXAM OF SHOULDER: CPT | Mod: RT

## 2024-05-20 ENCOUNTER — TREATMENT (OUTPATIENT)
Dept: PHYSICAL THERAPY | Facility: CLINIC | Age: 54
End: 2024-05-20
Payer: COMMERCIAL

## 2024-05-20 DIAGNOSIS — M75.122 COMPLETE TEAR OF LEFT ROTATOR CUFF, UNSPECIFIED WHETHER TRAUMATIC: Primary | ICD-10-CM

## 2024-05-20 PROCEDURE — 97110 THERAPEUTIC EXERCISES: CPT | Mod: GP,CQ

## 2024-05-20 ASSESSMENT — PAIN - FUNCTIONAL ASSESSMENT: PAIN_FUNCTIONAL_ASSESSMENT: 0-10

## 2024-05-20 ASSESSMENT — PAIN SCALES - GENERAL: PAINLEVEL_OUTOF10: 0 - NO PAIN

## 2024-05-20 NOTE — PROGRESS NOTES
"Physical Therapy Treatment    Patient Name: Allison Foley  MRN: [id number]  Today's Date: 5/20/2024  Time Calculation  Start Time: 0825  Stop Time: 0910  Time Calculation (min): 45 min     PT Therapeutic Procedures Time Entry  Therapeutic Exercise Time Entry: 45                 Insurance:   [facility]  Visits Approved: BOA  Visit Number: 18/24 (4/30-6/14/24)  Assessment:   Increased resistance to Blue TB for IR this visit. Emphasis on pacing and holds w/ fair f/t. Pt needs constant reminders for pacing and arm placement. Resumed SA slides today w/yellow tband w/ improved technique/strength observed. Pt encouraged to cont being mindful of L UT hiking/compensation w/ movement and ex's. She will cont to benefit from skilled PT to address her deficits and improve her overall functional ability.     Plan:   Continue to progress with POC as tolerated to increase ROM, strength, stability and improve overall functional mobility and endurance.     Current Problem  1. Complete tear of left rotator cuff, unspecified whether traumatic            Subjective   General  Reason for Referral: L RTC repair  Referred By: Gustavo Villegas  General Comment: L RTC repair 12/27/23  Precautions  Precautions  Precautions Comment: L RTC repair 12/27/23    Pain  Pain Assessment: 0-10  Pain Score: 0 - No pain    Objective   5/20 AROM L shoulder  Flex 124 deg  Abd 105 deg    Treatments:  Therapeutic Exercise (98358):  UBE 4/4, Lg Hill Lv1  SA Wall slides YTB x10  TB Rows: BTB: 3x10  TB LAE: BTB 3x10  TB ER/IR GTB/BTB: x30  Jobes: 1# x20 each   Pulleys FF/A 1' ea ---(not this visit)  B TB ER/HABD : RTB 2x10 (seated)   Sidelying Ex's (4way): 1# x20 each (not this visit)  Bent over T: 1# 2x10 (not this visit)   Abd foam roller table slide x15 --(not this visit)  Ball on Wall: x20 each (4 way)  Wall slides w/lift every 3rd: 3x10   ER stretch at door: 15\" x3         EDUCATION:  Outpatient Education  Equipment: Thera-Band (Yellow - SA slides)      "

## 2024-05-20 NOTE — LETTER
May 20, 2024     Patient: Allison Foley   YOB: 1970   Date of Visit: 5/20/2024       To Whom It May Concern:    Allison Foley was seen in my clinic on 5/20/2024 at 8:30 am. Please excuse Allison for her absence from work on this day to make the appointment.    If you have any questions or concerns, please don't hesitate to call.         Sincerely,         Alejandro Rogel, PTA        CC: No Recipients

## 2024-05-23 ENCOUNTER — TREATMENT (OUTPATIENT)
Dept: PHYSICAL THERAPY | Facility: CLINIC | Age: 54
End: 2024-05-23
Payer: COMMERCIAL

## 2024-05-23 DIAGNOSIS — M75.122 COMPLETE TEAR OF LEFT ROTATOR CUFF, UNSPECIFIED WHETHER TRAUMATIC: Primary | ICD-10-CM

## 2024-05-23 PROCEDURE — 97110 THERAPEUTIC EXERCISES: CPT | Mod: GP,CQ

## 2024-05-23 ASSESSMENT — PAIN SCALES - GENERAL: PAINLEVEL_OUTOF10: 0 - NO PAIN

## 2024-05-23 ASSESSMENT — PAIN - FUNCTIONAL ASSESSMENT: PAIN_FUNCTIONAL_ASSESSMENT: 0-10

## 2024-05-23 NOTE — PROGRESS NOTES
"Physical Therapy Treatment    Patient Name: Allison Foley  MRN: [id number]  Today's Date: 5/23/2024  Time Calculation  Start Time: 0745  Stop Time: 0829  Time Calculation (min): 44 min     PT Therapeutic Procedures Time Entry  Therapeutic Exercise Time Entry: 44                 Insurance:   [facility]  Visits Approved: BOA  Visit Number: 19/24 (4/30-6/14/24)  Assessment:   Progressed tband resistance to Blue w/ TB IR today. Cont'd emphasis on postural cues and for technique-fair f/t. Added Tband Diagonals (X) this visit w/ emphasis on control and keeping arm straight. Pt able to complete all given ex's w/ appropriate fatigue. She still requires mod cues for pacing and arm placement/position.   Updated pt's HEP this visit w/ pt demo'ing good understanding of. Unsure of pt's compliance w/ HEP at this time.     Plan:     Continue to progress with POC as tolerated to increase ROM, strength, stability and improve overall functional mobility and endurance. Re-check sched 6/3/23 unless appt opens up sooner as pt is concerned of scheduling as she is also doing Lymphedema PT.     Current Problem  1. Complete tear of left rotator cuff, unspecified whether traumatic            Subjective   General  Reason for Referral: (P) L RTC repair  Referred By: Gustavo Villegas (P)  General Comment: (P) L RTC repair 12/27/23Pt states the shoulder is \"feeling ok\" today. No new c/o.   Precautions  Precautions  Precautions Comment: (P) L RTC repair 12/27/23    Pain  Pain Assessment: (P) 0-10  Pain Score: (P) 0 - No pain    Objective       Treatments:  Therapeutic Exercise (05347):  UBE 4'/4', Lv 2.5  SA Wall slides YTB x15   TB Rows: BTB: 3x10  TB LAE: BTB 3x10  TB ER/IR GTB/BTB: x20 ea  Jobes: 1# x30 each   Pulleys FF/A 1' ea ---(not this visit)  B TB ER/HABD : RTB 2x10 (stdg)   Sidelying Ex's (4way): 1# x20 each (not this visit)  Bent over T: 1# 2x10   Abd foam roller table slide x15 --(not this visit)  Ball on Wall: x20 each (4 way)  *Wall slides " "w/lift every 3rd: 3x10  *ER stretch at door: 20\" x3     *TB Diagonals (X) RTB: x10 ea      EDUCATION:  Patient educated on the importance of posture, positioning, body mechanics and healing/recovery time. Also instructed pt on appropriate exercise, staying within pain free range and technique/pacing. Pt demo's good understanding.      Access Code: [email or web address]  URL: [email or web address]  Date: 05/23/2024  Prepared by: Alejandro Rogel    Exercises  - Standing Shoulder Diagonal Horizontal Abduction 60/120 Degrees with Resistance  - 1 x daily - 5-6 x weekly - 1 sets - 10 reps  - Standing Shoulder Diagonal Horizontal Abduction 60/120 Degrees with Resistance (Mirrored)  - 1 x daily - 5-6 x weekly - 1 sets - 10 reps  - Standing Shoulder External Rotation Stretch in Doorway  - 1 x daily - 7 x weekly - 1 sets - 3 reps - 15 second hold  - Shoulder Flexion Wall Slide with Towel  - 1 x daily - 5-6 x weekly - 2 sets - 10 reps      "

## 2024-05-23 NOTE — LETTER
May 23, 2024     Patient: Allison Foley   YOB: 1970   Date of Visit: 5/23/2024       To Whom It May Concern:    Allison Foley was seen in my clinic on 5/23/2024 at 7:45 am. Please excuse Allison for her absence from work on this day to make the appointment.    If you have any questions or concerns, please don't hesitate to call.         Sincerely,         Alejandro Rogel, PTA        CC: No Recipients

## 2024-05-28 ENCOUNTER — TREATMENT (OUTPATIENT)
Dept: PHYSICAL THERAPY | Facility: CLINIC | Age: 54
End: 2024-05-28
Payer: COMMERCIAL

## 2024-05-28 DIAGNOSIS — M75.122 COMPLETE TEAR OF LEFT ROTATOR CUFF, UNSPECIFIED WHETHER TRAUMATIC: Primary | ICD-10-CM

## 2024-05-28 PROCEDURE — 97110 THERAPEUTIC EXERCISES: CPT | Mod: GP,CQ

## 2024-05-28 ASSESSMENT — PAIN SCALES - GENERAL: PAINLEVEL_OUTOF10: 0 - NO PAIN

## 2024-05-28 ASSESSMENT — PAIN - FUNCTIONAL ASSESSMENT: PAIN_FUNCTIONAL_ASSESSMENT: 0-10

## 2024-05-28 NOTE — LETTER
May 28, 2024     Patient: Allison Foley   YOB: 1970   Date of Visit: 5/28/2024       To Whom It May Concern:    Allisno Foley was seen in my clinic on 5/28/2024 at 8:00 am. Please excuse Allison for her absence from work on this day to make the appointment.    If you have any questions or concerns, please don't hesitate to call.         Sincerely,         Alejandro Rogel, PTA        CC: No Recipients

## 2024-05-28 NOTE — PROGRESS NOTES
"Physical Therapy Treatment    Patient Name: Allison Foley  MRN: [id number]  Today's Date: 5/28/2024  Time Calculation  Start Time: 0800  Stop Time: 0843  Time Calculation (min): 43 min     PT Therapeutic Procedures Time Entry  Therapeutic Exercise Time Entry: 43                 Insurance:   [facility]  Visits Approved: BOA  Visit Number: 20/24 (4/30-6/14/24)  Assessment:   Progressed pt's program today w/ addition of TB Step outs (yellow) and Wall ABCs. Pt needs reminders throughout session for posture, pacing and technique. Improving L shoulder ROM and scapulohumeral rhythm observed w/ today's ex's. No pain c/o w/ ex's. Cont'd cues for proper technique/body placement w/ Doorway ER Stretch. She will cont to benefit from skilled PT to address her deficits and improve her overall functional ability.   Plan:   Continue to progress with POC as tolerated to increase ROM, strength, stability and improve overall functional mobility and endurance.     Current Problem  1. Complete tear of left rotator cuff, unspecified whether traumatic            Subjective   General  Reason for Referral: (P) L RTC repair  Referred By: Gustavo Villegas (P)  General Comment: (P) L RTC repair 12/27/23Pt states she has no pain coming in today for her appt.   Precautions  Precautions  Precautions Comment: (P) L RTC repair 12/27/23    Pain  Pain Assessment: (P) 0-10  Pain Score: (P) 0 - No pain    Objective       Treatments:  Therapeutic Exercise (80892):  UBE 4'/4', Lv 2.5  SA Wall slides YTB x15   SA TB Step outs YTB x15  TB Rows: BTB: 3x10  TB LAE: BTB 3x10  TB ER/IR GTB/BTB: x20 ea  Jobes: 1# x30 each   Pulleys FF/A 1' ea ---(not this visit)  B TB ER/HABD : RTB 2x10 (stdg)   Sidelying Ex's (4way): 1# x20 each (not this visit)  Bent over T: 1# 2x10   Abd foam roller table slide x15 --(not this visit)  Ball on Wall: x20 each (4 way)  *Wall slides w/lift every 3rd: 3x10  *ER stretch at door: 20\" x3     *TB Diagonals (X) RTB: x10 ea   Wall ABCs 1x "       EDUCATION:   Patient educated on the importance of posture, positioning, body mechanics and healing/recovery time. Also instructed pt on appropriate exercise, staying within pain free range and technique/pacing. Pt demo's good understanding.

## 2024-05-30 ENCOUNTER — TREATMENT (OUTPATIENT)
Dept: PHYSICAL THERAPY | Facility: CLINIC | Age: 54
End: 2024-05-30
Payer: COMMERCIAL

## 2024-05-30 DIAGNOSIS — M75.122 COMPLETE TEAR OF LEFT ROTATOR CUFF, UNSPECIFIED WHETHER TRAUMATIC: Primary | ICD-10-CM

## 2024-05-30 PROCEDURE — 97110 THERAPEUTIC EXERCISES: CPT | Mod: GP,CQ

## 2024-05-30 ASSESSMENT — PAIN - FUNCTIONAL ASSESSMENT: PAIN_FUNCTIONAL_ASSESSMENT: 0-10

## 2024-05-30 ASSESSMENT — PAIN SCALES - GENERAL: PAINLEVEL_OUTOF10: 0 - NO PAIN

## 2024-05-30 NOTE — PROGRESS NOTES
"Physical Therapy Treatment    Patient Name: Allison Foley  MRN: [id number]  Today's Date: 5/30/2024  Time Calculation  Start Time: 0747  Stop Time: 0830  Time Calculation (min): 43 min     PT Therapeutic Procedures Time Entry  Therapeutic Exercise Time Entry: 43                 Insurance:   [facility]  Visits Approved: BOA  Visit Number: 21/24 (4/30-6/14/24)  Assessment:   Progressed her program this visit w/ addition of tband adduction (Red). Improving strength observed w/ ex's however pt does still need cues for pacing and longer holds. Improved niyah and less cues in general for exercise recall today. She cont's to make nice progress towards her goals.     Plan:    Continue to progress with POC as tolerated to increase ROM, strength, stability and improve overall functional mobility and endurance.     Current Problem  1. Complete tear of left rotator cuff, unspecified whether traumatic            Subjective   General  Reason for Referral: L RTC repair  Referred By: Gustavo Villegas  General Comment: L RTC repair 12/27/23  Precautions  Precautions  Precautions Comment: L RTC repair 12/27/23    Pain  Pain Assessment: 0-10  Pain Score: 0 - No pain    Objective       Treatments:  Therapeutic Exercise (11079):  UBE 4'/4', Lv 2.5  SA Wall slides YTB x15   SA TB Step outs YTB x15  TB Rows: BTB: 3x10  TB LAE: BTB 3x10  TB ER/IR GTB/BTB: x20 ea  TB Add RTB x20   Jobes: 1# x30 each   Pulleys FF/A 1' ea ---(not this visit)  B TB ER/HABD : RTB 2x10 (stdg)  ---  Sidelying Ex's (4way): 1# x20 each (not this visit)  Bent over T: 1# 2x10 ---  Abd foam roller table slide x15 --(not this visit)  Ball on Wall: x20 each (4 way)  *Wall slides w/lift every other: x30  *ER stretch at door: 20\" x3     *TB Diagonals (X) RTB: x10 ea   Wall ABCs 1x       EDUCATION:   Patient educated on the importance of posture, positioning, body mechanics and healing/recovery time. Also instructed pt on appropriate exercise, staying within pain free range and " technique/pacing. Pt demo's good understanding.

## 2024-05-30 NOTE — LETTER
May 30, 2024     Patient: Allison Foley   YOB: 1970   Date of Visit: 5/30/2024       To Whom It May Concern:    Allison Foley was seen in my clinic on 5/30/2024 at 7:45 am. Please excuse Allison for her absence from work on this day to make the appointment.    If you have any questions or concerns, please don't hesitate to call.         Sincerely,         Alejandro Rogel, PTA        CC: No Recipients

## 2024-06-03 ENCOUNTER — TREATMENT (OUTPATIENT)
Dept: PHYSICAL THERAPY | Facility: CLINIC | Age: 54
End: 2024-06-03
Payer: COMMERCIAL

## 2024-06-03 DIAGNOSIS — M75.122 COMPLETE TEAR OF LEFT ROTATOR CUFF, UNSPECIFIED WHETHER TRAUMATIC: Primary | ICD-10-CM

## 2024-06-03 PROCEDURE — 97110 THERAPEUTIC EXERCISES: CPT | Mod: GP

## 2024-06-03 NOTE — LETTER
Trina 3, 2024     Patient: Allison Foley   YOB: 1970   Date of Visit: 6/3/2024       To Whom It May Concern:    Allison Foley was seen in my clinic on 6/3/2024 at 7:00 am. Please excuse Allison for her absence from work on this day to make the appointment.    If you have any questions or concerns, please don't hesitate to call.         Sincerely,         Justina Alvarez, PT        CC: No Recipients

## 2024-06-03 NOTE — PROGRESS NOTES
Physical Therapy Treatment    Patient Name: Allison Foley  MRN: [id number]  Today's Date: 6/3/2024  Time Calculation  Start Time: 0700  Stop Time: 0745  Time Calculation (min): 45 min     PT Therapeutic Procedures Time Entry  Therapeutic Exercise Time Entry: 45                 Insurance:   [facility]  Visits Approved: BOA  Visit Number: 22/24 (4/30-6/14/24)  Assessment:   At this time pt is improving nicely in terms of strength and ROM. Pt has made significant improvements since last motion check but she is still limited in functional ability in regards to ROM, specifically abduction and ER. Pt has improved with minimizing compensations with exercises but she is still limited in functional strength. Pt still has difficulty with reaching overhead or performing self care tasks. Pt HEP reviewed and updated and pt educated on overall importance of keeping up with HEP. Pt verbalized understanding. Focused on ROM in today's visit with improvements following pulleys and wall slides. Pt will continue to benefit from skilled therapy in order to reach her goals.    Plan:  OP PT Plan  Treatment/Interventions: Cryotherapy, Education/ Instruction, Manual therapy, Neuromuscular re-education, Self care/ home management, Therapeutic activities, Therapeutic exercises  PT Plan: Skilled PT  PT Frequency: 1 time per week  Duration: 6 weeks  Number of Treatments Authorized: BOA  Rehab Potential: Good  Plan of Care Agreement: Patient Continue to progress with POC as tolerated to increase ROM, strength, stability and improve overall functional mobility and endurance.     Current Problem  1. Complete tear of left rotator cuff, unspecified whether traumatic              Subjective   General   Pt states her shoulder is feeling like it is improving. She states Dr. Villegas would like her to continue with therapy.  Precautions   L RCR 12/27/24    Pain       Objective   AROM  Shoulder flexion 0-132  Shoulder ER 0-60 at 0 deg abd, 0-30 at 90 deg  "abd  Shoulder abd 0-109  HBB L2    Quick DASH 61.36    MMT  Flex 4/5 in available motion  Abd 4/5 in available motion  ER 4-/5  IR 4+/5    Treatments:  Therapeutic Exercise (25064):  UBE 4'/4', Lv 2.5  Goal review  TB Rows: BTB: 2x10  TB LAE: BTB 2x10  TB ER/IR GTB/BTB: x20 ea  TB Add RTB x20   Jobes: 1# x30 each   Pulleys FF/A x20ea  Wall slides FF/Abd    Resume NV  SA Wall slides YTB x15   SA TB Step outs YTB x15  Abd foam roller table slide x15 --(not this visit)  Ball on Wall: x20 each (4 way)  *Wall slides w/lift every other: x30  *ER stretch at door: 20\" x3     *TB Diagonals (X) RTB: x10 ea   Wall ABCs 1x     B TB ER/HABD : RTB 2x10 (stdg)  ---  Sidelying Ex's (4way): 1# x20 each (not this visit)  Bent over T: 1# 2x10 ---  EDUCATION:   Patient educated on the importance of posture, positioning, body mechanics and healing/recovery time. Also instructed pt on appropriate exercise, staying within pain free range and technique/pacing. Pt demo's good understanding.   Access Code: [email or web address]  URL: [email or web address]  Date: 06/03/2024  Prepared by: Justina Alvarez    Exercises  - Shoulder External Rotation with Anchored Resistance  - 1 x daily - 3-4 x weekly - 3 sets - 10 reps  - Shoulder Internal Rotation with Resistance  - 1 x daily - 3-4 x weekly - 3 sets - 10 reps  - Shoulder Flexion Wall Slide with Towel  - 1 x daily - 7 x weekly - 1-2 sets - 10 reps  - Standing Shoulder Abduction Slides at Wall  - 1 x daily - 7 x weekly - 1-2 sets - 10 reps  - Standing Shoulder Flexion to 90 Degrees  - 1 x daily - 3-4 x weekly - 2 sets - 10 reps  - Shoulder Abduction - Thumbs Up  - 1 x daily - 3-4 x weekly - 2 sets - 10 reps  - Standing Shoulder Scaption  - 1 x daily - 3-4 x weekly - 2 sets - 10 reps  - Standing Shoulder Row with Anchored Resistance  - 1 x daily - 3-4 x weekly - 2 sets - 10 reps  - Shoulder extension with resistance - Neutral  - 1 x daily - 3-4 x weekly - 2 sets - 10 reps  - Doorway Pec " Stretch at 60 Degrees Abduction with Arm Straight  - 1 x daily - 7 x weekly - 2 sets - 30s hold  Goals:     Demonstrate independence with home exercise program Progressing  Tolerate increased exercise without adverse reaction Progressing  Demonstrate improved posture & proper body mechanics throughout session Progressing  Increase L shoulder ROM to pain free + WFL Progressing  Report decrease in pain by greater than or equal to 2 points to meet the MCID Progressing  Report decrease in Quick DASH by greater than or equal to 10 points to meet the MCID (IE 68.18) Progressing  Perform ADLs without an increase symptoms Progressing  Perform overhead activities without increase in symptoms Progressing  Pt. will be able to sleep through the night without an increase in symptoms Progressing

## 2024-06-05 ENCOUNTER — TELEPHONE (OUTPATIENT)
Dept: PHYSICAL THERAPY | Facility: CLINIC | Age: 54
End: 2024-06-05
Payer: COMMERCIAL

## 2024-06-05 NOTE — TELEPHONE ENCOUNTER
Called and spoke with patient to schedule PT follow ups.     She is calling [facility].    AUTH: [facility] APPROVED  6  PT  VISITS  6-4-24 THRU  7-19-24  AUTH#[id number]  [id number]    POC:1 X A WEEK  FOR  6 WEEKS  WITH SANAY PRADO,  AND ELENA YANEZ   6-4-24 THRU  7-19-24

## 2024-06-10 ENCOUNTER — TELEPHONE (OUTPATIENT)
Dept: ORTHOPEDIC SURGERY | Facility: CLINIC | Age: 54
End: 2024-06-10
Payer: COMMERCIAL

## 2024-06-10 NOTE — LETTER
June 11, 2024     Patient: Allison Foley   YOB: 1970   Date of Visit: 6/10/2024       To Whom It May Concern:    It is my medical opinion that Allison Foley may return to work on 06-18-24 working part-time with the restriction of , light duty, , or back cash or drinks only.  She should not be doing any lifting more than 5 lbs, no repetitive pushing, pulling or grasping.  These restrictions are in effect for a period of two months .    If you have any questions or concerns, please don't hesitate to call my office.         Sincerely,        Gustavo Villegas MD    CC: No Recipients

## 2024-06-10 NOTE — TELEPHONE ENCOUNTER
Pt called asking if Dr Villegas would let her go back to work part time, she is still in PT, pt is aware that  Kierra isnt in the office till tomorrow

## 2024-06-11 ENCOUNTER — TREATMENT (OUTPATIENT)
Dept: PHYSICAL THERAPY | Facility: CLINIC | Age: 54
End: 2024-06-11
Payer: COMMERCIAL

## 2024-06-11 PROCEDURE — 97110 THERAPEUTIC EXERCISES: CPT | Mod: GP,CQ

## 2024-06-11 ASSESSMENT — PAIN - FUNCTIONAL ASSESSMENT: PAIN_FUNCTIONAL_ASSESSMENT: 0-10

## 2024-06-11 ASSESSMENT — PAIN SCALES - GENERAL: PAINLEVEL_OUTOF10: 0 - NO PAIN

## 2024-06-11 NOTE — PROGRESS NOTES
"Physical Therapy Treatment    Patient Name: Allison Foley  MRN: [id number]  Today's Date: 6/11/2024  Time Calculation  Start Time: 10:37  Stop Time: 11:20   Time Calculation (min): 43 min     PT Therapeutic Procedures Time Entry  Therapeutic Exercise Time Entry: 43 min        Insurance:   [facility]  Visits Approved: BOA  Visit Number: 22/24 (4/30-6/14/24)      Current Problem  1. Complete tear of left rotator cuff, unspecified whether traumatic            Subjective   General  Reports her shoulder is doing well. \" I want to get back to work\"    Precautions   L RCR 12/27/24    Pain  0/10 resting pain     Objective   AROM  Shoulder flexion 0-135      Treatments:  Therapeutic Exercise (56118):  UBE 4'/4', Lv 2.5  Goal review  TB Rows: BTB: 2x10  TB LAE: BTB 2x10  TB ER/IR GTB/BTB: x20 ea  TB Add RTB x20   Jobes: 1# x30 each   Pulleys FF/A x20ea  Wall slides FF/Abd  SA Wall slides YTB x15     SA TB Step outs YTB x15  Abd foam roller table slide x15 --(not this visit)  Ball on Wall: x20 each (4 way)  *Wall slides w/lift every other: x20  *ER stretch at door: 20\" x3     *TB Diagonals (X) RTB: x10 ea (not this visit)  Wall ABCs 1x    B TB ER/HABD : RTB 2x10 (stdg)    Sidelying Ex's (4way): 1# x20 each  Bent over T: 1# 2x10    Assessment:  Requires cues to avoid substitutions during standing therex. Requires cues to slow pace and allow for full range of motion. Continues to demo increasing AROM. No increase in pain or fatigue following treatment.    Plan:  Continue with shoulder strengthening and postural correction.  "

## 2024-06-12 ENCOUNTER — APPOINTMENT (OUTPATIENT)
Dept: PHYSICAL THERAPY | Facility: CLINIC | Age: 54
End: 2024-06-12
Payer: COMMERCIAL

## 2024-06-17 ENCOUNTER — TREATMENT (OUTPATIENT)
Dept: PHYSICAL THERAPY | Facility: CLINIC | Age: 54
End: 2024-06-17
Payer: COMMERCIAL

## 2024-06-17 DIAGNOSIS — M75.122 COMPLETE TEAR OF LEFT ROTATOR CUFF, UNSPECIFIED WHETHER TRAUMATIC: Primary | ICD-10-CM

## 2024-06-17 PROCEDURE — 97110 THERAPEUTIC EXERCISES: CPT | Mod: GP

## 2024-06-17 NOTE — PROGRESS NOTES
"Physical Therapy Treatment    Patient Name: Allison Foley  MRN: [id number]  06/17/24  [id number]  Therex 40 mins        Insurance:   [facility]  Visits Approved: A  Visit Number: 23 (3/6)  [facility] APPROVED  6  PT  VISITS  6-4-24 THRU  7-19-24    Current Problem  1. Complete tear of left rotator cuff, unspecified whether traumatic            Subjective   General  Pt states she is going back to work part time this week. She states Dr. Villegas gave her a 5# weight restriction.    Precautions   L RCR 12/27/24  5# weight restriction    Pain  0/10 resting pain     Objective   AROM  Shoulder flexion 0-135      Treatments:  Therapeutic Exercise (82245):  UBE 4'/4', Lv 2.5  TB Rows: BTB: 2x10NT  TB LAE: BTB 2x10NT  TB ER/IR GTB/BTB: x20 ea  TB Add RTB x20 NT  Jobes: 2# 2x10 each   Pulleys FF/A x20ea  Ball up wall x15 PB  SA Wall slides YTB x15   Wall clocks x5  Med Ball Series x10 Green Ball  *TB Diagonals (X) RTB: x10 ea       SA TB Step outs YTB x15  Abd foam roller table slide x15 --(not this visit)  Ball on Wall: x20 each (4 way)  *Wall slides w/lift every other: x20  *ER stretch at door: 20\" x3     Wall ABCs 1x    B TB ER/HABD : RTB 2x10 (stdg)    Sidelying Ex's (4way): 1# x20 each  Bent over T: 1# 2x10        Assessment:  Pt tolerated treatment session well today. Added wall clocks for continued strengthening of RTC with appropriate challenge. Added weight to jobes with good tolerance and no pain. Pt form and AROM has continued to improve. Verbal cues for all exercises for exercise form. Pt will continue to benefit from skilled therapy in order to reach her goals.    Plan:  Continue to progress as tolerated with focus on strengthening and ROM  "

## 2024-06-24 NOTE — PROGRESS NOTES
"Physical Therapy Treatment    Patient Name: Allison Foley  MRN: [id number]  Today's Date: 6/25/2024  Time Calculation  Start Time: 0747  Stop Time: 0830  Time Calculation (min): 43 min     PT Therapeutic Procedures Time Entry  Therapeutic Exercise Time Entry: 41                 Current Problem  1. Complete tear of left rotator cuff, unspecified whether traumatic            Insurance:  [facility]  Visits Approved: BOA  Visit Number: 24 (3/6)  [facility] APPROVED  6  PT  VISITS  6-4-24 THRU  7-19-24     Precautions    L RCR 12/27/24  5# weight restriction    Subjective   Subjective:   Pt reports that she went back to work last week . She is currently working part time. 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  Pain   0/10    Objective   AROM    Treatments:  Therapeutic Exercise (46440):  UBE 4'/4', Lv 2.5  TB Rows: BTB: 2x10  TB LAE: BTB 2x10  TB ER/IR GTB/BTB: x20 ea  *TB Diagonals (X) RTB: x15 ea    B TB ER/HABD : RTB 2x10 (stdg)   Standing wand flex 2# 15x  Wall push ups 15x   Jobes: 3# 2x10 each   Wall clocks YTB x5    Ball up wall x15 PB  SA Wall slides YTB x15   Med Ball Series x10 Green Ball  SA TB Step outs YTB x15   Abd foam roller table slide x15 --(not this visit)  Ball on Wall: x20 each (4 way)  *Wall slides w/lift every other: x20  *ER stretch at door: 20\" x3     Wall ABCs 1x    Sidelying Ex's (4way): 1# x20 each  Bent over T: 1# 2x10    Pulleys FF/A x20ea  TB Add RTB x20 NT  OP EDUCATION:   Wall push ups      Assessment:   Pt did well with TB exercises. Pt displayed UT compensation when trying to raise arm up above head. Pt felt more difficulty with IR vs ER. Tried standing wand flex to help decrease UT activation with OH motions. Pt did fairly well with this. Also added wall push ups, which pt didn't have difficulty. Pt challenged with jobes, as she displayed L UT compensation. Fairly good overall completion of ex session    Plan:   Cont with shoulder " strengthening

## 2024-06-25 ENCOUNTER — TREATMENT (OUTPATIENT)
Dept: PHYSICAL THERAPY | Facility: CLINIC | Age: 54
End: 2024-06-25
Payer: COMMERCIAL

## 2024-06-25 DIAGNOSIS — M75.122 COMPLETE TEAR OF LEFT ROTATOR CUFF, UNSPECIFIED WHETHER TRAUMATIC: Primary | ICD-10-CM

## 2024-06-25 PROCEDURE — 97110 THERAPEUTIC EXERCISES: CPT | Mod: GP,CQ

## 2024-06-25 NOTE — LETTER
June 25, 2024     Patient: Allison Foley   YOB: 1970   Date of Visit: 6/25/2024       To Whom It May Concern:    Allison Foley was seen in my clinic on 6/25/2024 at 7:45 am. Please excuse Allison for her absence from work on this day to make the appointment.    If you have any questions or concerns, please don't hesitate to call.         Sincerely,         Ellen Art, PTA        CC:   No Recipients

## 2024-06-27 ENCOUNTER — APPOINTMENT (OUTPATIENT)
Dept: PHYSICAL THERAPY | Facility: CLINIC | Age: 54
End: 2024-06-27
Payer: COMMERCIAL

## 2024-07-01 ENCOUNTER — TREATMENT (OUTPATIENT)
Dept: PHYSICAL THERAPY | Facility: CLINIC | Age: 54
End: 2024-07-01
Payer: COMMERCIAL

## 2024-07-01 DIAGNOSIS — M75.122 COMPLETE TEAR OF LEFT ROTATOR CUFF, UNSPECIFIED WHETHER TRAUMATIC: Primary | ICD-10-CM

## 2024-07-01 PROCEDURE — 97110 THERAPEUTIC EXERCISES: CPT | Mod: GP

## 2024-07-01 NOTE — LETTER
July 1, 2024     Patient: Allison Foley   YOB: 1970   Date of Visit: 7/1/2024       To Whom It May Concern:    Allison Foley was seen in my clinic on 7/1/2024 at 7:45 am. Please excuse Allison for her absence from work on this day to make the appointment.    If you have any questions or concerns, please don't hesitate to call.         Sincerely,         Justina Alvarez, PT        CC: No Recipients

## 2024-07-01 NOTE — PROGRESS NOTES
"Physical Therapy Treatment    Patient Name: Allison Foley  MRN: [id number]  Today's Date: 7/1/2024  Time Calculation  Start Time: 0745  Stop Time: 0827  Time Calculation (min): 42 min     PT Therapeutic Procedures Time Entry  Therapeutic Exercise Time Entry: 42                 Current Problem  1. Complete tear of left rotator cuff, unspecified whether traumatic              Insurance:  [facility]  Visits Approved: BOA  Visit Number: 24 (4/6)  [facility] APPROVED  6  PT  VISITS  6-4-24 THRU  7-19-24     Precautions    L RCR 12/27/24  5# weight restriction    Subjective   Subjective:   Pt states she is doing well and is back to work at this time. 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  Pain   0/10    Objective   AROM    Treatments:  Therapeutic Exercise (99668):  UBE 4'/4', Lv 2.5  TB Rows: BTB: 2x10  TB LAE: BTB 2x10  TB ER/IR GTB/BTB: x20 ea  *TB Diagonals (X) RTB: x15 ea    B TB ER/HABD : RTB 2x10 (stdg)   Standing wand flex 2# 2x10  Wall push ups 15x resume NV  Jobes: 3# 2x10 each   Wall clocks YTB x5  Med Ball Series x10 Green Ball  SA TB Step outs YTB x15    Ball up wall x15 PB  SA Wall slides YTB x15 `   Abd foam roller table slide x15 --(not this visit)  Ball on Wall: x20 each (4 way)  *Wall slides w/lift every other: x20  *ER stretch at door: 20\" x3     Wall ABCs 1x    Sidelying Ex's (4way): 1# x20 each  Bent over T: 1# 2x10    Pulleys FF/A x20ea  TB Add RTB x20 NT  OP EDUCATION:  HEP updated with current exercises, frequency discussed     Access Code: [email or web address]  URL: [email or web address]  Date: 07/01/2024  Prepared by: Justina Alvarez    Exercises  - Shoulder External Rotation with Anchored Resistance  - 1 x daily - 3-4 x weekly - 2 sets - 10 reps  - Shoulder Internal Rotation with Resistance  - 1 x daily - 3-4 x weekly - 2 sets - 10 reps  - Standing Shoulder Row with Anchored Resistance  - 1 x daily - 3-4 x weekly - 2 sets - 10 reps  - " Shoulder extension with resistance - Neutral  - 1 x daily - 3-4 x weekly - 2 sets - 10 reps  - Standing Shoulder Diagonal Horizontal Abduction 60/120 Degrees with Resistance  - 1 x daily - 3-4 x weekly - 2 sets - 10 reps  - Standing Shoulder Horizontal Abduction with Resistance  - 1 x daily - 3-4 x weekly - 2 sets - 10 reps  - Shoulder Abduction with Dumbbells - Thumbs Up  - 1 x daily - 3-4 x weekly - 2 sets - 10 reps  - Scaption with Dumbbells  - 1 x daily - 3-4 x weekly - 2 sets - 10 reps  - Standing Shoulder Flexion to 90 Degrees with Dumbbells  - 1 x daily - 3-4 x weekly - 2 sets - 10 reps  - Scapular Wall Slides  - 1 x daily - 3-4 x weekly - 2 sets - 10 reps      Assessment:   Pt tolerated treatment session well today. Able to progress resistance of several exercises with no increase in pain and good form. Reviewed HEP and educated pt on frequency, pt verbalized understanding. Educated pt on how HEP is personalized to her recovery. Pt strength is progressing nicely and compensations have decreased over last few sessions. Verbal and tactile cues needed for proper exercise form. Pt will continue to benefit from skilled therapy in order to reach her goals.    Plan:   Continue to progress as tolerated with focus on strengthening, ROM, functional mobility

## 2024-07-09 ENCOUNTER — TREATMENT (OUTPATIENT)
Dept: PHYSICAL THERAPY | Facility: CLINIC | Age: 54
End: 2024-07-09
Payer: COMMERCIAL

## 2024-07-09 DIAGNOSIS — M75.122 COMPLETE TEAR OF LEFT ROTATOR CUFF, UNSPECIFIED WHETHER TRAUMATIC: Primary | ICD-10-CM

## 2024-07-09 PROCEDURE — 97110 THERAPEUTIC EXERCISES: CPT | Mod: GP

## 2024-07-09 NOTE — PROGRESS NOTES
Physical Therapy Treatment    Patient Name: Allison Foley  MRN: [id number]  Today's Date: 7/9/2024  Time Calculation  Start Time: 0745  Stop Time: 0824  Time Calculation (min): 39 min     PT Therapeutic Procedures Time Entry  Therapeutic Exercise Time Entry: 39                 Current Problem  1. Complete tear of left rotator cuff, unspecified whether traumatic                Insurance:  [facility]  Visits Approved: BOA  Visit Number: 24 (5/6)  [facility]  [facility]  VISITS  6-4-24 THRU  7-19-24     Precautions    L RCR 12/27/24  5# weight restriction    Subjective   Subjective:   Pt reports she is back to work and feels she is doing pretty well overall. She states she thinks she is ready to continue on with HEP  Pain   0/10    Objective   AROM  ROM  Shoulder flexion 0-140  Shoulder ER 0-60 at 0 deg abd, 0-55 at 90 deg abd  Shoulder abd 0-118  HBB L2    Quick DASH 22.73    MMT  Flex 4+/5 in available motion  Abd 4+/5 in available motion  ER 4+/5  IR 5/5  Treatments:  Goal review  UBE 3'/3',   TB Rows: BTB: 2x10  TB LAE: BTB 2x10  TB ER/IR GTB/BTB: x20 ea  *TB Diagonals (X) RTB: x15 ea    B TB ER/HABD : RTB 2x10 (stdg)   Wall push ups 15x   Jobes: 3# 2x10 each   Wall clocks YTB 2x5  Med Ball Series x10 Green Ball  SA TB Step outs YTB x15      OP EDUCATION:  HEP updated with current exercises, frequency discussed           Assessment:   Pt has made significant progress on all of her goals at this time. Pt has demonstrated improvements in ROM and strength and is now able to perform functional movements including returning to work part time. After discussion with pt for hold vs discharge, pt will be placed on 30 day hold to work on HEP and follow up with Dr. Villegas. Pt has good understanding of HEP which was reviewed today. Pt understands where her limitations are and what to work on at home. Pt will be placed on 30 day hold with HEP. Pt agreeable to this plan.    Plan:   Pt to be placed on 30 day hold with HEP.  Encouraged to call with questions or concerns        Goals:     Demonstrate independence with home exercise program Met  Tolerate increased exercise without adverse reaction Met  Demonstrate improved posture & proper body mechanics throughout session Met  Increase L shoulder ROM to pain free + WFL Met  Report decrease in pain by greater than or equal to 2 points to meet the MCID Met  Report decrease in Quick DASH by greater than or equal to 10 points to meet the MCID (IE 22.73) Met  Perform ADLs without an increase symptoms Met  Perform overhead activities without increase in symptoms Met  Pt. will be able to sleep through the night without an increase in symptoms Met

## 2024-07-09 NOTE — LETTER
July 9, 2024     Patient: Allison Foley   YOB: 1970   Date of Visit: 7/9/2024       To Whom It May Concern:    Allison Foley was seen in my clinic on 7/9/2024 at 7:45 am. Please excuse Allison for her absence from work on this day to make the appointment.    If you have any questions or concerns, please don't hesitate to call.         Sincerely,         Justina Alvarez, PT        CC: No Recipients

## 2024-07-17 NOTE — PROGRESS NOTES
History of Present Illness  No chief complaint on file.      The patient is status post side: left shoulder arthroscopy with a rotator cuff repair repair.  Date of surgery  December 27, 2023  they complain of  no  pain.  They deny any numbness or tingling in the right upper extremity.    Review of Systems   GENERAL: Negative for malaise, significant weight loss, fever  MUSCULOSKELETAL: see HPI  NEURO:  Negative    Exam  Portal sites are  healed .  There is no erythema, warmth or purulent drainage.  They have intact sensation along the lateral border of the upper arm.  They have intact thumb extension, wrist extension, finger flexion.  Range of motion: Forward flexion to 145 degrees, internal rotation to L1 external rotation to 80 degrees abduction to 90 degrees with shoulder hike  No pain or weakness with stressing the cuff    Assessment  Status post side: left shoulder arthroscopy with rotator cuff repair    Plan  Continue home exercise program for another month since she has plateaued in physical therapy.  She is found to work around where she opens a box and takes out a little bit at a time without actually lifting the entire box.  Follow-up in 1 month for recheck and anticipate return to full duty  Continue work restrictions for now  All questions were answered.

## 2024-07-18 ENCOUNTER — OFFICE VISIT (OUTPATIENT)
Dept: ORTHOPEDIC SURGERY | Facility: CLINIC | Age: 54
End: 2024-07-18
Payer: COMMERCIAL

## 2024-07-18 DIAGNOSIS — Z98.890 S/P LEFT ROTATOR CUFF REPAIR: Primary | ICD-10-CM

## 2024-07-18 PROCEDURE — 99213 OFFICE O/P EST LOW 20 MIN: CPT | Performed by: ORTHOPAEDIC SURGERY

## 2024-07-18 NOTE — LETTER
July 18, 2024     Patient: Allison Foley   YOB: 1970   Date of Visit: 7/18/2024       To Whom It May Concern:    Allison Foley was seen in my clinic on 7/18/2024 at 8:00 am. Please excuse Allison for her absence from work on this day to make the appointment.  She is to continue her current work restrictions for a period of one month.    If you have any questions or concerns, please don't hesitate to call.         Sincerely,         Gustavo Villegas MD        CC: No Recipients

## 2024-07-18 NOTE — Clinical Note
July 18, 2024     Patient: Allison Foley   YOB: 1970   Date of Visit: 7/18/2024       To Whom It May Concern:    It is my medical opinion that Allison Foley {Work release (duty restriction):88932}.    If you have any questions or concerns, please don't hesitate to call.         Sincerely,        Gustavo Villegas MD    CC: No Recipients

## 2024-08-22 ENCOUNTER — OFFICE VISIT (OUTPATIENT)
Dept: ORTHOPEDIC SURGERY | Facility: CLINIC | Age: 54
End: 2024-08-22
Payer: COMMERCIAL

## 2024-08-22 DIAGNOSIS — Z98.890 S/P LEFT ROTATOR CUFF REPAIR: Primary | ICD-10-CM

## 2024-08-22 PROCEDURE — 99213 OFFICE O/P EST LOW 20 MIN: CPT | Performed by: ORTHOPAEDIC SURGERY

## 2024-08-22 PROCEDURE — 1036F TOBACCO NON-USER: CPT | Performed by: ORTHOPAEDIC SURGERY

## 2024-08-22 NOTE — PROGRESS NOTES
History of Present Illness  No chief complaint on file.      The patient is status post side: left shoulder arthroscopy with a rotator cuff repair repair.  Date of surgery December 27, 2023  they complain of  no  pain.  They deny any numbness or tingling in the right upper extremity.  She notes improvements in her strength and range of motion.  She states she is still has difficulty lifting heavy boxes.    Review of Systems   GENERAL: Negative for malaise, significant weight loss, fever  MUSCULOSKELETAL: see HPI  NEURO:  Negative    Exam  Portal sites are healing well.  There is no erythema, warmth or purulent drainage.  They have intact sensation along the lateral border of the upper arm.  They have intact thumb extension, wrist extension, finger flexion.  Range of motion: Forward flexion 180 degrees internal rotation to L1 external rotation 90 degrees abduction 90 degrees  No pain with stressing the cuff    Assessment  Status post side: left shoulder arthroscopy with rotator cuff repair    Plan  Return to work full duty on Monday, 8/26/2024  Follow-up as needed  All questions were answered.

## 2024-08-22 NOTE — LETTER
August 22, 2024     Patient: Allison Foley   YOB: 1970   Date of Visit: 8/22/2024       To Whom it May Concern:    Allison Foley was seen in my clinic on 8/22/2024.     If you have any questions or concerns, please don't hesitate to call.         Sincerely,          Gustavo Villegas MD        CC: No Recipients

## 2024-08-22 NOTE — LETTER
August 22, 2024     Patient: Allison Foley   YOB: 1970   Date of Visit: 8/22/2024       To Whom it May Concern:    Allison Foley was seen in my clinic on 8/22/2024. She  may return to work with no restrictions on 8/26/24 .    If you have any questions or concerns, please don't hesitate to call.         Sincerely,          Gustavo Villegas MD        CC: No Recipients

## 2024-10-29 ENCOUNTER — HOSPITAL ENCOUNTER (OUTPATIENT)
Dept: RADIOLOGY | Facility: CLINIC | Age: 54
Discharge: HOME | End: 2024-10-29
Payer: COMMERCIAL

## 2024-10-29 ENCOUNTER — OFFICE VISIT (OUTPATIENT)
Dept: ORTHOPEDIC SURGERY | Facility: CLINIC | Age: 54
End: 2024-10-29
Payer: COMMERCIAL

## 2024-10-29 DIAGNOSIS — M76.32 ILIOTIBIAL BAND TENDONITIS OF LEFT SIDE: ICD-10-CM

## 2024-10-29 DIAGNOSIS — M25.562 LEFT KNEE PAIN, UNSPECIFIED CHRONICITY: ICD-10-CM

## 2024-10-29 DIAGNOSIS — M22.2X2 PATELLOFEMORAL SYNDROME OF LEFT KNEE: Primary | ICD-10-CM

## 2024-10-29 PROCEDURE — 73564 X-RAY EXAM KNEE 4 OR MORE: CPT | Mod: LEFT SIDE | Performed by: ORTHOPAEDIC SURGERY

## 2024-10-29 PROCEDURE — 99213 OFFICE O/P EST LOW 20 MIN: CPT | Performed by: ORTHOPAEDIC SURGERY

## 2024-10-29 PROCEDURE — L1812 KO ELASTIC W/JOINTS PRE OTS: HCPCS | Performed by: ORTHOPAEDIC SURGERY

## 2024-10-29 PROCEDURE — 1036F TOBACCO NON-USER: CPT | Performed by: ORTHOPAEDIC SURGERY

## 2024-10-29 PROCEDURE — 73564 X-RAY EXAM KNEE 4 OR MORE: CPT | Mod: LT

## 2024-11-12 ENCOUNTER — EVALUATION (OUTPATIENT)
Dept: PHYSICAL THERAPY | Facility: HOSPITAL | Age: 54
End: 2024-11-12
Payer: COMMERCIAL

## 2024-11-12 DIAGNOSIS — M22.2X2 PATELLOFEMORAL SYNDROME OF LEFT KNEE: Primary | ICD-10-CM

## 2024-11-12 DIAGNOSIS — M22.2X2 PATELLOFEMORAL PAIN SYNDROME OF LEFT KNEE: ICD-10-CM

## 2024-11-12 DIAGNOSIS — G89.29 CHRONIC PAIN OF LEFT KNEE: ICD-10-CM

## 2024-11-12 DIAGNOSIS — M76.32 ILIOTIBIAL BAND TENDINITIS OF LEFT SIDE: ICD-10-CM

## 2024-11-12 DIAGNOSIS — M25.562 CHRONIC PAIN OF LEFT KNEE: ICD-10-CM

## 2024-11-12 DIAGNOSIS — M76.32 ILIOTIBIAL BAND TENDONITIS OF LEFT SIDE: ICD-10-CM

## 2024-11-12 PROCEDURE — 97140 MANUAL THERAPY 1/> REGIONS: CPT | Mod: GP | Performed by: PHYSICAL THERAPIST

## 2024-11-12 PROCEDURE — 97161 PT EVAL LOW COMPLEX 20 MIN: CPT | Mod: GP | Performed by: PHYSICAL THERAPIST

## 2024-11-12 ASSESSMENT — PAIN - FUNCTIONAL ASSESSMENT: PAIN_FUNCTIONAL_ASSESSMENT: 0-10

## 2024-11-12 ASSESSMENT — PAIN SCALES - GENERAL: PAINLEVEL_OUTOF10: 1

## 2024-11-12 NOTE — PROGRESS NOTES
Physical Therapy    Physical Therapy Evaluation and Treatment      Patient Name: Allison Foley  MRN: [id number]  Today's Date: 11/12/2024    Time Entry:   Time Calculation  Start Time: 1015  Stop Time: 1045  Time Calculation (min): 30 min  PT Evaluation Time Entry  PT Evaluation (Low) Time Entry: 15  PT Therapeutic Procedures Time Entry  Manual Therapy Time Entry: 10  Therapeutic Exercise Time Entry: 5                   Assessment:  This 53 yo pleasant female is present today with the diagnosis of Lt knee pain, patellofemoral syndrome of Lt knee and Iliotibial band tendonitis of left side.  She had (B) TKR's, Lt 2015, Rt 2010 and Lt RCR 12/27/2023.  She has a history of lumbar pain, no surgery.  She reports her knee popped and gave out on 10/11/24 at home, swelled up and could minimally weight bear through her leg.  She states she's much better but still getting some pain on the outside of her leg from her hip to knee.   Pt walking into the clinic with normal gait pattern.  She has some mild (B) LE lymphedema.  Her Lt knee AROM to good and some pain noted with flexion.  Mild Baker's cyst.  Tightness to her Lt IT band.   She reports increased pain with weight bearing activity.  Squatting produces some increased pain.  Her job requires 100% standing and walking and she wants to RTW as soon as possible.  Pt given IT band and initiated IASTM and cupping today.  Her Lt knee AROM pain free after treatment and no pain with squatting.  Pt given HEP.        Plan:  Continue with skilled PT      Current Problem:   1. Patellofemoral syndrome of left knee  Referral to Physical Therapy    Follow Up In Physical Therapy      2. Iliotibial band tendonitis of left side  Referral to Physical Therapy    Follow Up In Physical Therapy      3. Chronic pain of left knee  Follow Up In Physical Therapy      4. Patellofemoral pain syndrome of left knee  Follow Up In Physical Therapy      5. Iliotibial band tendinitis of left side  Follow Up In  Physical Therapy          Subjective  Pt reports her Lt knee is feeling better and wants to get back to work soon.      Pain:  Pain Assessment  Pain Assessment: 0-10  0-10 (Numeric) Pain Score: 1  Pain Type: Chronic pain  Pain Location: Knee  Pain Orientation: Left    Objective     AROM:  Lt knee 0 to 120 degrees of flexion      Strength:  5/5 Lt knee strength with MMT      Posture:  Mild (B) LE edema      Palpation:  Tenderness to mid to distal IT band      Functional Mobility:  Independent with transfers      Balance:  Good standing and walking balance      Special Tests:  Positive Daniel test    Outcome Measures:  Other Measures  Lower Extremity Funtional Score (LEFS): 36/80     Treatments:    IT band stretching, 30 sec hold, 5 reps     IASTM and cupping to Lt IT band        Goals:    No Lt knee pain.  No tenderness to her Lt IT Band.    Unlimited standing and walking tolerances.  No pain with squatting.    Independent with HEP

## 2024-11-15 ENCOUNTER — TELEPHONE (OUTPATIENT)
Dept: PHYSICAL THERAPY | Facility: HOSPITAL | Age: 54
End: 2024-11-15
Payer: COMMERCIAL

## 2024-11-15 DIAGNOSIS — M75.122 COMPLETE TEAR OF LEFT ROTATOR CUFF, UNSPECIFIED WHETHER TRAUMATIC: Primary | ICD-10-CM

## 2024-11-15 NOTE — TELEPHONE ENCOUNTER
[facility] APPT CANC NOTICE FOR APPT SCHD 11/18/24. NO FURTHER ACTION REQUIRED PT IS SCHED TO SEE PROVIDER-12/04/24

## 2024-11-18 ENCOUNTER — APPOINTMENT (OUTPATIENT)
Dept: PHYSICAL THERAPY | Facility: HOSPITAL | Age: 54
End: 2024-11-18
Payer: COMMERCIAL

## 2024-11-18 DIAGNOSIS — M75.122 COMPLETE TEAR OF LEFT ROTATOR CUFF, UNSPECIFIED WHETHER TRAUMATIC: Primary | ICD-10-CM

## 2024-12-03 ENCOUNTER — OFFICE VISIT (OUTPATIENT)
Dept: ORTHOPEDIC SURGERY | Facility: CLINIC | Age: 54
End: 2024-12-03
Payer: COMMERCIAL

## 2024-12-03 DIAGNOSIS — M22.2X2 PATELLOFEMORAL SYNDROME OF LEFT KNEE: Primary | ICD-10-CM

## 2024-12-03 PROCEDURE — 99213 OFFICE O/P EST LOW 20 MIN: CPT | Performed by: ORTHOPAEDIC SURGERY

## 2024-12-03 PROCEDURE — 1036F TOBACCO NON-USER: CPT | Performed by: ORTHOPAEDIC SURGERY

## 2024-12-03 NOTE — LETTER
[facility]  [address] [place] [id number]  PHONE: [phone]  FAX: [phone]      December 3, 2024    Patient: Allison Foley   YOB: 1970   Date of Visit: 12/3/2024       To Whom It May Concern,    Allison Foley was seen in my clinic on 12/3/2024, please excuse Allison from any missed work to make this appointment today.    If you have any questions or concerns, please contact the office by phone or fax.  Phone: [phone] or Fax: [phone]        Sincerely,      Asaf Bellamy MD

## 2024-12-04 ENCOUNTER — TELEPHONE (OUTPATIENT)
Dept: PHYSICAL THERAPY | Facility: HOSPITAL | Age: 54
End: 2024-12-04
Payer: COMMERCIAL

## 2024-12-04 ENCOUNTER — TREATMENT (OUTPATIENT)
Dept: PHYSICAL THERAPY | Facility: HOSPITAL | Age: 54
End: 2024-12-04
Payer: COMMERCIAL

## 2024-12-04 DIAGNOSIS — M76.32 ILIOTIBIAL BAND TENDINITIS OF LEFT SIDE: Primary | ICD-10-CM

## 2024-12-04 DIAGNOSIS — M22.2X2 PATELLOFEMORAL PAIN SYNDROME OF LEFT KNEE: ICD-10-CM

## 2024-12-04 DIAGNOSIS — M75.122 COMPLETE TEAR OF LEFT ROTATOR CUFF, UNSPECIFIED WHETHER TRAUMATIC: Primary | ICD-10-CM

## 2024-12-04 PROCEDURE — 97110 THERAPEUTIC EXERCISES: CPT | Mod: GP | Performed by: PHYSICAL THERAPIST

## 2024-12-04 ASSESSMENT — PAIN SCALES - GENERAL: PAINLEVEL_OUTOF10: 8

## 2024-12-04 ASSESSMENT — PAIN - FUNCTIONAL ASSESSMENT: PAIN_FUNCTIONAL_ASSESSMENT: 0-10

## 2024-12-04 NOTE — PROGRESS NOTES
Physical Therapy    Physical Therapy Treatment    Patient Name: Allison Foley  MRN: [id number]  Today's Date: 12/4/2024    Time Entry:   Time Calculation  Start Time: 1010  Stop Time: 1050  Time Calculation (min): 40 min     PT Therapeutic Procedures Time Entry  Therapeutic Exercise Time Entry: 40                   Assessment:  Her Lt thigh and knee is worse.  She reports pain in all positions, laying down, sitting, standing and walking and squatting.  She has a history of LBP but no pain at the current time.  Extension force to lumbar spine abolished her Lt thigh and knee pain today.  Pt given HEP/PREP, ADL modification and postural alignment.        Plan:  Continue with skilled PT      Current Problem  1. Iliotibial band tendinitis of left side        2. Patellofemoral pain syndrome of left knee            Subjective  Pt states her Lt thigh to knee pain is worse and she hasn't went back to work yet.      Pain  Pain Assessment  Pain Assessment: 0-10  0-10 (Numeric) Pain Score: 8  Pain Type: Chronic pain  Pain Location: Knee  Pain Orientation: Left    Objective     Lumbar AROM WFL's noting some pain with flexion.      Treatments:    IT band stretching, 30 sec hold, 5 reps      IASTM and cupping to Lt IT band    Prone propping, 3 minutes *  Prone press ups, 10 reps x 3 *  Sciatic nerve floss, 30 reps *  SLR's, supine and prone, 10 reps x 2*  Sitting with lumbar lordosis/roll *  NuStep, L3, 10 minutes      Goals:    No Lt knee pain.  No tenderness to her Lt IT Band.    Unlimited standing and walking tolerances.  No pain with squatting.    Independent with HEP

## 2024-12-04 NOTE — LETTER
December 4, 2024     Patient: Allison Foley   YOB: 1970   Date of Visit: 12/4/2024       To Whom It May Concern:    Allison Foley was seen in my clinic on 12/4/2024 at 10:15am. If you have any questions or concerns, please don't hesitate to call.         Sincerely,         Dandy Haywood, PT        CC: No Recipients

## 2024-12-04 NOTE — PROGRESS NOTES
History of present illness    54-year-old female follow-up of her left knee pain she states that she is only had 1 visit of physical therapy so far the brace seems to help no numbness or tingling no fevers or chills she has lymphedema and she states that when she uses that it affects her brace and so she was questioning whether or not she should use the brace or the lymphedema pump      Past medical , Surgical, Family and social history reviewed.      Physical exam  General: No acute distress and breathing comfortably.  Patient is pleasant and cooperative with the examination.    Extremity  Good range of motion of her left knee minimal tenderness along the medial side no instability varus valgus brisk cap refill compartments soft calf is nontender    Diagnostics      No results found.     Procedure  [ none]    Assessment  Resolving strain sprain of the left knee    Treatment plan  1.  Continue with a knee brace hold off on the lymphedema pump continue with her physical therapy follow-up 1 month.  2. [   ]  3. [   ]  4.  All of the patient's questions were answered.    No orders of the defined types were placed in this encounter.      This note was prepared using voice recognition software.  The details of this note are correct and have been reviewed, and corrected to the best of my ability.  Some grammatical areas may persist related to the Dragon software    Asaf Bellamy MD  Senior Attending Physician  [facility]  [facility] [place]    [phone]

## 2024-12-06 ENCOUNTER — TELEPHONE (OUTPATIENT)
Dept: PHYSICAL THERAPY | Facility: HOSPITAL | Age: 54
End: 2024-12-06
Payer: COMMERCIAL

## 2024-12-06 ENCOUNTER — TREATMENT (OUTPATIENT)
Dept: PHYSICAL THERAPY | Facility: HOSPITAL | Age: 54
End: 2024-12-06
Payer: COMMERCIAL

## 2024-12-06 DIAGNOSIS — M76.32 ILIOTIBIAL BAND TENDINITIS OF LEFT SIDE: Primary | ICD-10-CM

## 2024-12-06 DIAGNOSIS — M22.2X2 PATELLOFEMORAL PAIN SYNDROME OF LEFT KNEE: ICD-10-CM

## 2024-12-06 DIAGNOSIS — M75.122 COMPLETE TEAR OF LEFT ROTATOR CUFF, UNSPECIFIED WHETHER TRAUMATIC: Primary | ICD-10-CM

## 2024-12-06 PROCEDURE — 97110 THERAPEUTIC EXERCISES: CPT | Mod: GP,CQ

## 2024-12-06 ASSESSMENT — PAIN SCALES - GENERAL: PAINLEVEL_OUTOF10: 0 - NO PAIN

## 2024-12-06 ASSESSMENT — PAIN - FUNCTIONAL ASSESSMENT: PAIN_FUNCTIONAL_ASSESSMENT: 0-10

## 2024-12-06 NOTE — PROGRESS NOTES
"Physical Therapy Treatment    Patient Name: Allison Foley  MRN: [id number]  Today's Date: 12/6/2024     PT Therapeutic Procedures Time Entry  Therapeutic Exercise Time Entry: 38                   Current Problem  1. Iliotibial band tendinitis of left side        2. Patellofemoral pain syndrome of left knee            [facility] 30V     Visit #3      Subjective   Pt stating she is pain free the exercises have been helping.     Pain  Pain Assessment: 0-10  0-10 (Numeric) Pain Score: 0 - No pain  Pain Location: Back      Objective     Treatments:  Prone propping, 3 minutes *  Prone press ups, 10 reps x 3 *  Sciatic nerve floss, 30 reps *  SLR's, supine and prone, 10 reps x 2*  Bridges 2x10 3\"  S/L clamshells GTB 2x10 B  TA set with march 2x10  NuStep, L3, 10 minutes    Assessment:  Pt continues to respond to extension based exercises. Added bridges , supine march, and S/L clamshells to increase strength. Pt continued to deny pain after treatment.     Plan:  Progress exercises        "

## 2024-12-09 ENCOUNTER — TELEPHONE (OUTPATIENT)
Dept: PHYSICAL THERAPY | Facility: HOSPITAL | Age: 54
End: 2024-12-09
Payer: COMMERCIAL

## 2024-12-09 DIAGNOSIS — M75.122 COMPLETE TEAR OF LEFT ROTATOR CUFF, UNSPECIFIED WHETHER TRAUMATIC: Primary | ICD-10-CM

## 2024-12-09 NOTE — TELEPHONE ENCOUNTER
[facility] APPT CANC NOTICE FOR APPT SCHED 12/11/24. NO FURTHER ACTION RQRD PT IS SCHED W/PROVIDER 12/13/24

## 2024-12-10 ENCOUNTER — TREATMENT (OUTPATIENT)
Dept: PHYSICAL THERAPY | Facility: HOSPITAL | Age: 54
End: 2024-12-10
Payer: COMMERCIAL

## 2024-12-10 ENCOUNTER — TELEPHONE (OUTPATIENT)
Dept: PHYSICAL THERAPY | Facility: HOSPITAL | Age: 54
End: 2024-12-10
Payer: COMMERCIAL

## 2024-12-10 DIAGNOSIS — M75.122 COMPLETE TEAR OF LEFT ROTATOR CUFF, UNSPECIFIED WHETHER TRAUMATIC: Primary | ICD-10-CM

## 2024-12-10 DIAGNOSIS — M22.2X2 PATELLOFEMORAL SYNDROME OF LEFT KNEE: ICD-10-CM

## 2024-12-10 DIAGNOSIS — M76.32 ILIOTIBIAL BAND TENDONITIS OF LEFT SIDE: ICD-10-CM

## 2024-12-10 DIAGNOSIS — M76.32 ILIOTIBIAL BAND TENDINITIS OF LEFT SIDE: ICD-10-CM

## 2024-12-10 DIAGNOSIS — M22.2X2 PATELLOFEMORAL PAIN SYNDROME OF LEFT KNEE: ICD-10-CM

## 2024-12-10 DIAGNOSIS — G89.29 CHRONIC PAIN OF LEFT KNEE: ICD-10-CM

## 2024-12-10 DIAGNOSIS — M25.562 CHRONIC PAIN OF LEFT KNEE: ICD-10-CM

## 2024-12-10 PROCEDURE — 97110 THERAPEUTIC EXERCISES: CPT | Mod: GP | Performed by: PHYSICAL THERAPIST

## 2024-12-10 ASSESSMENT — PAIN - FUNCTIONAL ASSESSMENT: PAIN_FUNCTIONAL_ASSESSMENT: 0-10

## 2024-12-10 ASSESSMENT — PAIN SCALES - GENERAL: PAINLEVEL_OUTOF10: 0 - NO PAIN

## 2024-12-10 NOTE — PROGRESS NOTES
"Physical Therapy    Physical Therapy Treatment    Patient Name: Allison Foley  MRN: [id number]  Today's Date: 12/10/2024    Time Entry:   Time Calculation  Start Time: 0146  Stop Time: 0225  Time Calculation (min): 39 min     PT Therapeutic Procedures Time Entry  Therapeutic Exercise Time Entry: 38                   Assessment:  Pt walking into the clinic today with normal gait pattern.  Her Lt LE is feeling much better responding well to extension force to the lumbar spine.  No pain after treatment today.        Plan:  Continue with skilled PT      Current Problem  1. Complete tear of left rotator cuff, unspecified whether traumatic        2. Patellofemoral syndrome of left knee  Follow Up In Physical Therapy      3. Iliotibial band tendonitis of left side  Follow Up In Physical Therapy      4. Chronic pain of left knee  Follow Up In Physical Therapy      5. Patellofemoral pain syndrome of left knee  Follow Up In Physical Therapy      6. Iliotibial band tendinitis of left side  Follow Up In Physical Therapy          Subjective  Pt reports her Lt LE has been feeling much better.      Pain  Pain Assessment  Pain Assessment: 0-10  0-10 (Numeric) Pain Score: 0 - No pain    Objective     Lumbar and Lt hip/knee AROM WFL's and pain free.      Treatments:    Prone propping, 3 minutes *  Prone press ups, 10 reps x 3 *  Sciatic nerve floss, 30 reps *  SLR's, supine and prone, 10 reps x 2*  Bridges GTB, 2x10 3\"  S/L clamshells GTB 2x10 B  TA set with march.GTB, 30 reps   NuStep, L3, 10 minutes         "

## 2024-12-10 NOTE — LETTER
December 10, 2024     Patient: Allison Foley   YOB: 1970   Date of Visit: 12/10/2024       To Whom It May Concern:    Allison Foley was seen in my clinic on 12/10/2024 at 1:45pm.If you have any questions or concerns, please don't hesitate to call.         Sincerely,     Dandy Haywood, PT        CC: No Recipients

## 2024-12-11 ENCOUNTER — APPOINTMENT (OUTPATIENT)
Dept: PHYSICAL THERAPY | Facility: HOSPITAL | Age: 54
End: 2024-12-11
Payer: COMMERCIAL

## 2024-12-11 DIAGNOSIS — M75.122 COMPLETE TEAR OF LEFT ROTATOR CUFF, UNSPECIFIED WHETHER TRAUMATIC: Primary | ICD-10-CM

## 2024-12-13 ENCOUNTER — TREATMENT (OUTPATIENT)
Dept: PHYSICAL THERAPY | Facility: HOSPITAL | Age: 54
End: 2024-12-13
Payer: COMMERCIAL

## 2024-12-13 ENCOUNTER — TELEPHONE (OUTPATIENT)
Dept: PHYSICAL THERAPY | Facility: HOSPITAL | Age: 54
End: 2024-12-13

## 2024-12-13 DIAGNOSIS — M76.32 ILIOTIBIAL BAND TENDONITIS OF LEFT SIDE: ICD-10-CM

## 2024-12-13 DIAGNOSIS — Z98.890 S/P LEFT ROTATOR CUFF REPAIR: ICD-10-CM

## 2024-12-13 DIAGNOSIS — M22.2X2 PATELLOFEMORAL SYNDROME OF LEFT KNEE: ICD-10-CM

## 2024-12-13 DIAGNOSIS — M25.562 CHRONIC PAIN OF LEFT KNEE: ICD-10-CM

## 2024-12-13 DIAGNOSIS — M75.122 COMPLETE TEAR OF LEFT ROTATOR CUFF, UNSPECIFIED WHETHER TRAUMATIC: Primary | ICD-10-CM

## 2024-12-13 DIAGNOSIS — G89.29 CHRONIC PAIN OF LEFT KNEE: ICD-10-CM

## 2024-12-13 DIAGNOSIS — M76.32 ILIOTIBIAL BAND TENDINITIS OF LEFT SIDE: ICD-10-CM

## 2024-12-13 DIAGNOSIS — M22.2X2 PATELLOFEMORAL PAIN SYNDROME OF LEFT KNEE: ICD-10-CM

## 2024-12-13 PROCEDURE — 97110 THERAPEUTIC EXERCISES: CPT | Mod: GP,CQ

## 2024-12-13 ASSESSMENT — PAIN - FUNCTIONAL ASSESSMENT: PAIN_FUNCTIONAL_ASSESSMENT: 0-10

## 2024-12-13 ASSESSMENT — PAIN SCALES - GENERAL: PAINLEVEL_OUTOF10: 0 - NO PAIN

## 2024-12-13 NOTE — LETTER
December 13, 2024     Patient: Allison Foley   YOB: 1970   Date of Visit: 12/13/2024       To Whom It May Concern:    Allison Foley was seen in my clinic on 12/13/2024 at 1000 am. If you have any questions or concerns, please don't hesitate to call.         Sincerely,         Ellen Mckay, PTA        CC: No Recipients

## 2024-12-13 NOTE — PROGRESS NOTES
"Physical Therapy Treatment    Patient Name: Allison Foley  MRN: [id number]  Today's Date: 12/13/2024     PT Therapeutic Procedures Time Entry  Therapeutic Exercise Time Entry: 36                   Current Problem  1. Complete tear of left rotator cuff, unspecified whether traumatic        2. Patellofemoral syndrome of left knee        3. Iliotibial band tendonitis of left side        4. Chronic pain of left knee        5. Patellofemoral pain syndrome of left knee        6. Iliotibial band tendinitis of left side        7. S/P left rotator cuff repair            Insurance   [facility]  30V     Visit # 5      Subjective   Pt continues to deny pain.     Pain  Pain Assessment: 0-10  0-10 (Numeric) Pain Score: 0 - No pain      Objective     Treatments:  Prone propping, 3 minutes *  Prone press ups, 10 reps x 3 *  Sciatic nerve floss, 30 reps *  SLR's, supine and prone, 10 reps x 2*  Bridges GTB, 2x10 3\"  S/L clamshells GTB 3x10 B  TA set with march GTB, 30 reps   NuStep, L3, 10 minutes    Assessment:  Pt has been pain free for the last 3 visits since starting the extension exercises for her back. Continued with extension based exercises. Increased reps with hip strengthening noticing fatigue. Pt plans on returning to work she sees Dr. Bellamy. Educated pt to continue with her HEP even when she returns back to work.     Plan:  30 day HOLD       "

## 2024-12-18 ENCOUNTER — APPOINTMENT (OUTPATIENT)
Dept: PHYSICAL THERAPY | Facility: HOSPITAL | Age: 54
End: 2024-12-18
Payer: COMMERCIAL

## 2024-12-18 DIAGNOSIS — M75.122 COMPLETE TEAR OF LEFT ROTATOR CUFF, UNSPECIFIED WHETHER TRAUMATIC: Primary | ICD-10-CM

## 2024-12-20 ENCOUNTER — APPOINTMENT (OUTPATIENT)
Dept: PHYSICAL THERAPY | Facility: HOSPITAL | Age: 54
End: 2024-12-20
Payer: COMMERCIAL

## 2024-12-20 DIAGNOSIS — M75.122 COMPLETE TEAR OF LEFT ROTATOR CUFF, UNSPECIFIED WHETHER TRAUMATIC: Primary | ICD-10-CM

## 2025-01-07 ENCOUNTER — OFFICE VISIT (OUTPATIENT)
Dept: ORTHOPEDIC SURGERY | Facility: CLINIC | Age: 55
End: 2025-01-07
Payer: COMMERCIAL

## 2025-01-07 DIAGNOSIS — M22.2X2 PATELLOFEMORAL SYNDROME OF LEFT KNEE: Primary | ICD-10-CM

## 2025-01-07 PROCEDURE — 99213 OFFICE O/P EST LOW 20 MIN: CPT | Performed by: ORTHOPAEDIC SURGERY

## 2025-01-07 PROCEDURE — 1036F TOBACCO NON-USER: CPT | Performed by: ORTHOPAEDIC SURGERY

## 2025-01-07 NOTE — LETTER
[facility]  [address] [place] [id number]  PHONE: [phone]  FAX: [phone]      January 7, 2025    Patient: Allison Foley   YOB: 1970   Date of Visit: 1/7/2025       To Whom It May Concern,    Allison Foley was seen in my clinic on 1/7/2025.    If you have any questions or concerns, please contact the office by phone or fax.  Phone: [phone] or Fax: [phone]        Sincerely,      Asaf Bellamy MD

## 2025-01-07 NOTE — LETTER
[facility]  [address] [place] [id number]  PHONE: [phone]  FAX: [phone]      January 7, 2025    Patient: Allison Foley   YOB: 1970   Date of Visit: 1/7/2025       To Whom It May Concern,    Allison Foley was seen in my clinic on 1/7/2025, she has been advised she may return to work on 1/13/2025, with no restrictions.    If you have any questions or concerns, please contact the office by phone or fax.  Phone: [phone] or Fax: [phone]        Sincerely,      Asaf Bellamy MD

## 2025-01-07 NOTE — PROGRESS NOTES
Subjective    Patient ID: Allison    Chief Complaint:   Chief Complaint   Patient presents with    Left Knee - Follow-up     PT mentioned last note     History of present illness    54-year-old female presented clinic today for follow-up evaluation left knee sprain.  She is doing much better at this time after physical therapy.  She states she has 2 visits left but she does not feel the need to go.  She is ready to return to work at this time.  She is very pleased with the outcome of her treatment plan.  Having minimal discomfort      Past medical , Surgical, Family and social history reviewed.      Physical exam  General: No acute distress and breathing comfortably.  Patient is pleasant and cooperative with the examination.    Extremity  Left knee is neurovascular intact.  Good range of motion 0 to 125 degrees.  No edema ecchymosis erythema.  No tenderness palpation.  Compartment soft.  Capillary refill within wrist distally.  2+ pulses bilateral lower extremity.  No sign of DVT.  Good strength.    Diagnostics  [ none]    Procedure  [ none]    Assessment  Left knee sprain, resolving    Treatment plan  1.  At this time she will continue with weightbearing activity as tolerated  2..  She was given a note stating that she was here today.  She also got a note today stating that she can return to work full duty no restrictions  dated 1/13/2025  3.  She will follow-up with us as needed.  For complete plan and/or surgical details, please refer to Dr. Bellamy's portion of the split/shared dictation.  4.  All of the patient's questions were answered.    No orders of the defined types were placed in this encounter.      This note was prepared using voice recognition software.  The details of this note are correct and have been reviewed, and corrected to the best of my ability.  Some grammatical areas may persist related to the Dragon software    Julius Medley PA-C, [facility]  [facility]  Orthopedic [place]    [phone]    In a face-to-face encounter performed today, I Asaf Bellamy MD performed a history and physical examination, discussed pertinent diagnostic studies if indicated, and discussed diagnosis and management strategies with both the patient and the midlevel provider.  I reviewed the midlevel's note and agree with the documented findings and plan of care.  Greater than 50% of the evaluation and treatment decision was performed by the physician myself during today's visit.    54-year-old female here for follow-up of her knee pain states he is feeling much better her left knee no numbness or tingling no fevers or chills she is ambulating without assistive device.  On examination good range of motion 0-1 25 no instability brisk cap refill compartments are soft calf is nontender.  Impression is resolved knee pain.  Plan is continue with her exercise program return to work follow-up if the knee gives her more trouble otherwise as needed.      Patient has severe impairment related to her presenting condition.  It is significantly impairing bodily function.  We did discuss surgical and nonsurgical options.    This note was prepared using voice recognition software.  The details of this note are correct and have been reviewed, and corrected to the best of my ability.  Some grammatical areas may persist related to the Dragon software    Asaf Bellamy MD  Senior Attending Physician  [facility]    [phone]

## 2025-03-28 ENCOUNTER — OFFICE VISIT (OUTPATIENT)
Dept: ORTHOPEDIC SURGERY | Facility: CLINIC | Age: 55
End: 2025-03-28
Payer: COMMERCIAL

## 2025-03-28 DIAGNOSIS — M25.562 PAIN IN BOTH KNEES, UNSPECIFIED CHRONICITY: ICD-10-CM

## 2025-03-28 DIAGNOSIS — S80.00XA CONTUSION OF KNEE, INITIAL ENCOUNTER: Primary | ICD-10-CM

## 2025-03-28 DIAGNOSIS — M25.561 PAIN IN BOTH KNEES, UNSPECIFIED CHRONICITY: ICD-10-CM

## 2025-03-28 PROCEDURE — 99213 OFFICE O/P EST LOW 20 MIN: CPT | Performed by: PHYSICIAN ASSISTANT

## 2025-03-28 NOTE — PROGRESS NOTES
Subjective    Patient ID: Allison    Chief Complaint:   Chief Complaint   Patient presents with    Left Knee - Pain     Injury on 3/23/2025  Xrays completed at [facility]    Right Knee - Pain     Injury on 3/23/2025  Xrays completed at [facility]     History of present illness    54-year-old female presented clinic today for evaluation bilateral knee pain.  She states that on Sunday last week she tripped and fell hitting her left knee on a tree stump and her right knee on the ground.  She has both knees replaced.  She went to [facility] where x-rays were taken and were negative for fracture or bony abnormalities.  Was diagnosed with contusions of both knees.  She has just been taken over-the-counter anti-inflammatory medication such as Aleve which does seem to help.  She has noticed no instability no locking catching.  She states that by Tuesday her pain was significantly improved.  She is seeing no limitation with range of motion.      Past medical , Surgical, Family and social history reviewed.      Physical exam  General: No acute distress and breathing comfortably.  Patient is pleasant and cooperative with the examination.    Extremity  Left knee is neurovascular intact.  Minimal tenderness.  Very good range of motion 0 to 120 degrees.  No edema ecchymosis or erythema seen.  No instability.  Old incision well-healed well-approximated without abnormality or infection.    Right knee is neurovascular intact.  She has mild tenderness palpation over the medial collateral ligament where there is a small quarter size contusion over the distal aspect no instability.  Compartment soft.  Capillary refill is within normal limits distally.    Diagnostics  [ none]  No results found.     Procedure  [ none]    Assessment  Bilateral knee contusion    Treatment plan  1.  At this time she was encouraged to continue with weightbearing activity as tolerated.  2.  She will continue with over-the-counter anti-inflammatory medications as  needed as well as her ice machine that she has been using on both knees.  3.  She will follow-up with us if she needs anything in the future.  4.  All of the patient's questions were answered.    Orders Placed This Encounter    XR knee 4+ views bilateral       This note was prepared using voice recognition software.  The details of this note are correct and have been reviewed, and corrected to the best of my ability.  Some grammatical areas may persist related to the Dragon software    Julius Medley PA-C, [facility]  [facility]  Orthopedic [place]    [phone]

## 2025-03-31 ENCOUNTER — TELEPHONE (OUTPATIENT)
Dept: PHYSICAL THERAPY | Facility: HOSPITAL | Age: 55
End: 2025-03-31
Payer: COMMERCIAL

## 2025-03-31 DIAGNOSIS — M75.122 COMPLETE TEAR OF LEFT ROTATOR CUFF, UNSPECIFIED WHETHER TRAUMATIC: Primary | ICD-10-CM

## 2025-03-31 NOTE — LETTER
March 31, 2025     Patient: Allison Foley   YOB: 1970   Date of Visit: 3/31/2025       To Whom It May Concern:    Allison Foley was seen in my clinic on 12/10/24 at 1:45 PM. Please except this letter as verification of her appointment.    If you have any questions or concerns, please don't hesitate to call.         Sincerely,         Dandy Haywood, PT        CC: No Recipients

## 2025-03-31 NOTE — TELEPHONE ENCOUNTER
PC FROM PT CALLING TO REQUEST FOR LETTER VERF TO BE FAXED TO [facility] OF APPOINTMENT VERF FOR APPT COMPLETED 12/10/24 TO FAX# [phone] FOR [employer]. FAX LETTER AND SPOKE TO PT DIRECT TO ADV REQUEST COMPLETED.

## 2025-04-19 ENCOUNTER — HOSPITAL ENCOUNTER (EMERGENCY)
Age: 55
Discharge: HOME | End: 2025-04-19
Payer: COMMERCIAL

## 2025-04-19 VITALS
HEART RATE: 80 BPM | DIASTOLIC BLOOD PRESSURE: 81 MMHG | TEMPERATURE: 98.1 F | WEIGHT: 248.5 LBS | HEIGHT: 64 IN | SYSTOLIC BLOOD PRESSURE: 114 MMHG | OXYGEN SATURATION: 96 % | RESPIRATION RATE: 18 BRPM | BODY MASS INDEX: 42.43 KG/M2

## 2025-04-19 DIAGNOSIS — H10.31 ACUTE BACTERIAL CONJUNCTIVITIS OF RIGHT EYE: Primary | ICD-10-CM

## 2025-04-19 PROCEDURE — 99283 EMERGENCY DEPT VISIT LOW MDM: CPT

## 2025-04-19 RX ORDER — ERYTHROMYCIN 5 MG/G
1 OINTMENT OPHTHALMIC 2 TIMES DAILY
Qty: 3.5 G | Refills: 0 | Status: SHIPPED | OUTPATIENT
Start: 2025-04-19 | End: 2025-04-26

## 2025-04-19 ASSESSMENT — PAIN - FUNCTIONAL ASSESSMENT: PAIN_FUNCTIONAL_ASSESSMENT: 0-10

## 2025-04-19 ASSESSMENT — PAIN SCALES - GENERAL: PAINLEVEL_OUTOF10: 8

## 2025-04-19 ASSESSMENT — PAIN DESCRIPTION - FREQUENCY: FREQUENCY: CONTINUOUS

## 2025-04-19 ASSESSMENT — PAIN DESCRIPTION - ORIENTATION: ORIENTATION: RIGHT

## 2025-04-19 ASSESSMENT — PAIN DESCRIPTION - PAIN TYPE: TYPE: ACUTE PAIN

## 2025-04-19 ASSESSMENT — PAIN DESCRIPTION - LOCATION: LOCATION: EYE

## 2025-04-19 ASSESSMENT — PAIN DESCRIPTION - DESCRIPTORS: DESCRIPTORS: ITCHING

## 2025-04-19 NOTE — ED PROVIDER NOTES
Inhale 2 puffs into the lungs 2 times dailyHistorical Med      EQ LUBRICANT EYE DROPS 0.4-0.3 % ophthalmic solution Apply 2 drops to eye as needed, DAWHistorical Med      ranitidine (ZANTAC) 150 MG tablet Take 150 mg by mouth as neededHistorical Med      acetaminophen (TYLENOL) 325 MG tablet Take 2 tablets by mouth every 6 hours, Disp-120 tablet, R-0Print      UNKNOWN TO PATIENT Take 1 tablet by mouth nightly Bipolar medicineHistorical Med      ipratropium-albuterol (DUONEB) 0.5-2.5 (3) MG/3ML SOLN nebulizer solution Take 3 mLs by nebulization every 4 hoursHistorical Med      levothyroxine (SYNTHROID) 300 MCG tablet Take 250 mcg by mouth DailyHistorical Med             ALLERGIES       Latex, Bactrim [sulfamethoxazole-trimethoprim], Sulfa antibiotics, and Tape [adhesive tape]    FAMILY HISTORY       Family History   Problem Relation Age of Onset    Cancer Mother     Diabetes Mother     Cancer Father     Diabetes Father     Diabetes Sister     Thyroid Disease Sister     Arthritis Sister     Asthma Son         SOCIAL HISTORY       Social History     Tobacco Use    Smoking status: Former     Current packs/day: 0.00     Types: Cigarettes     Start date: 1995     Quit date: 2010     Years since quittin.3    Smokeless tobacco: Never   Vaping Use    Vaping status: Never Used   Substance Use Topics    Alcohol use: No    Drug use: Not Currently     Types: Marijuana (Weed)     Comment: medical        PHYSICAL EXAM       ED Triage Vitals   BP Systolic BP Percentile Diastolic BP Percentile Temp Temp Source Pulse Respirations SpO2   25 1103 -- -- 25 1101 25 1101 25 1103 25 1103 25 1103   114/81   98.1 °F (36.7 °C) Oral 80 18 96 %      Height Weight - Scale         25 1101 25 1101         1.626 m (5' 4\") 112.7 kg (248 lb 8 oz)             Physical Exam  Vitals and nursing note reviewed.   Constitutional:       General: She is not in acute distress.     Appearance: She  5 MG/GM ophthalmic ointment Place 1 cm into the right eye in the morning and at bedtime for 7 days, Right Eye, 2 times daily Starting Sat 4/19/2025, Until Sat 4/26/2025, For 7 days, Disp-3.5 g, R-0, Normal             (Please note that portions of this note were completed with a voice recognition program.  Efforts were made to edit the dictations but occasionally words are mis-transcribed.)    Sharif Green MD (electronically signed)  Attending Emergency Physician          Sharif Green MD  Resident  04/19/25 [id number]

## 2025-07-23 ENCOUNTER — HOSPITAL ENCOUNTER (EMERGENCY)
Age: 55
Discharge: HOME | End: 2025-07-23
Attending: EMERGENCY MEDICINE
Payer: OTHER MISCELLANEOUS

## 2025-07-23 VITALS
SYSTOLIC BLOOD PRESSURE: 126 MMHG | TEMPERATURE: 98.8 F | WEIGHT: 250 LBS | RESPIRATION RATE: 20 BRPM | HEART RATE: 88 BPM | BODY MASS INDEX: 42.91 KG/M2 | DIASTOLIC BLOOD PRESSURE: 86 MMHG | OXYGEN SATURATION: 98 %
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
## 2025-07-23 PROCEDURE — 99283 EMERGENCY DEPT VISIT LOW MDM: CPT

## 2025-07-23 ASSESSMENT — PAIN - FUNCTIONAL ASSESSMENT
PAIN_FUNCTIONAL_ASSESSMENT: NONE - DENIES PAIN
PAIN_FUNCTIONAL_ASSESSMENT: NONE - DENIES PAIN
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
## 2025-07-23 NOTE — DISCHARGE INSTRUCTIONS
Continue Tylenol and Motrin as needed for any muscle soreness.  Please call your primary care doctor to schedule follow-up visit for repeat examination.  Return to the ER for any worsening symptoms or concerns

## (undated) DEVICE — WAND ABLAT DIA3.5MM PRB ENERGY SUCT 50-S SERFAS

## (undated) DEVICE — 4.5 MM CANNULA AND OBTURATOR,                                    CONICAL TIP, DISTAL HOLES

## (undated) DEVICE — GLOVE ORANGE PI 7 1/2   MSG9075

## (undated) DEVICE — GLOVE ORANGE PI 8   MSG9080

## (undated) DEVICE — GLOVE, SURGICAL, PROTEXIS PI BLUE W/NEUTHERA, 7.5, PF, LF

## (undated) DEVICE — SUTURE VCRL SZ 1 L36IN ABSRB UD L36MM CT-1 1/2 CIR J947H

## (undated) DEVICE — GAUZE SPONGES,12 PLY: Brand: CURITY

## (undated) DEVICE — GLOVE, SURGICAL, PROTEXIS PI , 8.5, PF, LF

## (undated) DEVICE — STERILE PATIENT PROTECTIVE PAD FOR IMP® KNEE POSITIONERS & COHESIVE WRAP (10 / CASE): Brand: DE MAYO KNEE POSITIONER®

## (undated) DEVICE — SKIN MARKER,REGULAR TIP WITH RULER: Brand: DEVON

## (undated) DEVICE — BLADE SAW W125XL70MM THK064MM CUT THK094MM REPL RECIP DBL

## (undated) DEVICE — HIGH FLOW TIP

## (undated) DEVICE — UNDERCAST PADDING: Brand: DEROYAL

## (undated) DEVICE — ELASTIC BANDAGE: Brand: DEROYAL

## (undated) DEVICE — ULTRAMIX BOWL (NEW) KNEE

## (undated) DEVICE — BLADE RMR L29MM PAT W/ PILOT HL

## (undated) DEVICE — SYRINGE MED 30ML STD CLR PLAS LUERLOCK TIP N CTRL DISP

## (undated) DEVICE — Device: Brand: STABLECUT®

## (undated) DEVICE — PAD,ABDOMINAL,8"X10",ST,LF: Brand: MEDLINE

## (undated) DEVICE — TUBING PMP L8FT LNG W/ CONN FOR AR-6400 REDEUCE

## (undated) DEVICE — TUBING, SUCTION, 6MM X 10, CLEAN N-COND

## (undated) DEVICE — ZIMMER® STERILE DISPOSABLE TOURNIQUET CUFF, DUAL PORT, SINGLE BLADDER, 34 IN. (86 CM)

## (undated) DEVICE — ELECTRODE PT RET AD L9FT HI MOIST COND ADH HYDRGEL CORDED

## (undated) DEVICE — TUBING SUCT L20FT DIA025IN W FEM MOLD CONN NONCONDUCTIVE

## (undated) DEVICE — CHLORAPREP 26ML ORANGE

## (undated) DEVICE — 1842 FOAM BLOCK NEEDLE COUNTER: Brand: DEVON

## (undated) DEVICE — MAT, FLOOR, STANDARD FLUID BARRIER, 32X44, GREEN

## (undated) DEVICE — 3M™ COBAN™ STERILE SELF-ADHERENT WRAP, 1584S, 4 IN X 5 YD (10 CM X 4,5 M), 18 ROLLS/CASE: Brand: 3M™ COBAN™

## (undated) DEVICE — SUTURE MCRYL SZ 4-0 L27IN ABSRB UD L19MM PS-2 1/2 CIR PRIM Y426H

## (undated) DEVICE — NEEDLE HYPO 18GA L1.5IN THN WALL PIVOTING SHLD BVL ORIENTED

## (undated) DEVICE — PROBE, CRUISE ENERGY, ARTHOSCOPIC SERFAS 90-S 4.0MM

## (undated) DEVICE — LABEL MED MINI W/ MARKER

## (undated) DEVICE — Device

## (undated) DEVICE — SUTURE, ETHILON, 3-0, 18 IN, PS1, BLACK

## (undated) DEVICE — INTENDED FOR TISSUE SEPARATION, AND OTHER PROCEDURES THAT REQUIRE A SHARP SURGICAL BLADE TO PUNCTURE OR CUT.: Brand: BARD-PARKER ® CARBON RIB-BACK BLADES

## (undated) DEVICE — SONY PRINTER PAPER

## (undated) DEVICE — Z DISCONTINUED PER MEDLINE USE 2741944 DRESSING AQUACEL 12 IN SURG W9XL30CM SIL CVR WTRPRF VIR BACT BARR ANTIMIC

## (undated) DEVICE — MANIFOLD, 4 PORT NEPTUNE STANDARD

## (undated) DEVICE — MEDI-VAC TUBING CONNECT "T" POLYPROPYLENE: Brand: CARDINAL HEALTH

## (undated) DEVICE — SUTURE MCRYL + SZ 3-0 L36IN ABSRB UD CT-1 L36MM 1/2 CIR MCP944H

## (undated) DEVICE — GOWN,AURORA,NONREINFORCED,LARGE: Brand: MEDLINE

## (undated) DEVICE — SYRINGE MED 50ML LUERLOCK TIP

## (undated) DEVICE — SIPS DUAL 2 MINUTE TIP

## (undated) DEVICE — PACK ARTHRO III ST SIRUS

## (undated) DEVICE — DRESSING PETRO W3XL8IN N ADH KNIT CELOS ACETT ADPTC

## (undated) DEVICE — TIBURON TOP SHEET: Brand: CONVERTORS

## (undated) DEVICE — SPLINT KNEE UNIV FOR LESS THAN 36IN L20IN FOAM LAM E CNTCT

## (undated) DEVICE — LABEL MED MED CHPOR

## (undated) DEVICE — 3000CC GUARDIAN II: Brand: GUARDIAN

## (undated) DEVICE — TRAY CATH CATH OD16FR BG F HYDROCOATED

## (undated) DEVICE — TUBE IRRIG L8IN LNG PT W/ CONN FOR PMP SYS REDEUCE

## (undated) DEVICE — GLOVE, SURGICAL, PROTEXIS PI , 7.5, PF, LF

## (undated) DEVICE — PROTECTOR, NERVE, ULNAR, PINK

## (undated) DEVICE — TIBURON SPLIT SHEET: Brand: CONVERTORS

## (undated) DEVICE — SUTURE ETHLN SZ 4-0 L18IN NONABSORBABLE BLK L19MM PS-2 3/8 1667H

## (undated) DEVICE — [TOMCAT CUTTER, ARTHROSCOPIC SHAVER BLADE,  DO NOT RESTERILIZE,  DO NOT USE IF PACKAGE IS DAMAGED,  KEEP DRY,  KEEP AWAY FROM SUNLIGHT]: Brand: FORMULA

## (undated) DEVICE — 3M™ STERI-DRAPE™ U-DRAPE 1015: Brand: STERI-DRAPE™

## (undated) DEVICE — PACK PROCEDURE SURG TOTAL KNEE PACK

## (undated) DEVICE — MAT FLR SURG QUICKWICK 28X54 IN DISP

## (undated) DEVICE — USAGE FEE F/OSTEOTOME

## (undated) DEVICE — NEEDLE SPNL L3 1/2IN OD18GA DMND PT PLAS GRN HUB SENSTCH

## (undated) DEVICE — CONVERTED USE 291618 SPONGE LAPAROTOMY POCKET POUCH RING 18

## (undated) DEVICE — SUTURE, FIBERLINK P 2, 26IN BRAIDED POLYBLEND, BLUE

## (undated) DEVICE — GOWN, SURGICAL, ROYAL SILK, XXL, STERILE

## (undated) DEVICE — STRAP, VELCRO, BODY, 4 X 60IN, NS

## (undated) DEVICE — BLADE, STRYKER, 5.5MM RESECTOR, F-SERIES

## (undated) DEVICE — APPLICATOR, CHLORAPREP, W/ORANGE TINT, 26ML

## (undated) DEVICE — HYPODERMIC SAFETY NEEDLE: Brand: MAGELLAN

## (undated) DEVICE — SMARTGOWN BREATHABLE SPECIALTY GOWN: Brand: CONVERTORS

## (undated) DEVICE — POSITIONER, CRADLE, HEAD, MEDC, FOAM SLOTTED

## (undated) DEVICE — SYRINGE, 30 CC, LUER LOCK

## (undated) DEVICE — DRAPE, SHEET, U, W/ADHESIVE STRIP, IMPERVIOUS, 60 X 70 IN, DISPOSABLE, LF, STERILE

## (undated) DEVICE — COVER, MAYO STAND, W/PAD, 23 IN, DISPOSABLE, PLASTIC, LF, STERILE

## (undated) DEVICE — CANNULA, ARTHROSCOPIC TWIST-IN 7MM

## (undated) DEVICE — NEEDLE, SCORPION, HD

## (undated) DEVICE — T4 HOOD

## (undated) DEVICE — HOLSTER, ELECTROSURGERY ACCESSORY, STERILE

## (undated) DEVICE — TUBING, PATIENT 8FT STERILE

## (undated) DEVICE — STRAP, ARM BOARD, 32 X 1.5

## (undated) DEVICE — CANNULA, GEMINI SR8

## (undated) DEVICE — TUBING, PUMP REDEUCE 8FT STERILE

## (undated) DEVICE — BURR, STRYKER, 4.0MM, BRL 6FLT

## (undated) DEVICE — IMMOBILIZER KNEE UNIV L19IN FOR 12-24IN THGH FOAM T BAR